# Patient Record
Sex: MALE | Race: ASIAN | NOT HISPANIC OR LATINO | Employment: OTHER
[De-identification: names, ages, dates, MRNs, and addresses within clinical notes are randomized per-mention and may not be internally consistent; named-entity substitution may affect disease eponyms.]

---

## 2017-04-22 ENCOUNTER — HOSPITAL (OUTPATIENT)
Dept: OTHER | Age: 67
End: 2017-04-22
Attending: FAMILY MEDICINE

## 2017-04-22 LAB
A/G RATIO_: 1.5
ABS LYMPH: 2.4 K/CUMM (ref 1–3.5)
ABS MONO: 0.5 K/CUMM (ref 0.1–0.8)
ABS NEUTRO: 3.6 K/CUMM (ref 2–8)
ALBUMIN: 4.1 G/DL (ref 3.5–5)
ALK PHOS: 79 UNIT/L (ref 50–124)
ALT/GPT: 23 UNIT/L (ref 0–55)
ANION GAP SERPL CALC-SCNC: 15 MEQ/L (ref 10–20)
AST/GOT: 23 UNIT/L (ref 5–34)
BASOPHIL: 1 % (ref 0–1)
BILI TOTAL: 0.6 MG/DL (ref 0.2–1)
BUN SERPL-MCNC: 13 MG/DL (ref 6–20)
CALCIUM: 9.7 MG/DL (ref 8.4–10.2)
CHLORIDE: 104 MEQ/L (ref 97–107)
CHOLESTEROL: 141 MG/DL
CREATININE: 0.93 MG/DL (ref 0.6–1.3)
DIFF_TYPE?: ABNORMAL
EOSINOPHIL: 4 % (ref 0–6)
GLOBULIN_: 2.8 G/DL (ref 2–4.1)
GLUCOSE LVL: 142 MG/DL (ref 70–99)
HCT VFR BLD CALC: 40 % (ref 36–51)
HDLC SERPL-MCNC: 40 MG/DL (ref 40–60)
HEMOLYSIS 2+: NEGATIVE
HEMOLYSIS 4+: NEGATIVE
HGB A1C: 6.8 %
HGB BLD-MCNC: 12.5 G/DL (ref 12–17)
IMMATURE GRAN: 0.3 % (ref 0–0.3)
INSTR WBC: 6.8 K/CUMM (ref 4–11)
LDLC SERPL CALC-MCNC: 90 MG/DL
LIPEMIC 3+: NEGATIVE
LYMPHOCYTE: 35 %
MCH RBC QN AUTO: 28 PG (ref 25–35)
MCHC RBC AUTO-ENTMCNC: 31 G/DL (ref 32–37)
MCV RBC AUTO: 89 FL (ref 78–97)
MICROALB/CREAT RATIO: <8 UG/MG
MONOCYTE: 7 %
NEUTROPHIL: 53 %
NRBC BLD MANUAL-RTO: 0 % (ref 0–0.2)
PLATELET: 302 K/CUMM (ref 150–450)
POTASSIUM: 4.5 MEQ/L (ref 3.5–5.1)
PSA LEVEL: 0.54 NG/ML (ref 0–4)
RBC # BLD: 4.49 M/CUMM (ref 4.2–6)
RDW: 12.9 % (ref 11.5–14.5)
SODIUM: 139 MEQ/L (ref 136–145)
TCO2: 25 MEQ/L (ref 19–29)
TOTAL PROTEIN: 6.9 G/DL (ref 6.4–8.3)
TRIGL SERPL-MCNC: 57 MG/DL
U CREATININE: 61.37 MG/DL
U MICROALBUMIN: <5 MG/L
WBC # BLD: 6.8 K/CUMM (ref 4–11)

## 2017-09-26 ENCOUNTER — HOSPITAL (OUTPATIENT)
Dept: OTHER | Age: 67
End: 2017-09-26
Attending: FAMILY MEDICINE

## 2017-09-26 LAB
HGB A1C: 7 %
MICROALB/CREAT RATIO: NORMAL UG/MG
U CREATININE: 74.36 MG/DL
U MICROALBUMIN: <5 MG/L

## 2017-12-08 ENCOUNTER — CHARTING TRANS (OUTPATIENT)
Dept: OTHER | Age: 67
End: 2017-12-08

## 2017-12-11 ENCOUNTER — HOSPITAL (OUTPATIENT)
Dept: OTHER | Age: 67
End: 2017-12-11
Attending: FAMILY MEDICINE

## 2017-12-11 LAB
A/G RATIO_: 1.2
ABS LYMPH: 2.7 K/CUMM (ref 1–3.5)
ABS MONO: 0.5 K/CUMM (ref 0.1–0.8)
ABS NEUTRO: 3.2 K/CUMM (ref 2–8)
ALBUMIN: 3.9 G/DL (ref 3.5–5)
ALK PHOS: 94 UNIT/L (ref 50–124)
ALT/GPT: 27 UNIT/L (ref 0–55)
ANION GAP SERPL CALC-SCNC: 11 MEQ/L (ref 10–20)
AST/GOT: 24 UNIT/L (ref 5–34)
BASOPHIL: 1 % (ref 0–1)
BILI TOTAL: 0.4 MG/DL (ref 0.2–1)
BUN SERPL-MCNC: 8 MG/DL (ref 6–20)
CALCIUM: 9.4 MG/DL (ref 8.4–10.2)
CHLORIDE: 104 MEQ/L (ref 97–107)
CHOLESTEROL: 136 MG/DL
CREATININE: 0.95 MG/DL (ref 0.6–1.3)
DIFF_TYPE?: NORMAL
EOSINOPHIL: 4 % (ref 0–6)
GLOBULIN_: 3.2 G/DL (ref 2–4.1)
GLUCOSE LVL: 140 MG/DL (ref 70–99)
HCT VFR BLD CALC: 40 % (ref 36–51)
HDLC SERPL-MCNC: 45 MG/DL (ref 40–60)
HEMOLYSIS 2+: NEGATIVE
HGB A1C: 7.4 %
HGB BLD-MCNC: 12.5 G/DL (ref 12–17)
IMMATURE GRAN: 0.4 % (ref 0–0.3)
INSTR WBC: 6.8 K/CUMM (ref 4–11)
LDLC SERPL CALC-MCNC: 75 MG/DL
LIPEMIC 3+: NEGATIVE
LYMPHOCYTE: 40 %
MCH RBC QN AUTO: 28 PG (ref 25–35)
MCHC RBC AUTO-ENTMCNC: 32 G/DL (ref 32–37)
MCV RBC AUTO: 90 FL (ref 78–97)
MICROALB/CREAT RATIO: NORMAL UG/MG
MONOCYTE: 8 %
NEUTROPHIL: 47 %
NRBC BLD MANUAL-RTO: 0 % (ref 0–0.2)
PLATELET: 293 K/CUMM (ref 150–450)
POTASSIUM: 4.6 MEQ/L (ref 3.5–5.1)
RBC # BLD: 4.4 M/CUMM (ref 4.2–6)
RDW: 12.3 % (ref 11.5–14.5)
SODIUM: 139 MEQ/L (ref 136–145)
TCO2: 29 MEQ/L (ref 19–29)
TOTAL PROTEIN: 7.1 G/DL (ref 6.4–8.3)
TRIGL SERPL-MCNC: 80 MG/DL
U CREATININE: 68.61 MG/DL
U MICROALBUMIN: <5 MG/L
WBC # BLD: 6.8 K/CUMM (ref 4–11)

## 2018-03-19 ENCOUNTER — OFFICE VISIT (OUTPATIENT)
Dept: FAMILY MEDICINE CLINIC | Facility: CLINIC | Age: 68
End: 2018-03-19

## 2018-03-19 VITALS
HEART RATE: 92 BPM | DIASTOLIC BLOOD PRESSURE: 78 MMHG | TEMPERATURE: 98 F | BODY MASS INDEX: 23.43 KG/M2 | SYSTOLIC BLOOD PRESSURE: 132 MMHG | WEIGHT: 137.25 LBS | HEIGHT: 64 IN

## 2018-03-19 DIAGNOSIS — E78.00 HYPERCHOLESTEREMIA: ICD-10-CM

## 2018-03-19 DIAGNOSIS — IMO0001 UNCONTROLLED TYPE 2 DIABETES MELLITUS WITHOUT COMPLICATION, WITH LONG-TERM CURRENT USE OF INSULIN: Primary | ICD-10-CM

## 2018-03-19 DIAGNOSIS — R03.0 ELEVATED BLOOD PRESSURE READING: ICD-10-CM

## 2018-03-19 PROCEDURE — 99212 OFFICE O/P EST SF 10 MIN: CPT | Performed by: FAMILY MEDICINE

## 2018-03-19 PROCEDURE — 99203 OFFICE O/P NEW LOW 30 MIN: CPT | Performed by: FAMILY MEDICINE

## 2018-03-19 RX ORDER — NAPROXEN 500 MG/1
500 TABLET ORAL 2 TIMES DAILY WITH MEALS
COMMUNITY
End: 2019-05-21 | Stop reason: ALTCHOICE

## 2018-03-19 RX ORDER — LISINOPRIL 5 MG/1
5 TABLET ORAL DAILY
Qty: 30 TABLET | Refills: 0 | Status: SHIPPED | OUTPATIENT
Start: 2018-03-19 | End: 2018-04-17

## 2018-03-19 RX ORDER — IBUPROFEN 600 MG/1
600 TABLET ORAL EVERY 6 HOURS PRN
COMMUNITY
End: 2018-04-19

## 2018-03-19 RX ORDER — ATORVASTATIN CALCIUM 20 MG/1
20 TABLET, FILM COATED ORAL NIGHTLY
COMMUNITY
End: 2018-04-19

## 2018-03-19 NOTE — PROGRESS NOTES
HPI:    Patient ID: Annette Correa is a 74year old male. HPI     Patient presents with:  Establish Care: Here today to establish care with new PCP;  Last PCP was Dr. Aneesh Welch at [facility].     Patient has hx Diabetes mellitus and cholesterol  Sanjana well-nourished. Neck: Normal range of motion. Neck supple. No thyromegaly present. Cardiovascular: Normal rate and regular rhythm. Pulmonary/Chest: Effort normal and breath sounds normal. He has no wheezes. Abdominal: Soft.  Bowel sounds are normal

## 2018-04-02 PROBLEM — E11.9 DIABETES MELLITUS TYPE 2 WITHOUT RETINOPATHY (HCC): Status: ACTIVE | Noted: 2018-04-02

## 2018-04-02 PROBLEM — H25.13 NUCLEAR SCLEROTIC CATARACT OF BOTH EYES: Status: ACTIVE | Noted: 2018-04-02

## 2018-04-03 ENCOUNTER — APPOINTMENT (OUTPATIENT)
Dept: LAB | Age: 68
End: 2018-04-03
Attending: FAMILY MEDICINE
Payer: MEDICAID

## 2018-04-03 DIAGNOSIS — IMO0001 UNCONTROLLED TYPE 2 DIABETES MELLITUS WITHOUT COMPLICATION, WITH LONG-TERM CURRENT USE OF INSULIN: ICD-10-CM

## 2018-04-03 DIAGNOSIS — E78.00 HYPERCHOLESTEREMIA: ICD-10-CM

## 2018-04-03 PROCEDURE — 82570 ASSAY OF URINE CREATININE: CPT

## 2018-04-03 PROCEDURE — 80053 COMPREHEN METABOLIC PANEL: CPT

## 2018-04-03 PROCEDURE — 36415 COLL VENOUS BLD VENIPUNCTURE: CPT

## 2018-04-03 PROCEDURE — 82043 UR ALBUMIN QUANTITATIVE: CPT

## 2018-04-03 PROCEDURE — 83036 HEMOGLOBIN GLYCOSYLATED A1C: CPT

## 2018-04-03 PROCEDURE — 80061 LIPID PANEL: CPT

## 2018-04-06 ENCOUNTER — TELEPHONE (OUTPATIENT)
Dept: OTHER | Age: 68
End: 2018-04-06

## 2018-04-06 NOTE — TELEPHONE ENCOUNTER
Reviewed lab results with pt along with Dr Jo Ann Taveras recommendations. Pt verbalized understanding and agreed with md plan.

## 2018-04-06 NOTE — TELEPHONE ENCOUNTER
----- Message from Telma Whitney MD sent at 4/5/2018 10:42 PM CDT -----  Glimepiride 1 mg daily  Sorry that was dictation error.   Will send in      Notes recorded by Lino Wilson RN on 4/5/2018 at 2:41 PM CDT  AMA, before pt is called, please jose alberto

## 2018-04-12 ENCOUNTER — OFFICE VISIT (OUTPATIENT)
Dept: PODIATRY CLINIC | Facility: CLINIC | Age: 68
End: 2018-04-12

## 2018-04-12 DIAGNOSIS — E11.9 TYPE 2 DIABETES MELLITUS WITHOUT COMPLICATION, WITHOUT LONG-TERM CURRENT USE OF INSULIN (HCC): Primary | ICD-10-CM

## 2018-04-12 PROCEDURE — 99203 OFFICE O/P NEW LOW 30 MIN: CPT | Performed by: PODIATRIST

## 2018-04-12 PROCEDURE — 99212 OFFICE O/P EST SF 10 MIN: CPT | Performed by: PODIATRIST

## 2018-04-12 NOTE — PROGRESS NOTES
HPI:    Patient ID: Avery Wolf is a 74year old male. HPI  This 73-year-old diabetic presents to me as a new patient on referral from . Patient states that he is here for a diabetic foot evaluation.   Patient states that he has been a diabetic fo him of the importance of daily inspection, proper hygiene, and the daily use of a lotion.   Plan follow-up for care and reevaluation in 1 year but he was encouraged to call immediately with any foot related concerns         ASSESSMENT/PLAN:   Type 2 diabete

## 2018-04-17 RX ORDER — LISINOPRIL 5 MG/1
TABLET ORAL
Qty: 30 TABLET | Refills: 2 | Status: SHIPPED | OUTPATIENT
Start: 2018-04-17 | End: 2018-05-06

## 2018-04-17 NOTE — TELEPHONE ENCOUNTER
Hypertensive Medications  Protocol Criteria:  · Appointment scheduled in the past 6 months or in the next 3 months  · BMP or CMP in the past 12 months  · Creatinine result < 2  Recent Outpatient Visits            5 days ago Type 2 diabetes mellitus without

## 2018-04-19 ENCOUNTER — OFFICE VISIT (OUTPATIENT)
Dept: FAMILY MEDICINE CLINIC | Facility: CLINIC | Age: 68
End: 2018-04-19

## 2018-04-19 ENCOUNTER — HOSPITAL ENCOUNTER (OUTPATIENT)
Dept: GENERAL RADIOLOGY | Age: 68
Discharge: HOME | End: 2018-04-19
Attending: FAMILY MEDICINE
Payer: MEDICAID

## 2018-04-19 VITALS
HEIGHT: 64 IN | BODY MASS INDEX: 23.9 KG/M2 | TEMPERATURE: 98 F | SYSTOLIC BLOOD PRESSURE: 130 MMHG | DIASTOLIC BLOOD PRESSURE: 76 MMHG | HEART RATE: 98 BPM | WEIGHT: 140 LBS

## 2018-04-19 DIAGNOSIS — I10 ESSENTIAL HYPERTENSION: Primary | ICD-10-CM

## 2018-04-19 DIAGNOSIS — Z77.22 EXPOSURE TO CIGARETTE SMOKE: ICD-10-CM

## 2018-04-19 DIAGNOSIS — R05.9 COUGH: ICD-10-CM

## 2018-04-19 PROBLEM — J84.10 INTERSTITIAL PULMONARY FIBROSIS (HCC): Status: ACTIVE | Noted: 2018-04-19

## 2018-04-19 PROCEDURE — 99212 OFFICE O/P EST SF 10 MIN: CPT | Performed by: FAMILY MEDICINE

## 2018-04-19 PROCEDURE — 71046 X-RAY EXAM CHEST 2 VIEWS: CPT | Performed by: FAMILY MEDICINE

## 2018-04-19 PROCEDURE — 99214 OFFICE O/P EST MOD 30 MIN: CPT | Performed by: FAMILY MEDICINE

## 2018-04-19 RX ORDER — ATORVASTATIN CALCIUM 20 MG/1
20 TABLET, FILM COATED ORAL NIGHTLY
Qty: 90 TABLET | Refills: 3 | Status: SHIPPED | OUTPATIENT
Start: 2018-04-19 | End: 2019-05-23

## 2018-04-19 RX ORDER — FLUTICASONE PROPIONATE 50 MCG
2 SPRAY, SUSPENSION (ML) NASAL NIGHTLY
Qty: 1 BOTTLE | Refills: 0 | Status: SHIPPED | OUTPATIENT
Start: 2018-04-19 | End: 2018-08-27

## 2018-04-19 RX ORDER — IBUPROFEN 600 MG/1
600 TABLET ORAL EVERY 8 HOURS PRN
Qty: 30 TABLET | Refills: 0 | Status: SHIPPED | OUTPATIENT
Start: 2018-04-19 | End: 2018-08-03

## 2018-04-19 NOTE — PROGRESS NOTES
Ernesto Santillan is a 74year old male. HPI:   Patient presents for recheck of his hypertension.  Pt has been taking medications as instructed, no medication side effects,     Cough x 2 weeks  Father was a chain smoker in [facility] and he was exposed  To it  Non GENERAL HEALTH: feels well otherwise  SKIN: denies any unusual skin lesions or rashes  RESPIRATORY: denies shortness of breath with exertion  CARDIOVASCULAR: denies chest pain on exertion  GI: denies abdominal pain and denies heartburn  NEURO: denies hea

## 2018-05-09 RX ORDER — GLIMEPIRIDE 1 MG/1
TABLET ORAL
Qty: 90 TABLET | Refills: 0 | Status: SHIPPED | OUTPATIENT
Start: 2018-05-09 | End: 2018-06-21

## 2018-05-09 RX ORDER — LISINOPRIL 5 MG/1
TABLET ORAL
Qty: 90 TABLET | Refills: 2 | Status: SHIPPED | OUTPATIENT
Start: 2018-05-09 | End: 2018-05-29 | Stop reason: ALTCHOICE

## 2018-05-09 NOTE — TELEPHONE ENCOUNTER
Pt called in to f/u on his rx request.  Pt states that he is running out of medication and will be leaving town this evening.

## 2018-05-09 NOTE — TELEPHONE ENCOUNTER
Please review and advise regarding pended refill request as unable to refill per protocol.       Diabetes Medications  Protocol Criteria:  · Appointment scheduled in the past 6 months or the next 3 months  · A1C < 7.5 in the past 6 months  · Creatinine in t without retinopathy ([facility] [address]    Carmina Reeder MD    Office Visit    1 month ago Uncontrolled type 2 diabetes mellitus without complication, with long-term current use of insulin [facility])    [facility], [facility], [address], [address]

## 2018-05-29 ENCOUNTER — OFFICE VISIT (OUTPATIENT)
Dept: PULMONOLOGY | Facility: CLINIC | Age: 68
End: 2018-05-29

## 2018-05-29 ENCOUNTER — TELEPHONE (OUTPATIENT)
Dept: FAMILY MEDICINE CLINIC | Facility: CLINIC | Age: 68
End: 2018-05-29

## 2018-05-29 VITALS
DIASTOLIC BLOOD PRESSURE: 71 MMHG | HEART RATE: 94 BPM | SYSTOLIC BLOOD PRESSURE: 125 MMHG | BODY MASS INDEX: 23.67 KG/M2 | WEIGHT: 138.63 LBS | RESPIRATION RATE: 18 BRPM | OXYGEN SATURATION: 97 % | HEIGHT: 64 IN

## 2018-05-29 DIAGNOSIS — R05.9 COUGH: Primary | ICD-10-CM

## 2018-05-29 DIAGNOSIS — J84.10 INTERSTITIAL PULMONARY FIBROSIS (HCC): ICD-10-CM

## 2018-05-29 PROCEDURE — 99244 OFF/OP CNSLTJ NEW/EST MOD 40: CPT | Performed by: INTERNAL MEDICINE

## 2018-05-29 RX ORDER — LOSARTAN POTASSIUM 25 MG/1
25 TABLET ORAL DAILY
Qty: 30 TABLET | Refills: 0 | Status: SHIPPED | OUTPATIENT
Start: 2018-05-29 | End: 2018-05-29

## 2018-05-29 NOTE — TELEPHONE ENCOUNTER
Pt was called and inform of Dr. Karlie Triplett message below and pt verbalized understanding. Thanks

## 2018-05-29 NOTE — H&P
Referring Physician  Jillian Sanz. Saqib Rosales MD    Chief Complaint  Cough    History of Present Illness  Ramona Wilhelm is a 42-year-old [facility] ([facility] male who presents today with chief complaint cough over the course last 2-3 months.   Patient admits to primarily nonproductive cou 5 MG Oral Tab TAKE 1 TABLET(5 MG) BY MOUTH DAILY Disp: 90 tablet Rfl: 2   GLIMEPIRIDE 1 MG Oral Tab TAKE 1 TABLET(1 MG) BY MOUTH DAILY WITH BREAKFAST Disp: 90 tablet Rfl: 0   Fluticasone Propionate 50 MCG/ACT Nasal Suspension 2 sprays by Each Nare route ni evidence of some interstitial changes seen throughout. He does have some basilar crackles on examination.   I recommend pulmonary function testing and CT high resolution chest to evaluate for potential interstitial lung disease.  -Patient also admits to so

## 2018-05-29 NOTE — TELEPHONE ENCOUNTER
Patient has a cough and may be related to the lisinopril. Please call to inform patient that I would like to switch this medication to losartan 25 mg once daily. Please have him stop the lisinopril and to follow-up in 2 weeks for blood pressure.

## 2018-05-29 NOTE — PATIENT INSTRUCTIONS
Please contact Dr. Prudy Peabody office in regards to taking alternative medication instead of lisinopril which may be contributing to your cough.   I will send my note to Dr. Prudy Peabody

## 2018-05-31 RX ORDER — LOSARTAN POTASSIUM 25 MG/1
TABLET ORAL
Qty: 90 TABLET | Refills: 0 | Status: SHIPPED | OUTPATIENT
Start: 2018-05-31 | End: 2018-08-16 | Stop reason: ALTCHOICE

## 2018-05-31 NOTE — TELEPHONE ENCOUNTER
Hypertensive Medications  Protocol Criteria:  · Appointment scheduled in the past 6 months or in the next 3 months  · BMP or CMP in the past 12 months  · Creatinine result < 2  Recent Outpatient Visits            2 days ago Cough    [facility]

## 2018-06-05 ENCOUNTER — HOSPITAL ENCOUNTER (OUTPATIENT)
Dept: RESPIRATORY THERAPY | Facility: HOSPITAL | Age: 68
Discharge: HOME | End: 2018-06-05
Attending: INTERNAL MEDICINE
Payer: MEDICAID

## 2018-06-05 DIAGNOSIS — R05.9 COUGH: ICD-10-CM

## 2018-06-05 NOTE — RESPIRATORY THERAPY NOTE
Pt was unable to the PFT testing.  Pt was struggling with the breathing while performing the test. Per pt, felt like he was not able to breath during the test. Patient requested to stop the test.

## 2018-06-18 NOTE — H&P
[address] [facility] [address] Gastroenterology                                                                                                  Clinic History and Physical     Pa NSAIDs/(+) daily ASA    History, Medications, Allergies, ROS:      Past Medical History:   Diagnosis Date   • Diabetes [facility])       Past Surgical History:  2017: COLONOSCOPY      Comment: normal per pt at [address]  Hx:   Family History   Problem Rela SKIN:  negative for jaundice   ALLERGIC/IMMUNOLOGIC:  negative for hay fever  ENDOCRINE:  negative for cold intolerance and heat intolerance  MUSCULOSKELETAL:  negative for joint effusion/severe erythema  BEHAVIOR/PSYCH:  negative for psychotic behavior cardiac component. Message sent to PCP regarding this. Patient advised to proceed to the ED if this worsens. #Cough: possibly 2/2 interstitial lung disease, + GERD.      #DM: Management per PCP for upper endoscopy     Recommend:  -DEBBY for c-scope from OS

## 2018-06-19 ENCOUNTER — TELEPHONE (OUTPATIENT)
Dept: GASTROENTEROLOGY | Facility: CLINIC | Age: 68
End: 2018-06-19

## 2018-06-19 ENCOUNTER — OFFICE VISIT (OUTPATIENT)
Dept: GASTROENTEROLOGY | Facility: CLINIC | Age: 68
End: 2018-06-19

## 2018-06-19 VITALS
HEART RATE: 93 BPM | SYSTOLIC BLOOD PRESSURE: 134 MMHG | DIASTOLIC BLOOD PRESSURE: 79 MMHG | WEIGHT: 137.81 LBS | HEIGHT: 63.5 IN | BODY MASS INDEX: 24.12 KG/M2

## 2018-06-19 DIAGNOSIS — R10.13 DYSPEPSIA: Primary | ICD-10-CM

## 2018-06-19 DIAGNOSIS — R07.89 CHEST PAIN, ATYPICAL: ICD-10-CM

## 2018-06-19 DIAGNOSIS — R07.89 ATYPICAL CHEST PAIN: ICD-10-CM

## 2018-06-19 DIAGNOSIS — R10.13 DYSPEPSIA: ICD-10-CM

## 2018-06-19 DIAGNOSIS — R07.89 ATYPICAL CHEST PAIN: Primary | ICD-10-CM

## 2018-06-19 DIAGNOSIS — R12 HEARTBURN: ICD-10-CM

## 2018-06-19 DIAGNOSIS — R12 HEARTBURN: Primary | ICD-10-CM

## 2018-06-19 PROCEDURE — 99244 OFF/OP CNSLTJ NEW/EST MOD 40: CPT | Performed by: PHYSICIAN ASSISTANT

## 2018-06-19 PROCEDURE — 99212 OFFICE O/P EST SF 10 MIN: CPT | Performed by: PHYSICIAN ASSISTANT

## 2018-06-19 RX ORDER — OMEPRAZOLE 40 MG/1
40 CAPSULE, DELAYED RELEASE ORAL DAILY
Qty: 90 CAPSULE | Refills: 0 | Status: SHIPPED | OUTPATIENT
Start: 2018-06-19 | End: 2018-09-13

## 2018-06-19 NOTE — TELEPHONE ENCOUNTER
Patient contacted, he has appt with Dr Blanco Starch 6/21 and will complete the EKG tomorrow after his CT scan at [address] [place] [address]

## 2018-06-19 NOTE — TELEPHONE ENCOUNTER
Scheduled for:  Israel Avina  Provider Name:    Date:  8/1/18  Location:  [facility]  Sedation:  Mac  Time:  11:00 / Arrival 10:00  Prep:Npo after Midnight  Meds/Allergies Reconciled?:  Physician reviewed  Diagnosis with codes: Dyspepsia R10.13,Heartburn R12,A

## 2018-06-19 NOTE — TELEPHONE ENCOUNTER
DEBBY from [facility] / Colonoscopy Operative report was received ,but no Pathology was taken and placed them on Pooja Varela's desk for review.

## 2018-06-19 NOTE — TELEPHONE ENCOUNTER
Please reach out to Dr. Sharlet Boxer regarding management of this patient's diabetes for upper endoscopy with MAC. Additionally, I will route this message to her as the patient is having atypical chest pain and no EKG is on file. Dr. Sharlet Boxer, shall I order one?

## 2018-06-19 NOTE — TELEPHONE ENCOUNTER
Dr. Dionna Valle-    Please advise on PB messages below, thank you. Pt will be undergoing CLN with Dr. Jaqueline Chen on 8/1/18.

## 2018-06-19 NOTE — PATIENT INSTRUCTIONS
1. Schedule an upper endoscopy with MAC sedation with Dr. Mark Madsen. 2. I have sent a message to Dr. Ryan Early re: managing your diabetes medications for the day of the procedure.  Additionally, I will have her discuss with you about your chest pain -

## 2018-06-20 ENCOUNTER — APPOINTMENT (OUTPATIENT)
Dept: LAB | Facility: HOSPITAL | Age: 68
End: 2018-06-20
Attending: INTERNAL MEDICINE
Payer: MEDICAID

## 2018-06-20 ENCOUNTER — HOSPITAL ENCOUNTER (OUTPATIENT)
Dept: CT IMAGING | Facility: HOSPITAL | Age: 68
Discharge: HOME | End: 2018-06-20
Attending: INTERNAL MEDICINE
Payer: MEDICAID

## 2018-06-20 DIAGNOSIS — J84.10 INTERSTITIAL PULMONARY FIBROSIS (HCC): ICD-10-CM

## 2018-06-20 DIAGNOSIS — R07.89 ATYPICAL CHEST PAIN: ICD-10-CM

## 2018-06-20 DIAGNOSIS — R05.9 COUGH: ICD-10-CM

## 2018-06-20 DIAGNOSIS — Z12.5 PROSTATE CANCER SCREENING: ICD-10-CM

## 2018-06-20 DIAGNOSIS — IMO0001 UNCONTROLLED TYPE 2 DIABETES MELLITUS WITHOUT COMPLICATION, WITH LONG-TERM CURRENT USE OF INSULIN: ICD-10-CM

## 2018-06-20 PROCEDURE — 36415 COLL VENOUS BLD VENIPUNCTURE: CPT

## 2018-06-20 PROCEDURE — 83036 HEMOGLOBIN GLYCOSYLATED A1C: CPT

## 2018-06-20 PROCEDURE — 80053 COMPREHEN METABOLIC PANEL: CPT

## 2018-06-20 PROCEDURE — 71250 CT THORAX DX C-: CPT | Performed by: INTERNAL MEDICINE

## 2018-06-20 PROCEDURE — 93010 ELECTROCARDIOGRAM REPORT: CPT | Performed by: FAMILY MEDICINE

## 2018-06-20 PROCEDURE — 93005 ELECTROCARDIOGRAM TRACING: CPT

## 2018-06-21 ENCOUNTER — OFFICE VISIT (OUTPATIENT)
Dept: FAMILY MEDICINE CLINIC | Facility: CLINIC | Age: 68
End: 2018-06-21

## 2018-06-21 VITALS
HEART RATE: 94 BPM | HEIGHT: 63.5 IN | BODY MASS INDEX: 23.62 KG/M2 | DIASTOLIC BLOOD PRESSURE: 71 MMHG | TEMPERATURE: 98 F | WEIGHT: 135 LBS | SYSTOLIC BLOOD PRESSURE: 116 MMHG

## 2018-06-21 DIAGNOSIS — E11.9 CONTROLLED TYPE 2 DIABETES MELLITUS WITHOUT COMPLICATION, WITHOUT LONG-TERM CURRENT USE OF INSULIN (HCC): Primary | ICD-10-CM

## 2018-06-21 DIAGNOSIS — I10 ESSENTIAL HYPERTENSION: ICD-10-CM

## 2018-06-21 DIAGNOSIS — R07.9 LEFT SIDED CHEST PAIN: ICD-10-CM

## 2018-06-21 DIAGNOSIS — B37.42 CANDIDAL BALANITIS: ICD-10-CM

## 2018-06-21 DIAGNOSIS — R68.81 EARLY SATIETY: ICD-10-CM

## 2018-06-21 DIAGNOSIS — I25.10 CORONARY ARTERY CALCIFICATION SEEN ON CAT SCAN: ICD-10-CM

## 2018-06-21 DIAGNOSIS — J84.10 PULMONARY FIBROSIS (HCC): ICD-10-CM

## 2018-06-21 PROCEDURE — 99212 OFFICE O/P EST SF 10 MIN: CPT | Performed by: FAMILY MEDICINE

## 2018-06-21 PROCEDURE — 99214 OFFICE O/P EST MOD 30 MIN: CPT | Performed by: FAMILY MEDICINE

## 2018-06-21 RX ORDER — NYSTATIN 100000 U/G
1 CREAM TOPICAL 3 TIMES DAILY
Qty: 1 TUBE | Refills: 2 | Status: SHIPPED | OUTPATIENT
Start: 2018-06-21 | End: 2019-06-24 | Stop reason: ALTCHOICE

## 2018-06-21 RX ORDER — GLIMEPIRIDE 1 MG/1
1 TABLET ORAL
Qty: 90 TABLET | Refills: 3 | Status: SHIPPED | OUTPATIENT
Start: 2018-06-21 | End: 2018-10-26

## 2018-06-21 NOTE — PROGRESS NOTES
HPI:    Patient ID: Amina Thomas is a 74year old male. HPI    Patient here for follow-up on his lab test results. Overall he states he is doing well. His diabetes is much improved.   He does complain of having symptoms of possible heartburn as he states with breakfast. Disp: 90 tablet Rfl: 3   MetFORMIN HCl 1000 MG Oral Tab Take 1 tablet (1,000 mg total) by mouth 2 (two) times daily with meals.  Disp: 180 tablet Rfl: 3   nystatin 248476 UNIT/GM External Cream Apply 1 Application topically 3 (three) times d insulin (hcc)  (primary encounter diagnosis)  Early satiety  Coronary artery calcification seen on cat scan  Essential hypertension  Left sided chest pain  Pulmonary fibrosis (hcc)  Candidal balanitis    1.  Controlled type 2 diabetes mellitus without compl

## 2018-06-21 NOTE — PATIENT INSTRUCTIONS
Balanitis    Balanitis is an inflammation of the head of the penis. It can happen because of a buildup of germs (bacteria, viruses, or fungi) under the foreskin. It can also happen because of exposure to soaps and other chemicals.  In adults, this is most © 2536-8115 [employer] [address] [facility] [address]. All rights reserved. This information is not intended as a substitute for professional medical care. Always follow your healthcare professional's instructions.

## 2018-06-23 PROBLEM — I45.10 RIGHT BUNDLE BRANCH BLOCK: Status: ACTIVE | Noted: 2018-06-23

## 2018-06-26 NOTE — TELEPHONE ENCOUNTER
The reports that I have show that there was no date of service at [facility]. There is no colonoscopy or any other report with the records that I received.

## 2018-07-05 NOTE — TELEPHONE ENCOUNTER
Dr. Issa Hernandez-    Please advise on DM med adjustment orders prior to pt's EGD with Dr. Selam Hargrove on 8/1/18, he will be NPO at midnight, thank you.

## 2018-07-09 NOTE — TELEPHONE ENCOUNTER
Pt contacted and reviewed Dr. Dannie Greene DM med message below, pt verbalized understanding.     Pt states that he is trying to sort out some insurance issues, but requested to keep his current date, if unable to sort \"problems\" with his insurance, he will c/b t

## 2018-08-04 RX ORDER — IBUPROFEN 600 MG/1
TABLET ORAL
Qty: 30 TABLET | Refills: 0 | Status: SHIPPED | OUTPATIENT
Start: 2018-08-04 | End: 2019-05-21 | Stop reason: ALTCHOICE

## 2018-08-04 NOTE — TELEPHONE ENCOUNTER
Please review; unclear if meant to be chronic med as only 4/19/18 Rx only had quantity of 30.     Refill Protocol Appointment Criteria  · Appointment scheduled in the past 6 months or in the next 3 months  Recent Outpatient Visits            1 month ago Con

## 2018-08-16 ENCOUNTER — TELEPHONE (OUTPATIENT)
Dept: FAMILY MEDICINE CLINIC | Facility: CLINIC | Age: 68
End: 2018-08-16

## 2018-08-16 ENCOUNTER — OFFICE VISIT (OUTPATIENT)
Dept: FAMILY MEDICINE CLINIC | Facility: CLINIC | Age: 68
End: 2018-08-16
Payer: COMMERCIAL

## 2018-08-16 VITALS
HEART RATE: 96 BPM | SYSTOLIC BLOOD PRESSURE: 160 MMHG | HEIGHT: 63.5 IN | WEIGHT: 139 LBS | TEMPERATURE: 98 F | BODY MASS INDEX: 24.32 KG/M2 | DIASTOLIC BLOOD PRESSURE: 75 MMHG

## 2018-08-16 DIAGNOSIS — J84.10 PULMONARY FIBROSIS (HCC): Primary | ICD-10-CM

## 2018-08-16 DIAGNOSIS — I25.10 CORONARY ARTERY CALCIFICATION SEEN ON CAT SCAN: ICD-10-CM

## 2018-08-16 DIAGNOSIS — M79.632 LEFT FOREARM PAIN: ICD-10-CM

## 2018-08-16 PROCEDURE — 99212 OFFICE O/P EST SF 10 MIN: CPT | Performed by: FAMILY MEDICINE

## 2018-08-16 PROCEDURE — 99214 OFFICE O/P EST MOD 30 MIN: CPT | Performed by: FAMILY MEDICINE

## 2018-08-16 RX ORDER — LISINOPRIL 5 MG/1
5 TABLET ORAL DAILY
Qty: 90 TABLET | Refills: 1 | Status: SHIPPED | OUTPATIENT
Start: 2018-08-16 | End: 2018-11-13

## 2018-08-16 RX ORDER — LISINOPRIL 5 MG/1
TABLET ORAL
Refills: 2 | COMMUNITY
Start: 2018-08-03 | End: 2018-08-16

## 2018-08-16 NOTE — TELEPHONE ENCOUNTER
Pt called in requesting to speak with AA or her assistant in regards to the CT Scan that was ordered. Pt stated that his insurance will not cover the CT scan. Pt was seen today.   Please advise

## 2018-08-16 NOTE — PROGRESS NOTES
HPI:    Patient ID: Lashon Booker is a 74year old male. HPI     Patient here with complains of left forearm pain. Patient states had the pain for the last 2 weeks. He works as a . He does frequently lift luggage.   He states the pain in Disp: 90 capsule Rfl: 0   atorvastatin 20 MG Oral Tab Take 1 tablet (20 mg total) by mouth nightly. Disp: 90 tablet Rfl: 3   aspirin 81 MG Oral Tab Take 81 mg by mouth daily.  Disp:  Rfl:    IBUPROFEN 600 MG Oral Tab TAKE 1 TABLET BY MOUTH EVERY 8 HOURS AS

## 2018-08-17 NOTE — TELEPHONE ENCOUNTER
Pt called and RN informed him of reason calcium scoring test was ordered.   Pt verbalized understanding

## 2018-08-28 RX ORDER — FLUTICASONE PROPIONATE 50 MCG
SPRAY, SUSPENSION (ML) NASAL
Qty: 3 BOTTLE | Refills: 0 | Status: SHIPPED | OUTPATIENT
Start: 2018-08-28 | End: 2019-04-04

## 2018-08-28 NOTE — TELEPHONE ENCOUNTER
Refill Protocol Appointment Criteria  · Appointment scheduled in the past 12 months or in the next 3 months  Recent Outpatient Visits            1 week ago Pulmonary fibrosis [facility])    Jessica Reyez MD    Office Visit

## 2018-09-13 RX ORDER — OMEPRAZOLE 40 MG/1
CAPSULE, DELAYED RELEASE ORAL
Qty: 30 CAPSULE | Refills: 0 | Status: SHIPPED | OUTPATIENT
Start: 2018-09-13 | End: 2018-11-04

## 2018-09-18 RX ORDER — OMEPRAZOLE 40 MG/1
CAPSULE, DELAYED RELEASE ORAL
Qty: 90 CAPSULE | Refills: 0 | OUTPATIENT
Start: 2018-09-18

## 2018-09-18 NOTE — TELEPHONE ENCOUNTER
Omeprazole 40 mg addressed and filled on 9/13/18 by Lashay Pabon PA-C. No further action required at this time.

## 2018-09-20 ENCOUNTER — CHARTING TRANS (OUTPATIENT)
Dept: OTHER | Age: 68
End: 2018-09-20

## 2018-10-09 ENCOUNTER — OFFICE VISIT (OUTPATIENT)
Dept: FAMILY MEDICINE CLINIC | Facility: CLINIC | Age: 68
End: 2018-10-09
Payer: COMMERCIAL

## 2018-10-09 VITALS
BODY MASS INDEX: 24.15 KG/M2 | TEMPERATURE: 98 F | HEART RATE: 92 BPM | SYSTOLIC BLOOD PRESSURE: 134 MMHG | DIASTOLIC BLOOD PRESSURE: 79 MMHG | HEIGHT: 63.5 IN | WEIGHT: 138 LBS

## 2018-10-09 DIAGNOSIS — R05.9 COUGH: Primary | ICD-10-CM

## 2018-10-09 DIAGNOSIS — R12 HEARTBURN: ICD-10-CM

## 2018-10-09 DIAGNOSIS — J84.10 PULMONARY FIBROSIS (HCC): ICD-10-CM

## 2018-10-09 DIAGNOSIS — R13.10 SWALLOWING DYSFUNCTION: ICD-10-CM

## 2018-10-09 DIAGNOSIS — Z23 NEED FOR VACCINATION WITH 13-POLYVALENT PNEUMOCOCCAL CONJUGATE VACCINE: ICD-10-CM

## 2018-10-09 DIAGNOSIS — E11.9 CONTROLLED TYPE 2 DIABETES MELLITUS WITHOUT COMPLICATION, WITHOUT LONG-TERM CURRENT USE OF INSULIN (HCC): ICD-10-CM

## 2018-10-09 PROCEDURE — 90670 PCV13 VACCINE IM: CPT | Performed by: FAMILY MEDICINE

## 2018-10-09 PROCEDURE — 90471 IMMUNIZATION ADMIN: CPT | Performed by: FAMILY MEDICINE

## 2018-10-09 PROCEDURE — 99212 OFFICE O/P EST SF 10 MIN: CPT | Performed by: FAMILY MEDICINE

## 2018-10-09 PROCEDURE — 99214 OFFICE O/P EST MOD 30 MIN: CPT | Performed by: FAMILY MEDICINE

## 2018-10-09 NOTE — PROGRESS NOTES
HPI:    Patient ID: Kellen Ann is a 74year old male.     HPI     Patient presents with:  Heartburn: X 2 weeks   Gas  Cough: dry       Patient c/o heart burn, and gas   Has seen GI  rx was sent for omeprazole 9/13 but patient never got  He eats to eating ho total) by mouth 2 (two) times daily with meals. Disp: 180 tablet Rfl: 3   nystatin [id number] UNIT/GM External Cream Apply 1 Application topically 3 (three) times daily.  Disp: 1 Tube Rfl: 2   atorvastatin 20 MG Oral Tab Take 1 tablet (20 mg total) by mouth nig Controlled type 2 diabetes mellitus without complication, without long-term current use of insulin (HCC)    - HEMOGLOBIN A1C; Future  - COMP METABOLIC PANEL (14); Future    5. Pulmonary fibrosis (HCC)    - PULMONARY - INTERNAL    6.  Need for vaccination wi

## 2018-10-20 ENCOUNTER — APPOINTMENT (OUTPATIENT)
Dept: LAB | Facility: HOSPITAL | Age: 68
End: 2018-10-20
Attending: FAMILY MEDICINE
Payer: COMMERCIAL

## 2018-10-20 ENCOUNTER — APPOINTMENT (OUTPATIENT)
Dept: LAB | Facility: HOSPITAL | Age: 68
End: 2018-10-20
Payer: COMMERCIAL

## 2018-10-20 DIAGNOSIS — E11.9 CONTROLLED TYPE 2 DIABETES MELLITUS WITHOUT COMPLICATION, WITHOUT LONG-TERM CURRENT USE OF INSULIN (HCC): ICD-10-CM

## 2018-10-20 PROCEDURE — 36415 COLL VENOUS BLD VENIPUNCTURE: CPT

## 2018-10-20 PROCEDURE — 80053 COMPREHEN METABOLIC PANEL: CPT

## 2018-10-20 PROCEDURE — 83036 HEMOGLOBIN GLYCOSYLATED A1C: CPT

## 2018-10-22 ENCOUNTER — HOSPITAL ENCOUNTER (OUTPATIENT)
Dept: GENERAL RADIOLOGY | Facility: HOSPITAL | Age: 68
Discharge: HOME | End: 2018-10-22
Attending: FAMILY MEDICINE
Payer: COMMERCIAL

## 2018-10-22 DIAGNOSIS — R12 HEARTBURN: ICD-10-CM

## 2018-10-22 DIAGNOSIS — R13.10 SWALLOWING DYSFUNCTION: ICD-10-CM

## 2018-10-22 PROCEDURE — 74246 X-RAY XM UPR GI TRC 2CNTRST: CPT | Performed by: FAMILY MEDICINE

## 2018-10-23 ENCOUNTER — OFFICE VISIT (OUTPATIENT)
Dept: PULMONOLOGY | Facility: CLINIC | Age: 68
End: 2018-10-23
Payer: COMMERCIAL

## 2018-10-23 VITALS
WEIGHT: 144.38 LBS | HEIGHT: 64 IN | BODY MASS INDEX: 24.65 KG/M2 | RESPIRATION RATE: 18 BRPM | DIASTOLIC BLOOD PRESSURE: 74 MMHG | HEART RATE: 101 BPM | OXYGEN SATURATION: 99 % | SYSTOLIC BLOOD PRESSURE: 132 MMHG

## 2018-10-23 DIAGNOSIS — J84.9 ILD (INTERSTITIAL LUNG DISEASE) (HCC): Primary | ICD-10-CM

## 2018-10-23 PROCEDURE — 99212 OFFICE O/P EST SF 10 MIN: CPT | Performed by: INTERNAL MEDICINE

## 2018-10-23 PROCEDURE — 99213 OFFICE O/P EST LOW 20 MIN: CPT | Performed by: INTERNAL MEDICINE

## 2018-10-23 NOTE — PATIENT INSTRUCTIONS
Be aware that you have condition called pulmonary fibrosis. I would recommend taking omeprazole which is over-the-counter total of 40 mg once daily in the morning.

## 2018-10-23 NOTE — PROGRESS NOTES
Referring Physician  Irish Records. MD Suzie    History of Present Illness  Patient seen today for follow-up visit at pulmonary clinic. He admits to some on going dyspnea with exertion. He admits to no significant improvement in nonproductive cough.   He admits deficits  Skin: warm, dry  Lymphatic: no supraclavicular lymphadenopathy     Assessment  1. ILD  2. Dyspnea with exertion  3. Pulmonary nodules  4. Chronic cough  5. GERD    Plan  -Patient seen today for follow-up visit.   Patient admits to no signific

## 2018-10-26 DIAGNOSIS — N28.89 RENAL CALCIFICATION: ICD-10-CM

## 2018-10-26 DIAGNOSIS — R13.10 DYSPHAGIA, UNSPECIFIED TYPE: Primary | ICD-10-CM

## 2018-10-26 DIAGNOSIS — K22.2 SCHATZKI'S RING: ICD-10-CM

## 2018-10-31 ENCOUNTER — TELEPHONE (OUTPATIENT)
Dept: PULMONOLOGY | Facility: CLINIC | Age: 68
End: 2018-10-31

## 2018-10-31 NOTE — TELEPHONE ENCOUNTER
Edgar requesting to speak with [facility] - states he is returning call. PLs call. Thank you.

## 2018-11-01 NOTE — TELEPHONE ENCOUNTER
Discussed with the brother patient's overall condition which she had given consent to discuss earlier during his previous visit. Encouraged brother to have patient follow-up and have recommended testing performed given new diagnosis of ILD.

## 2018-11-04 ENCOUNTER — TELEPHONE (OUTPATIENT)
Dept: GASTROENTEROLOGY | Facility: CLINIC | Age: 68
End: 2018-11-04

## 2018-11-05 RX ORDER — OMEPRAZOLE 40 MG/1
CAPSULE, DELAYED RELEASE ORAL
Qty: 30 CAPSULE | Refills: 0 | Status: SHIPPED | OUTPATIENT
Start: 2018-11-05 | End: 2019-06-24 | Stop reason: ALTCHOICE

## 2018-11-05 NOTE — TELEPHONE ENCOUNTER
Requested Prescriptions     Pending Prescriptions Disp Refills   • OMEPRAZOLE 40 MG Oral Capsule Delayed Release [Pharmacy Med Name: OMEPRAZOLE 40MG CAPSULES] 30 capsule 0     Sig: TAKE 1 CAPSULE(40 MG) BY MOUTH DAILY BEFORE BREAKFAST     LOV 6/19/18, pt d

## 2018-11-06 NOTE — TELEPHONE ENCOUNTER
Pt contacted and reviewed PB message below, he states he will be f/u with Dr. Anita Link for future refills and declined appt with PB at this time. Letter sent to his home address as well.

## 2018-11-07 ENCOUNTER — HOSPITAL ENCOUNTER (OUTPATIENT)
Dept: ULTRASOUND IMAGING | Age: 68
Discharge: HOME | End: 2018-11-07
Attending: FAMILY MEDICINE
Payer: COMMERCIAL

## 2018-11-07 DIAGNOSIS — N28.89 RENAL CALCIFICATION: ICD-10-CM

## 2018-11-07 PROCEDURE — 76700 US EXAM ABDOM COMPLETE: CPT | Performed by: FAMILY MEDICINE

## 2018-11-13 ENCOUNTER — OFFICE VISIT (OUTPATIENT)
Dept: FAMILY MEDICINE CLINIC | Facility: CLINIC | Age: 68
End: 2018-11-13
Payer: COMMERCIAL

## 2018-11-13 VITALS
TEMPERATURE: 98 F | BODY MASS INDEX: 24.59 KG/M2 | DIASTOLIC BLOOD PRESSURE: 75 MMHG | HEIGHT: 64 IN | SYSTOLIC BLOOD PRESSURE: 122 MMHG | WEIGHT: 144 LBS | HEART RATE: 94 BPM | OXYGEN SATURATION: 97 %

## 2018-11-13 DIAGNOSIS — R05.9 COUGH: Primary | ICD-10-CM

## 2018-11-13 PROCEDURE — 99212 OFFICE O/P EST SF 10 MIN: CPT | Performed by: FAMILY MEDICINE

## 2018-11-13 PROCEDURE — 99214 OFFICE O/P EST MOD 30 MIN: CPT | Performed by: FAMILY MEDICINE

## 2018-11-13 NOTE — PROGRESS NOTES
HPI:    Patient ID: Krishna Garza is a 79year old male.     HPI     Patient here for follow up  Still having cough    Stopped lisinopril 2-3 weeks no change in cough    Has seen Pulm and GI  On omeprazole since few weeks, but no  change in cough  Changed diet SHAKE LIQUID AND USE 2 SPRAYS IN EACH NOSTRIL QNIGHTLY Disp: 3 Bottle Rfl: 0   IBUPROFEN 600 MG Oral Tab TAKE 1 TABLET BY MOUTH EVERY 8 HOURS AS NEEDED FOR PAIN Disp: 30 tablet Rfl: 0   naproxen 500 MG Oral Tab Take 500 mg by mouth 2 (two) times daily with

## 2018-11-26 ENCOUNTER — HOSPITAL ENCOUNTER (OUTPATIENT)
Dept: CT IMAGING | Facility: HOSPITAL | Age: 68
Discharge: HOME | End: 2018-11-26
Attending: INTERNAL MEDICINE
Payer: COMMERCIAL

## 2018-11-26 DIAGNOSIS — J84.9 ILD (INTERSTITIAL LUNG DISEASE) (HCC): ICD-10-CM

## 2018-12-05 RX ORDER — OMEPRAZOLE 40 MG/1
CAPSULE, DELAYED RELEASE ORAL
Qty: 30 CAPSULE | Refills: 0 | OUTPATIENT
Start: 2018-12-05

## 2018-12-05 NOTE — TELEPHONE ENCOUNTER
Requested Prescriptions     Pending Prescriptions Disp Refills   • OMEPRAZOLE 40 MG Oral Capsule Delayed Release [Pharmacy Med Name: OMEPRAZOLE 40MG CAPSULES] 30 capsule 0     Sig: TAKE 1 CAPSULE(40 MG) BY MOUTH DAILY BEFORE BREAKFAST     LOV-8/1/18 pt did

## 2018-12-05 NOTE — TELEPHONE ENCOUNTER
See 11/4 TE - patient declined to follow up in [facility] + states he will receive refills from his PCP. I am more than happy to evaluate this patient in office.

## 2019-03-28 ENCOUNTER — TELEPHONE (OUTPATIENT)
Dept: FAMILY MEDICINE CLINIC | Facility: CLINIC | Age: 69
End: 2019-03-28

## 2019-03-28 NOTE — TELEPHONE ENCOUNTER
Pt states woke up with slight pain and swelling on L side of face. States difficult to keep mouth open and at times difficult to swallow. Informed pt he should probably be seen in the ER, but pt refused stating he wanted an OV.  Informed Dr. Saqib Rosales is on wallace

## 2019-03-29 ENCOUNTER — HOSPITAL ENCOUNTER (EMERGENCY)
Facility: HOSPITAL | Age: 69
Discharge: HOME | End: 2019-03-29
Attending: EMERGENCY MEDICINE
Payer: MEDICAID

## 2019-03-29 ENCOUNTER — OFFICE VISIT (OUTPATIENT)
Dept: INTERNAL MEDICINE CLINIC | Facility: CLINIC | Age: 69
End: 2019-03-29
Payer: MEDICAID

## 2019-03-29 VITALS
TEMPERATURE: 101 F | DIASTOLIC BLOOD PRESSURE: 65 MMHG | RESPIRATION RATE: 24 BRPM | HEART RATE: 102 BPM | SYSTOLIC BLOOD PRESSURE: 116 MMHG | OXYGEN SATURATION: 95 %

## 2019-03-29 VITALS
DIASTOLIC BLOOD PRESSURE: 73 MMHG | WEIGHT: 131 LBS | HEIGHT: 63 IN | BODY MASS INDEX: 23.21 KG/M2 | SYSTOLIC BLOOD PRESSURE: 121 MMHG | HEART RATE: 112 BPM | TEMPERATURE: 100 F

## 2019-03-29 DIAGNOSIS — R50.9 FEVER AND CHILLS: ICD-10-CM

## 2019-03-29 DIAGNOSIS — B02.9 HERPES ZOSTER WITHOUT COMPLICATION: Primary | ICD-10-CM

## 2019-03-29 DIAGNOSIS — A46 ERYSIPELAS: ICD-10-CM

## 2019-03-29 DIAGNOSIS — L03.211 FACIAL CELLULITIS: Primary | ICD-10-CM

## 2019-03-29 DIAGNOSIS — R13.19 OTHER DYSPHAGIA: ICD-10-CM

## 2019-03-29 PROCEDURE — 99212 OFFICE O/P EST SF 10 MIN: CPT | Performed by: INTERNAL MEDICINE

## 2019-03-29 PROCEDURE — 99213 OFFICE O/P EST LOW 20 MIN: CPT | Performed by: INTERNAL MEDICINE

## 2019-03-29 PROCEDURE — 99283 EMERGENCY DEPT VISIT LOW MDM: CPT

## 2019-03-29 RX ORDER — IBUPROFEN 600 MG/1
600 TABLET ORAL ONCE
Status: COMPLETED | OUTPATIENT
Start: 2019-03-29 | End: 2019-03-29

## 2019-03-29 RX ORDER — VALACYCLOVIR HYDROCHLORIDE 1 G/1
1 TABLET, FILM COATED ORAL 3 TIMES DAILY
Qty: 21 TABLET | Refills: 0 | Status: SHIPPED | OUTPATIENT
Start: 2019-03-29 | End: 2019-04-04 | Stop reason: ALTCHOICE

## 2019-03-29 RX ORDER — GABAPENTIN 300 MG/1
CAPSULE ORAL
Qty: 87 CAPSULE | Refills: 0 | Status: SHIPPED | OUTPATIENT
Start: 2019-03-29 | End: 2019-04-28

## 2019-03-29 RX ORDER — METHYLPREDNISOLONE 4 MG/1
TABLET ORAL
Qty: 1 PACKAGE | Refills: 0 | Status: SHIPPED | OUTPATIENT
Start: 2019-03-29 | End: 2019-04-03

## 2019-03-29 RX ORDER — HYDROCODONE BITARTRATE AND ACETAMINOPHEN 5; 325 MG/1; MG/1
1 TABLET ORAL EVERY 4 HOURS PRN
Qty: 10 TABLET | Refills: 0 | Status: SHIPPED | OUTPATIENT
Start: 2019-03-29 | End: 2019-04-04 | Stop reason: ALTCHOICE

## 2019-03-29 NOTE — PROGRESS NOTES
HPI:    Patient ID: Arina Randhawa is a 79year old male.   Chief Complaint: Swelling (Pt started developing left side face swelling x 2 days took ibuprofen one night then woke up with the swelling, pt went to [place] for 2 months returned on March 14)    Per tri Temp: 99.8 °F (37.7 °C)   TempSrc: Oral   Weight: 131 lb (59.4 kg)   Height: 5' 3\" (1.6 m)     Body mass index is 23.21 kg/m².       Past Medical History:     Past Medical History:   Diagnosis Date   • Diabetes [facility])      Patient Active Problem List:     C Constitutional: He appears well-developed. No distress (no significant distress but mild discomfort). HENT:   Head: Atraumatic.        Left face/maxillary: multiple areas of induration, warmth, tenderness, erythema, noncontiugous, a few scattered small bu Medications This Visit:  Requested Prescriptions      No prescriptions requested or ordered in this encounter       Imaging & Referrals:  None    Orders This Visit:   No orders of the defined types were placed in this encounter.         Tawanda Morris MD  In

## 2019-03-29 NOTE — ED NOTES
Vesicular rash to L sided face x 2 days, associated with pain redness and swelling. RXs reviewed with pt, to follow-up with Dr Nazario Naqvi as discussed or to return to ER for any new or worsening symptoms.

## 2019-03-29 NOTE — PATIENT INSTRUCTIONS
Please go to [facility] ER due to your facial swelling, fever, difficulty swallowing, to be evaluated.

## 2019-03-29 NOTE — ED PROVIDER NOTES
Patient Seen in: [facility] Emergency Department    History   Patient presents with:  Rash Skin Problem (integumentary)    Stated Complaint: painful rash to left face    HPI    62-year-old male presents for complaint of a rash to the left side of Head: Normocephalic and atraumatic. Right Ear: Tympanic membrane normal.   Left Ear: Tympanic membrane normal. There is tenderness.    Ears:    Mouth/Throat: Uvula is midline and oropharynx is clear and moist.   Eyes: Conjunctivae, EOM and lids are no For follow up and re-evaluation    We recommend that you schedule follow up care with a primary care provider within the next three months to obtain basic health screening including reassessment of your blood pressure.     Medications Prescribed:  Current D

## 2019-04-04 ENCOUNTER — OFFICE VISIT (OUTPATIENT)
Dept: FAMILY MEDICINE CLINIC | Facility: CLINIC | Age: 69
End: 2019-04-04
Payer: MEDICARE

## 2019-04-04 VITALS
TEMPERATURE: 98 F | BODY MASS INDEX: 22.86 KG/M2 | DIASTOLIC BLOOD PRESSURE: 82 MMHG | HEART RATE: 97 BPM | HEIGHT: 63 IN | SYSTOLIC BLOOD PRESSURE: 151 MMHG | WEIGHT: 129 LBS

## 2019-04-04 DIAGNOSIS — B02.9 HERPES ZOSTER WITHOUT COMPLICATION: Primary | ICD-10-CM

## 2019-04-04 PROCEDURE — G0463 HOSPITAL OUTPT CLINIC VISIT: HCPCS | Performed by: FAMILY MEDICINE

## 2019-04-04 PROCEDURE — 99214 OFFICE O/P EST MOD 30 MIN: CPT | Performed by: FAMILY MEDICINE

## 2019-04-04 RX ORDER — HYDROCODONE BITARTRATE AND ACETAMINOPHEN 5; 325 MG/1; MG/1
1 TABLET ORAL EVERY 4 HOURS PRN
Qty: 10 TABLET | Refills: 0 | Status: SHIPPED | OUTPATIENT
Start: 2019-04-04 | End: 2019-04-11

## 2019-04-04 NOTE — PATIENT INSTRUCTIONS
??????  ??????? ? ?? ????? ?????? ??? ????? ??? ?? ?? ???? ???????? ???? ??? ??. ????? ??????? ?? ?? ???? ????? ????? ?? ?? ??????? ????? ??????? ????? ??? ??. ????? ??????? ??? ??, ????? ???? ?? (?????????). ??????? ??????? ????? ???????? ???? ??? ??????? ?????? ?????? ???. ?????? ???????? ???? ??? ??? ??.  ??? ???? ????????? ??? ??? ????? ???? ??? ???????? ????? ??????? ??? ??? ??? ????? ??? ???? ???? ???.  ???????? ?????  ????? ??????????? ??????? ???? ????????? ????? ??????.  ????? ???? ?????? ????  100.

## 2019-04-04 NOTE — PROGRESS NOTES
HPI:    Patient ID: Yasemin Rogel is a 79year old male. HPI  Patient presents with:  ER F/U: rash on face and left ear still having pain     Patient seen initially in the office on March 29 with Dr. Tanja Bosworth. Was felt to have cellulitis on the face.   Was re times daily with meals. Disp: 180 tablet Rfl: 3   atorvastatin 20 MG Oral Tab Take 1 tablet (20 mg total) by mouth nightly. Disp: 90 tablet Rfl: 3   aspirin 81 MG Oral Tab Take 81 mg by mouth daily.  Disp:  Rfl:    naproxen 500 MG Oral Tab Take 500 mg by mo

## 2019-04-11 ENCOUNTER — NURSE TRIAGE (OUTPATIENT)
Dept: OTHER | Age: 69
End: 2019-04-11

## 2019-04-11 NOTE — TELEPHONE ENCOUNTER
Action Requested: Summary for Provider     []  Critical Lab, Recommendations Needed  [] Need Additional Advice  []   FYI    []   Need Orders  [] Need Medications Sent to Pharmacy  []  Other     SUMMARY: patient was seen 4/4/19 by Dr. Levi Lock for Left side fac

## 2019-04-15 ENCOUNTER — OFFICE VISIT (OUTPATIENT)
Dept: FAMILY MEDICINE CLINIC | Facility: CLINIC | Age: 69
End: 2019-04-15
Payer: MEDICARE

## 2019-04-15 VITALS
TEMPERATURE: 98 F | HEART RATE: 112 BPM | HEIGHT: 63 IN | OXYGEN SATURATION: 96 % | SYSTOLIC BLOOD PRESSURE: 130 MMHG | BODY MASS INDEX: 22.86 KG/M2 | WEIGHT: 129 LBS | DIASTOLIC BLOOD PRESSURE: 81 MMHG

## 2019-04-15 DIAGNOSIS — K08.9 POOR DENTITION: ICD-10-CM

## 2019-04-15 DIAGNOSIS — B02.8 HERPES ZOSTER WITH OTHER COMPLICATION: ICD-10-CM

## 2019-04-15 DIAGNOSIS — H92.02 LEFT EAR PAIN: ICD-10-CM

## 2019-04-15 DIAGNOSIS — K05.10 GINGIVITIS: Primary | ICD-10-CM

## 2019-04-15 PROCEDURE — 99214 OFFICE O/P EST MOD 30 MIN: CPT | Performed by: FAMILY MEDICINE

## 2019-04-15 PROCEDURE — G0463 HOSPITAL OUTPT CLINIC VISIT: HCPCS | Performed by: FAMILY MEDICINE

## 2019-04-15 RX ORDER — AMOXICILLIN AND CLAVULANATE POTASSIUM 875; 125 MG/1; MG/1
1 TABLET, FILM COATED ORAL 2 TIMES DAILY
Qty: 20 TABLET | Refills: 0 | Status: SHIPPED | OUTPATIENT
Start: 2019-04-15 | End: 2019-04-25

## 2019-04-15 NOTE — PROGRESS NOTES
HPI:    Patient ID: Amina Thomas is a 79year old male. HPI  Patient presents with: Follow - Up: on shingles still having pain on left side of face     Patient had herpes zoster end of March and seen the emergency room on March 29.   His rash has improved taper when stopping. . Disp: 87 capsule Rfl: 0   OMEPRAZOLE 40 MG Oral Capsule Delayed Release TAKE 1 CAPSULE(40 MG) BY MOUTH DAILY BEFORE BREAKFAST Disp: 30 capsule Rfl: 0   IBUPROFEN 600 MG Oral Tab TAKE 1 TABLET BY MOUTH EVERY 8 HOURS AS NEEDED FOR PAIN Signed Prescriptions Disp Refills   • Amoxicillin-Pot Clavulanate 875-125 MG Oral Tab 20 tablet 0     Sig: Take 1 tablet by mouth 2 (two) times daily for 10 days.    • maalox/diphenhydramine/sucralfate Oral Suspension 1 Bottle 0     Sig: Take 10 mL by m

## 2019-04-19 ENCOUNTER — OFFICE VISIT (OUTPATIENT)
Dept: OTOLARYNGOLOGY | Facility: CLINIC | Age: 69
End: 2019-04-19
Payer: MEDICARE

## 2019-04-19 DIAGNOSIS — B02.9 HERPES ZOSTER WITHOUT COMPLICATION: Primary | ICD-10-CM

## 2019-04-19 PROCEDURE — 99203 OFFICE O/P NEW LOW 30 MIN: CPT | Performed by: OTOLARYNGOLOGY

## 2019-04-19 PROCEDURE — G0463 HOSPITAL OUTPT CLINIC VISIT: HCPCS | Performed by: OTOLARYNGOLOGY

## 2019-04-19 RX ORDER — FAMCICLOVIR 250 MG/1
250 TABLET, FILM COATED ORAL 2 TIMES DAILY
Qty: 20 TABLET | Refills: 0 | Status: SHIPPED | OUTPATIENT
Start: 2019-04-19 | End: 2019-04-29

## 2019-04-19 NOTE — PROGRESS NOTES
Edwin Echeverria is a 79year old male.  Patient presents with:  Ear Problem Pain (neurosensory): pt recent dx with shingles , let ear pain   Mouth/Lip Problem: per pt having some numbness    HPI:   About 5 weeks ago he noticed a sudden onset of pain and blisters Diabetes [facility])       Social History:  Social History    Tobacco Use      Smoking status: Never Smoker      Smokeless tobacco: Never Used    Alcohol use: No    Drug use: No       REVIEW OF SYSTEMS:   GENERAL HEALTH: feels well otherwise  GENERAL : denies fe some time. He is to return for any problems. The patient indicates understanding of these issues and agrees to the plan. Bart Estevez MD  4/19/2019  7:45 AM

## 2019-04-30 ENCOUNTER — TELEPHONE (OUTPATIENT)
Dept: OTOLARYNGOLOGY | Facility: CLINIC | Age: 69
End: 2019-04-30

## 2019-04-30 NOTE — TELEPHONE ENCOUNTER
Pt states he is still having ear pain and headaches. Pt is almost out of Famciclovir 250 MG medication. Pt asking if he needs refill and what  would advise.

## 2019-04-30 NOTE — TELEPHONE ENCOUNTER
Informed patient of note below and per  to see a neurologist Teressa Yang #[phone]. Patient verbalized understanding.

## 2019-04-30 NOTE — TELEPHONE ENCOUNTER
Per Dr Jet Ramirez no refill on medication. Pt is to continue working with the neurologist as he is going to continue to have pain.  - LMTCB

## 2019-05-04 RX ORDER — FAMCICLOVIR 250 MG/1
TABLET, FILM COATED ORAL
Qty: 20 TABLET | Refills: 0 | OUTPATIENT
Start: 2019-05-04

## 2019-05-06 ENCOUNTER — TELEPHONE (OUTPATIENT)
Dept: OTOLARYNGOLOGY | Facility: CLINIC | Age: 69
End: 2019-05-06

## 2019-05-06 ENCOUNTER — TELEPHONE (OUTPATIENT)
Dept: FAMILY MEDICINE CLINIC | Facility: CLINIC | Age: 69
End: 2019-05-06

## 2019-05-06 DIAGNOSIS — B02.29 POST HERPETIC NEURALGIA: Primary | ICD-10-CM

## 2019-05-06 RX ORDER — GABAPENTIN 100 MG/1
100 CAPSULE ORAL 2 TIMES DAILY
Qty: 60 CAPSULE | Refills: 0 | Status: SHIPPED | OUTPATIENT
Start: 2019-05-06 | End: 2019-06-05

## 2019-05-06 NOTE — TELEPHONE ENCOUNTER
pt would like for you to give him a refill for Famciclovir 250 mg. Pt is aware it was Prescribe by  but they gave not refilled it for him. Pt stated that this medication helps with the pain.  Pt stated that he continues to have pain and he c

## 2019-05-06 NOTE — TELEPHONE ENCOUNTER
Dr Jimy Monterroso patient stated he finished his antiviral medication Famciclovir but still has pain to left side of face cannot sleep and has difficulty eating. denies any fever,pt is not taking gabapentin he does not have that medicine,please advise.

## 2019-05-06 NOTE — TELEPHONE ENCOUNTER
Verified pt name and . Reviewed doctor's recommendations as noted below. Pt agreed with plan of care and had no further questions at this time. Per pt request referral information sent via [facility].

## 2019-05-06 NOTE — TELEPHONE ENCOUNTER
Pt states he is having pain on the L side of his face and wants to know what  recommends.  Pt requesting to speak to RN, ramon call thank you

## 2019-05-06 NOTE — TELEPHONE ENCOUNTER
Pt called in states that he currently experiencing pain from shingles and would like to discuss this with Dr. Humberto Morrow. He declines scheduling an appt with doctor. Please advise.

## 2019-05-06 NOTE — TELEPHONE ENCOUNTER
He actually told me that he had taken gabapentin and had only started for a day or 2 when I seen him in the office.   If that is not the case he can start taking it at 100 mg twice daily

## 2019-05-13 ENCOUNTER — TELEPHONE (OUTPATIENT)
Dept: FAMILY MEDICINE CLINIC | Facility: CLINIC | Age: 69
End: 2019-05-13

## 2019-05-13 RX ORDER — LIDOCAINE 50 MG/G
1 PATCH TOPICAL EVERY 24 HOURS
Qty: 10 PATCH | Refills: 0 | Status: SHIPPED | OUTPATIENT
Start: 2019-05-13 | End: 2019-05-21 | Stop reason: ALTCHOICE

## 2019-05-13 NOTE — TELEPHONE ENCOUNTER
Patient called again and stated, \"I went to pain drDenita but they said they can help with back pain, head pain but not with shingles pain. \" Please ask Dr. Jordyn Carmona for lidoderm cream.  Please advise

## 2019-05-13 NOTE — TELEPHONE ENCOUNTER
Dr. Levi Lock: Patient contacted. Recommendations given to patient. Patient states he called Pain management already and was told that pain mgmt does not treat his condition. Patient seems frustrated and looking for solution.   His pain 7/10 on left side of

## 2019-05-13 NOTE — TELEPHONE ENCOUNTER
Call transferred by CSS: Pt informed of AMA's response below but pt states has already seen the dermatologist and was not given anything for pain. States is still having pain on left side of face and is unable to sleep because of it.  States his friend's da

## 2019-05-14 NOTE — TELEPHONE ENCOUNTER
Advised patient of Dr. Edgardo Kirk note. Patient verbalized understanding and had no further questions.

## 2019-05-21 ENCOUNTER — OFFICE VISIT (OUTPATIENT)
Dept: FAMILY MEDICINE CLINIC | Facility: CLINIC | Age: 69
End: 2019-05-21
Payer: MEDICARE

## 2019-05-21 ENCOUNTER — APPOINTMENT (OUTPATIENT)
Dept: LAB | Age: 69
End: 2019-05-21
Attending: FAMILY MEDICINE
Payer: MEDICARE

## 2019-05-21 VITALS
SYSTOLIC BLOOD PRESSURE: 138 MMHG | WEIGHT: 126 LBS | BODY MASS INDEX: 22.32 KG/M2 | TEMPERATURE: 99 F | HEART RATE: 96 BPM | DIASTOLIC BLOOD PRESSURE: 80 MMHG | HEIGHT: 63 IN

## 2019-05-21 DIAGNOSIS — R07.89 CHEST DISCOMFORT: ICD-10-CM

## 2019-05-21 DIAGNOSIS — R51.9 LEFT FACIAL PAIN: Primary | ICD-10-CM

## 2019-05-21 DIAGNOSIS — B02.29 POST HERPETIC NEURALGIA: ICD-10-CM

## 2019-05-21 PROCEDURE — G0463 HOSPITAL OUTPT CLINIC VISIT: HCPCS | Performed by: FAMILY MEDICINE

## 2019-05-21 PROCEDURE — 93010 ELECTROCARDIOGRAM REPORT: CPT | Performed by: FAMILY MEDICINE

## 2019-05-21 PROCEDURE — 99214 OFFICE O/P EST MOD 30 MIN: CPT | Performed by: FAMILY MEDICINE

## 2019-05-21 PROCEDURE — 93005 ELECTROCARDIOGRAM TRACING: CPT

## 2019-05-21 RX ORDER — RANITIDINE 150 MG/1
150 TABLET ORAL 2 TIMES DAILY
Qty: 60 TABLET | Refills: 0 | Status: SHIPPED | OUTPATIENT
Start: 2019-05-21 | End: 2019-06-21

## 2019-05-21 RX ORDER — SUCRALFATE 1 G/10ML
SUSPENSION ORAL
COMMUNITY
Start: 2019-04-15 | End: 2019-05-21 | Stop reason: ALTCHOICE

## 2019-05-21 RX ORDER — DULOXETIN HYDROCHLORIDE 30 MG/1
30 CAPSULE, DELAYED RELEASE ORAL DAILY
Qty: 30 CAPSULE | Refills: 2 | Status: SHIPPED | OUTPATIENT
Start: 2019-05-21 | End: 2019-06-24

## 2019-05-21 RX ORDER — RANITIDINE 150 MG/1
TABLET ORAL
Qty: 180 TABLET | Refills: 0 | OUTPATIENT
Start: 2019-05-21

## 2019-05-21 NOTE — PROGRESS NOTES
HPI:    Patient ID: Magali Abbasi is a 79year old male. HPI  Patient presents with: Follow - Up: on shingles pt states he still has some pain on his left side of his face     Developed shingles left side face about 2 months ago.  Ongoing pain left side wh Disp:  Rfl:      Allergies:No Known Allergies   PHYSICAL EXAM:   Patient presents with:   Follow - Up: on shingles pt states he still has some pain on his left side of his face      Physical Exam   Constitutional: He appears well-developed and well-nourishe raNITIdine HCl 150 MG Oral Tab 60 tablet 0     Sig: Take 1 tablet (150 mg total) by mouth 2 (two) times daily.        Imaging & Referrals:  None       #[id number]

## 2019-05-23 RX ORDER — ATORVASTATIN CALCIUM 20 MG/1
TABLET, FILM COATED ORAL
Qty: 90 TABLET | Refills: 0 | Status: SHIPPED | OUTPATIENT
Start: 2019-05-23 | End: 2019-08-21

## 2019-05-28 DIAGNOSIS — B02.29 POST HERPETIC NEURALGIA: Primary | ICD-10-CM

## 2019-05-28 NOTE — PROGRESS NOTES
Called patient and informed  Sent in diclofenac cream, avoid eye. Use sparingly.     Follow up with Dr Panfilo Pastrana    Patient verbalized understanding

## 2019-06-05 RX ORDER — GABAPENTIN 100 MG/1
CAPSULE ORAL
Qty: 60 CAPSULE | Refills: 0 | Status: SHIPPED | OUTPATIENT
Start: 2019-06-05 | End: 2019-06-24

## 2019-06-21 RX ORDER — RANITIDINE 150 MG/1
TABLET ORAL
Qty: 180 TABLET | Refills: 1 | Status: SHIPPED | OUTPATIENT
Start: 2019-06-21 | End: 2019-07-26

## 2019-06-21 NOTE — TELEPHONE ENCOUNTER
Refill passed per [facility] protocol.   Refill Protocol Appointment Criteria  · Appointment scheduled in the past 12 months or in the next 3 months  Recent Outpatient Visits            1 month ago Left facial pain    [facility], [address],

## 2019-06-22 NOTE — TELEPHONE ENCOUNTER
Please review; protocol failed.     Requested Prescriptions     Pending Prescriptions Disp Refills   • METFORMIN HCL 1000 MG Oral Tab [Pharmacy Med Name: METFORMIN 1000MG TABLETS] 180 tablet 0     Sig: TAKE 1 TABLET BY MOUTH TWICE DAILY WITH MEALS         R

## 2019-06-23 DIAGNOSIS — E78.00 HYPERCHOLESTEREMIA: ICD-10-CM

## 2019-06-23 DIAGNOSIS — E11.9 CONTROLLED TYPE 2 DIABETES MELLITUS WITHOUT COMPLICATION, WITHOUT LONG-TERM CURRENT USE OF INSULIN (HCC): Primary | ICD-10-CM

## 2019-06-24 ENCOUNTER — OFFICE VISIT (OUTPATIENT)
Dept: FAMILY MEDICINE CLINIC | Facility: CLINIC | Age: 69
End: 2019-06-24
Payer: MEDICARE

## 2019-06-24 VITALS
SYSTOLIC BLOOD PRESSURE: 125 MMHG | HEART RATE: 113 BPM | BODY MASS INDEX: 21.79 KG/M2 | HEIGHT: 63 IN | DIASTOLIC BLOOD PRESSURE: 80 MMHG | TEMPERATURE: 98 F | WEIGHT: 123 LBS

## 2019-06-24 DIAGNOSIS — B02.29 POST HERPETIC NEURALGIA: ICD-10-CM

## 2019-06-24 DIAGNOSIS — Z12.11 COLON CANCER SCREENING: ICD-10-CM

## 2019-06-24 DIAGNOSIS — E11.9 CONTROLLED TYPE 2 DIABETES MELLITUS WITHOUT COMPLICATION, WITHOUT LONG-TERM CURRENT USE OF INSULIN (HCC): Primary | ICD-10-CM

## 2019-06-24 DIAGNOSIS — K59.00 CONSTIPATION, UNSPECIFIED CONSTIPATION TYPE: ICD-10-CM

## 2019-06-24 DIAGNOSIS — R51.9 LEFT FACIAL PAIN: ICD-10-CM

## 2019-06-24 PROCEDURE — 99214 OFFICE O/P EST MOD 30 MIN: CPT | Performed by: FAMILY MEDICINE

## 2019-06-24 PROCEDURE — G0463 HOSPITAL OUTPT CLINIC VISIT: HCPCS | Performed by: FAMILY MEDICINE

## 2019-06-24 RX ORDER — POLYETHYLENE GLYCOL 3350 17 G/17G
17 POWDER, FOR SOLUTION ORAL DAILY
Qty: 1 EACH | Refills: 2 | Status: SHIPPED | OUTPATIENT
Start: 2019-06-24 | End: 2020-03-02

## 2019-06-24 RX ORDER — DULOXETIN HYDROCHLORIDE 30 MG/1
30 CAPSULE, DELAYED RELEASE ORAL DAILY
Qty: 90 CAPSULE | Refills: 3 | Status: SHIPPED | OUTPATIENT
Start: 2019-06-24 | End: 2019-07-15

## 2019-06-24 RX ORDER — GABAPENTIN 100 MG/1
100 CAPSULE ORAL 3 TIMES DAILY
Qty: 90 CAPSULE | Refills: 3 | Status: SHIPPED | OUTPATIENT
Start: 2019-06-24 | End: 2019-10-11

## 2019-06-24 NOTE — PROGRESS NOTES
HPI:    Patient ID: Anshu Payne is a 79year old male. HPI  Patient presents with:   Follow - Up: on shingles pt states he feels pain and numbness on and off mostly on left side of face and unable to hear well on left ear   Follow - Up: EKG results     Pa onto the skin 2 (two) times daily as needed. Disp: 120 g Rfl: 0   ATORVASTATIN 20 MG Oral Tab TAKE 1 TABLET BY MOUTH NIGHTLY Disp: 90 tablet Rfl: 0   aspirin 81 MG Oral Tab Take 81 mg by mouth daily.  Disp:  Rfl:      Allergies:No Known Allergies   PHYSICAL encounter. Meds This Visit:  Requested Prescriptions     Signed Prescriptions Disp Refills   • Polyethylene Glycol 3350 (MIRALAX) Oral Powd Pack 1 each 2     Sig: Take 17 g by mouth daily.    • DULoxetine HCl (CYMBALTA) 30 MG Oral Cap DR Particles 90 c

## 2019-06-24 NOTE — TELEPHONE ENCOUNTER
Please find patient to get fasting blood work done for cholesterol and diabetes.   I have placed the lab orders

## 2019-06-27 ENCOUNTER — APPOINTMENT (OUTPATIENT)
Dept: LAB | Age: 69
End: 2019-06-27
Attending: FAMILY MEDICINE
Payer: MEDICARE

## 2019-06-27 DIAGNOSIS — E78.00 HYPERCHOLESTEREMIA: ICD-10-CM

## 2019-06-27 DIAGNOSIS — E11.9 CONTROLLED TYPE 2 DIABETES MELLITUS WITHOUT COMPLICATION, WITHOUT LONG-TERM CURRENT USE OF INSULIN (HCC): ICD-10-CM

## 2019-06-27 LAB
ALBUMIN SERPL-MCNC: 4 G/DL (ref 3.4–5)
ALBUMIN/GLOB SERPL: 1.3 {RATIO} (ref 1–2)
ALP LIVER SERPL-CCNC: 73 U/L (ref 45–117)
ALT SERPL-CCNC: 19 U/L (ref 16–61)
ANION GAP SERPL CALC-SCNC: 9 MMOL/L (ref 0–18)
AST SERPL-CCNC: 19 U/L (ref 15–37)
BILIRUB SERPL-MCNC: 0.5 MG/DL (ref 0.1–2)
BUN BLD-MCNC: 6 MG/DL (ref 7–18)
BUN/CREAT SERPL: 7.1 (ref 10–20)
CALCIUM BLD-MCNC: 9.5 MG/DL (ref 8.5–10.1)
CHLORIDE SERPL-SCNC: 102 MMOL/L (ref 98–112)
CHOLEST SMN-MCNC: 226 MG/DL (ref ?–200)
CO2 SERPL-SCNC: 27 MMOL/L (ref 21–32)
CREAT BLD-MCNC: 0.85 MG/DL (ref 0.7–1.3)
CREAT UR-SCNC: 46.3 MG/DL
EST. AVERAGE GLUCOSE BLD GHB EST-MCNC: 154 MG/DL (ref 68–126)
GLOBULIN PLAS-MCNC: 3.2 G/DL (ref 2.8–4.4)
GLUCOSE BLD-MCNC: 133 MG/DL (ref 70–99)
HBA1C MFR BLD HPLC: 7 % (ref ?–5.7)
HDLC SERPL-MCNC: 51 MG/DL (ref 40–59)
LDLC SERPL CALC-MCNC: 149 MG/DL (ref ?–100)
M PROTEIN MFR SERPL ELPH: 7.2 G/DL (ref 6.4–8.2)
MICROALBUMIN UR-MCNC: 0.62 MG/DL
MICROALBUMIN/CREAT 24H UR-RTO: 13.4 UG/MG (ref ?–30)
NONHDLC SERPL-MCNC: 175 MG/DL (ref ?–130)
OSMOLALITY SERPL CALC.SUM OF ELEC: 286 MOSM/KG (ref 275–295)
PATIENT FASTING: YES
PATIENT FASTING: YES
POTASSIUM SERPL-SCNC: 4.1 MMOL/L (ref 3.5–5.1)
SODIUM SERPL-SCNC: 138 MMOL/L (ref 136–145)
TRIGL SERPL-MCNC: 132 MG/DL (ref 30–149)
VLDLC SERPL CALC-MCNC: 26 MG/DL (ref 0–30)

## 2019-06-27 PROCEDURE — 80061 LIPID PANEL: CPT

## 2019-06-27 PROCEDURE — 36415 COLL VENOUS BLD VENIPUNCTURE: CPT

## 2019-06-27 PROCEDURE — 80053 COMPREHEN METABOLIC PANEL: CPT

## 2019-06-27 PROCEDURE — 83036 HEMOGLOBIN GLYCOSYLATED A1C: CPT

## 2019-06-27 PROCEDURE — 82043 UR ALBUMIN QUANTITATIVE: CPT

## 2019-06-27 PROCEDURE — 82570 ASSAY OF URINE CREATININE: CPT

## 2019-07-03 ENCOUNTER — TELEPHONE (OUTPATIENT)
Dept: FAMILY MEDICINE CLINIC | Facility: CLINIC | Age: 69
End: 2019-07-03

## 2019-07-03 DIAGNOSIS — Z12.5 SCREENING PSA (PROSTATE SPECIFIC ANTIGEN): Primary | ICD-10-CM

## 2019-07-03 DIAGNOSIS — E78.00 HYPERCHOLESTEREMIA: ICD-10-CM

## 2019-07-03 DIAGNOSIS — E11.9 DIABETES MELLITUS TYPE 2 WITHOUT RETINOPATHY (HCC): ICD-10-CM

## 2019-07-03 NOTE — TELEPHONE ENCOUNTER
Diabetes slightly worse. Her current medication regimen at this time. Decrease intake of white rice, bread, potatoes and sweets. Recheck diabetes in 3 months.   Cholesterol slightly elevated and would recommend staying on the current medication and urban

## 2019-07-15 ENCOUNTER — OFFICE VISIT (OUTPATIENT)
Dept: NEUROLOGY | Facility: CLINIC | Age: 69
End: 2019-07-15
Payer: MEDICARE

## 2019-07-15 VITALS
BODY MASS INDEX: 22.2 KG/M2 | WEIGHT: 130 LBS | RESPIRATION RATE: 15 BRPM | SYSTOLIC BLOOD PRESSURE: 110 MMHG | HEIGHT: 64 IN | HEART RATE: 62 BPM | DIASTOLIC BLOOD PRESSURE: 58 MMHG

## 2019-07-15 DIAGNOSIS — B02.22 POST-HERPETIC TRIGEMINAL NEURALGIA: Primary | ICD-10-CM

## 2019-07-15 PROCEDURE — 99204 OFFICE O/P NEW MOD 45 MIN: CPT | Performed by: PHYSICAL MEDICINE & REHABILITATION

## 2019-07-15 NOTE — PROGRESS NOTES
Cervical Pain H & P    Chief Complaint:  Patient presents with:  Pain: new patient here with left side jaw/facial pain post shingles diagnosed 3/29/19.  takes Gabapentin 100mg ~1 in am and 2 qhs which causes complete numbness for 8h then there is intense pa Acid reflux    • Diabetes (HCC)    • Hyperlipidemia        Past Surgical History   Past Surgical History:   Procedure Laterality Date   • COLONOSCOPY  2017    normal per pt at [place]   • OTHER      surgery on buttocks in [facility] in 1986       Family History Occupational Exposure: Not Asked        Hobby Hazards: Not Asked        Sleep Concern: Not Asked        Stress Concern: Not Asked        Weight Concern: Not Asked        Special Diet: Not Asked        Back Care: Not Asked        Exercise: Yes          antonio submandibular, supraclavicular, and cervical lymph nodes. .    Vitals:   07/15/19  1134   BP: 110/58   Pulse: 62   Resp: 15       Cervical Spine:    Posture: mild chin forward superiorly rotated protracted shoulder posture.    Shoulders: Level   Head: In adan

## 2019-07-15 NOTE — PATIENT INSTRUCTIONS
As of October 6th 2014, the Drug Enforcement Agency [facility]) is reclassifying all hydrocodone combination medications from Schedule III to Schedule II. This includes medications such as Norco, Vicodin, Lortab, Zohydro, and Vicoprofen.     What this means for y will continue with the Neurontin for the pain, but he will increase the morning dose to 200 mg and will continue with the 200 mg at bedtime.   If the 200 mg in the morning makes him too tired to perform his job, then he will decrease the dose back to 100 mg

## 2019-07-16 ENCOUNTER — TELEPHONE (OUTPATIENT)
Dept: FAMILY MEDICINE CLINIC | Facility: CLINIC | Age: 69
End: 2019-07-16

## 2019-07-16 RX ORDER — GABAPENTIN 100 MG/1
100 CAPSULE ORAL 3 TIMES DAILY
Qty: 90 CAPSULE | Refills: 3 | Status: CANCELLED | OUTPATIENT
Start: 2019-07-16

## 2019-07-16 NOTE — TELEPHONE ENCOUNTER
•  gabapentin 100 MG Oral Cap, Take 1 capsule (100 mg total) by mouth 3 (three) times daily. , Disp: 90 capsule, Rfl: 3    Requesting a 90 Day -Supply

## 2019-07-16 NOTE — TELEPHONE ENCOUNTER
Refill addressed    gabapentin 100 MG Oral Cap 90 capsule 3 6/24/2019     Sig - Route:  Take 1 capsule (100 mg total) by mouth 3 (three) times daily. - Oral    Sent to pharmacy as: Gabapentin 100 MG Oral Capsule    E-Prescribing Status: Receipt confirmed by

## 2019-07-26 ENCOUNTER — OFFICE VISIT (OUTPATIENT)
Dept: GASTROENTEROLOGY | Facility: CLINIC | Age: 69
End: 2019-07-26
Payer: MEDICARE

## 2019-07-26 ENCOUNTER — TELEPHONE (OUTPATIENT)
Dept: GASTROENTEROLOGY | Facility: CLINIC | Age: 69
End: 2019-07-26

## 2019-07-26 VITALS
SYSTOLIC BLOOD PRESSURE: 139 MMHG | DIASTOLIC BLOOD PRESSURE: 79 MMHG | BODY MASS INDEX: 21.34 KG/M2 | WEIGHT: 125 LBS | HEIGHT: 64 IN | HEART RATE: 102 BPM

## 2019-07-26 DIAGNOSIS — K21.9 GASTROESOPHAGEAL REFLUX DISEASE WITHOUT ESOPHAGITIS: ICD-10-CM

## 2019-07-26 DIAGNOSIS — R07.9 CHEST PAIN, UNSPECIFIED TYPE: Primary | ICD-10-CM

## 2019-07-26 PROCEDURE — 99214 OFFICE O/P EST MOD 30 MIN: CPT | Performed by: INTERNAL MEDICINE

## 2019-07-26 PROCEDURE — G0463 HOSPITAL OUTPT CLINIC VISIT: HCPCS | Performed by: INTERNAL MEDICINE

## 2019-07-26 RX ORDER — OMEPRAZOLE 40 MG/1
40 CAPSULE, DELAYED RELEASE ORAL DAILY
Qty: 30 CAPSULE | Refills: 11 | Status: SHIPPED | OUTPATIENT
Start: 2019-07-26 | End: 2019-12-30

## 2019-07-26 NOTE — TELEPHONE ENCOUNTER
Signed DEBBY form faxed to [facility] for pt's last colonoscopy path and op report.     Fax: [phone]  Phone: [phone]

## 2019-07-26 NOTE — PATIENT INSTRUCTIONS
1. DEBBY from [facility] -- ([facility])   2. Stress test. If stress test is normal, plan for upper endoscopy test   3. Stop ranitidine  4. Take omeprazole daily 30 min before breakfast  5.  Reduce caffeine, chocolate, peppermint, alcohol

## 2019-07-29 PROBLEM — R07.9 CHEST PAIN: Status: ACTIVE | Noted: 2019-07-29

## 2019-07-29 PROBLEM — K21.9 GASTROESOPHAGEAL REFLUX DISEASE WITHOUT ESOPHAGITIS: Status: ACTIVE | Noted: 2019-07-29

## 2019-07-29 NOTE — H&P
[facility], [facility] - Gastroenterology                                                                                                               Reason for consult: s Drug use: No       Medications (Active prior to today's visit):    Current Outpatient Medications:  Omeprazole 40 MG Oral Capsule Delayed Release Take 1 capsule (40 mg total) by mouth daily.  Take 1 capsule by mouth daily before breakfast. Disp: 30 capsule noted, no masses are palpated  Skin- No jaundice  Ext: no cyanosis, clubbing or edema is evident.    Neuro- Alert and interactive, and gross movements of extremities normal    Labs/Imaging:     Patient's pertinent labs and imaging were reviewed and discusse even death. I also specifically mentioned the miss rate of upper endoscopy of 5-10% in the best of all circumstances.   The patient has agreed to sign an informed consent and elected to proceed with procedure with possible intervention [i.e. polypectomy, st

## 2019-07-30 NOTE — TELEPHONE ENCOUNTER
Received fax from [facility] stating that they do not have any records for this pt's prior colonoscopy. Contacted pt and he stated he is sure it was at [facility], however, it may have been done at [facility] on [address] in [place].  Faxed DEBBY

## 2019-08-01 ENCOUNTER — HOSPITAL ENCOUNTER (OUTPATIENT)
Dept: NUCLEAR MEDICINE | Facility: HOSPITAL | Age: 69
Discharge: HOME | End: 2019-08-01
Attending: INTERNAL MEDICINE
Payer: MEDICARE

## 2019-08-01 ENCOUNTER — HOSPITAL ENCOUNTER (OUTPATIENT)
Dept: CV DIAGNOSTICS | Facility: HOSPITAL | Age: 69
Discharge: HOME | End: 2019-08-01
Attending: INTERNAL MEDICINE
Payer: MEDICARE

## 2019-08-01 DIAGNOSIS — R07.9 CHEST PAIN, UNSPECIFIED TYPE: ICD-10-CM

## 2019-08-01 PROCEDURE — 93016 CV STRESS TEST SUPVJ ONLY: CPT | Performed by: INTERNAL MEDICINE

## 2019-08-01 PROCEDURE — 93017 CV STRESS TEST TRACING ONLY: CPT | Performed by: INTERNAL MEDICINE

## 2019-08-01 PROCEDURE — 93018 CV STRESS TEST I&R ONLY: CPT | Performed by: INTERNAL MEDICINE

## 2019-08-01 PROCEDURE — 78452 HT MUSCLE IMAGE SPECT MULT: CPT | Performed by: INTERNAL MEDICINE

## 2019-08-01 RX ORDER — 0.9 % SODIUM CHLORIDE 0.9 %
VIAL (ML) INJECTION
Status: COMPLETED
Start: 2019-08-01 | End: 2019-08-01

## 2019-08-01 RX ADMIN — 0.9 % SODIUM CHLORIDE 10 ML: 0.9 % VIAL (ML) INJECTION at 10:37:00

## 2019-08-08 ENCOUNTER — TELEPHONE (OUTPATIENT)
Dept: GASTROENTEROLOGY | Facility: CLINIC | Age: 69
End: 2019-08-08

## 2019-08-08 DIAGNOSIS — K21.9 GASTROESOPHAGEAL REFLUX DISEASE, ESOPHAGITIS PRESENCE NOT SPECIFIED: Primary | ICD-10-CM

## 2019-08-08 NOTE — TELEPHONE ENCOUNTER
Scheduled for:  EGD - 63635  Provider Name:  Dr. Segundo Dahl  Date:  9/18/19  Location:  [facility]  Sedation:  MAC  Time:  9:15 am (pt is aware to arrive at 8:15 am)  Prep:  NPO after midnight, Prep instructions were given to pt in the office, pt verbalized understand

## 2019-08-08 NOTE — TELEPHONE ENCOUNTER
D/w Juan Antonio--->Stress test is negative. Omeprazole helping with chest pain. Will proceed with EGD with IV sedation.        GI Clinical Staff:   Please call patient to schedule EGD with IV sedation   -hold metformin the day of test  -Dx: GERD

## 2019-08-08 NOTE — TELEPHONE ENCOUNTER
Dr. Sunita Arroyo--    This patient is requesting a morning appt since he has DM. Since your schedule has changed, IV sedation is starts at 2pm. Please advise if I can either change to MAC or schedule an IV first thing in the AM. Thank you.

## 2019-08-09 NOTE — TELEPHONE ENCOUNTER
COLONOSCOPY REPORT REVIEWED:    -6/2016  -Dr. Ramón Whyte  -Demerol 75mg and Versed 3mg  -Done to cecum, TI normal  -NO POLYPS or lesions.    -Grade 1-2 ext hemorrhoids, otherwise NORMAL

## 2019-08-15 ENCOUNTER — OFFICE VISIT (OUTPATIENT)
Dept: FAMILY MEDICINE CLINIC | Facility: CLINIC | Age: 69
End: 2019-08-15
Payer: MEDICARE

## 2019-08-15 VITALS
DIASTOLIC BLOOD PRESSURE: 72 MMHG | WEIGHT: 127 LBS | TEMPERATURE: 98 F | SYSTOLIC BLOOD PRESSURE: 122 MMHG | BODY MASS INDEX: 21.68 KG/M2 | HEIGHT: 64 IN | HEART RATE: 102 BPM

## 2019-08-15 DIAGNOSIS — H61.21 RIGHT EAR IMPACTED CERUMEN: ICD-10-CM

## 2019-08-15 DIAGNOSIS — R06.7 SNEEZING: ICD-10-CM

## 2019-08-15 DIAGNOSIS — R05.9 COUGH: Primary | ICD-10-CM

## 2019-08-15 PROCEDURE — G0463 HOSPITAL OUTPT CLINIC VISIT: HCPCS | Performed by: FAMILY MEDICINE

## 2019-08-15 PROCEDURE — 99214 OFFICE O/P EST MOD 30 MIN: CPT | Performed by: FAMILY MEDICINE

## 2019-08-15 RX ORDER — FLUTICASONE PROPIONATE 50 MCG
2 SPRAY, SUSPENSION (ML) NASAL DAILY
Qty: 1 BOTTLE | Refills: 0 | Status: SHIPPED | OUTPATIENT
Start: 2019-08-15 | End: 2019-08-15

## 2019-08-15 RX ORDER — LORATADINE 10 MG/1
10 TABLET ORAL NIGHTLY
Qty: 30 TABLET | Refills: 0 | Status: SHIPPED | OUTPATIENT
Start: 2019-08-15 | End: 2020-12-15

## 2019-08-15 NOTE — PROGRESS NOTES
HPI:    Patient ID: Stephanie Leiva is a 79year old male. HPI  Patient presents with:  Cough: dry cough X 10 days     Bad cough x 10 days  Has cardiac stress test 8/1/19. noticed after he got the technetium he noticed he got a cough. Cough has persisted. daily. Disp: 1 each Rfl: 2     Allergies:No Known Allergies   PHYSICAL EXAM:   Patient presents with:  Cough: dry cough X 10 days      Physical Exam   Constitutional: He appears well-developed and well-nourished.    HENT:   Left Ear: External ear normal.

## 2019-08-16 RX ORDER — FLUTICASONE PROPIONATE 50 MCG
SPRAY, SUSPENSION (ML) NASAL
Qty: 1 BOTTLE | Refills: 2 | Status: SHIPPED | OUTPATIENT
Start: 2019-08-16 | End: 2020-03-02

## 2019-08-22 RX ORDER — ATORVASTATIN CALCIUM 20 MG/1
TABLET, FILM COATED ORAL
Qty: 90 TABLET | Refills: 1 | Status: SHIPPED | OUTPATIENT
Start: 2019-08-22 | End: 2019-12-02

## 2019-08-23 NOTE — TELEPHONE ENCOUNTER
Please review; protocol failed.  D/t labs  Requested Prescriptions     Pending Prescriptions Disp Refills   • ATORVASTATIN 20 MG Oral Tab [Pharmacy Med Name: ATORVASTATIN 20MG TABLETS] 90 tablet 0     Sig: TAKE 1 TABLET BY MOUTH NIGHTLY         Recent Visit

## 2019-09-10 ENCOUNTER — PATIENT MESSAGE (OUTPATIENT)
Dept: GASTROENTEROLOGY | Facility: CLINIC | Age: 69
End: 2019-09-10

## 2019-09-10 NOTE — TELEPHONE ENCOUNTER
From: Genesis Aguilera  To: Ag Crenshaw MD  Sent: 9/10/2019 9:48 AM CDT  Subject: Visit Follow-up Question    Hi,  I talked with [facility] Customer care. They told me U have to call them for pre Approved at 18 th September 2019 Surgery.   I awite an early repl

## 2019-09-16 ENCOUNTER — TELEPHONE (OUTPATIENT)
Dept: GASTROENTEROLOGY | Facility: CLINIC | Age: 69
End: 2019-09-16

## 2019-09-16 NOTE — TELEPHONE ENCOUNTER
EGS prep instructions were already sent to patient by GI  TOAN through [employer] on 8/8/2019. Re-sent to him again today.

## 2019-09-16 NOTE — TELEPHONE ENCOUNTER
Pt has EGD scheduled 9/18/19,pt [facility] pls call [phone].

## 2019-09-18 ENCOUNTER — ANESTHESIA (OUTPATIENT)
Dept: ENDOSCOPY | Facility: HOSPITAL | Age: 69
End: 2019-09-18
Payer: MEDICARE

## 2019-09-18 ENCOUNTER — HOSPITAL ENCOUNTER (OUTPATIENT)
Facility: HOSPITAL | Age: 69
Setting detail: HOSPITAL OUTPATIENT SURGERY
Discharge: HOME | End: 2019-09-18
Attending: INTERNAL MEDICINE | Admitting: INTERNAL MEDICINE
Payer: MEDICARE

## 2019-09-18 ENCOUNTER — ANESTHESIA EVENT (OUTPATIENT)
Dept: ENDOSCOPY | Facility: HOSPITAL | Age: 69
End: 2019-09-18
Payer: MEDICARE

## 2019-09-18 VITALS
TEMPERATURE: 97 F | WEIGHT: 130 LBS | HEART RATE: 80 BPM | HEIGHT: 64 IN | BODY MASS INDEX: 22.2 KG/M2 | DIASTOLIC BLOOD PRESSURE: 65 MMHG | RESPIRATION RATE: 17 BRPM | SYSTOLIC BLOOD PRESSURE: 129 MMHG | OXYGEN SATURATION: 97 %

## 2019-09-18 DIAGNOSIS — K21.9 GASTROESOPHAGEAL REFLUX DISEASE, ESOPHAGITIS PRESENCE NOT SPECIFIED: ICD-10-CM

## 2019-09-18 LAB — GLUCOSE BLDC GLUCOMTR-MCNC: 171 MG/DL (ref 70–99)

## 2019-09-18 PROCEDURE — 43239 EGD BIOPSY SINGLE/MULTIPLE: CPT | Performed by: INTERNAL MEDICINE

## 2019-09-18 PROCEDURE — 0DB68ZX EXCISION OF STOMACH, VIA NATURAL OR ARTIFICIAL OPENING ENDOSCOPIC, DIAGNOSTIC: ICD-10-PCS | Performed by: INTERNAL MEDICINE

## 2019-09-18 RX ORDER — SODIUM CHLORIDE, SODIUM LACTATE, POTASSIUM CHLORIDE, CALCIUM CHLORIDE 600; 310; 30; 20 MG/100ML; MG/100ML; MG/100ML; MG/100ML
INJECTION, SOLUTION INTRAVENOUS CONTINUOUS
Status: DISCONTINUED | OUTPATIENT
Start: 2019-09-18 | End: 2019-09-18

## 2019-09-18 RX ORDER — LIDOCAINE HYDROCHLORIDE 10 MG/ML
INJECTION, SOLUTION EPIDURAL; INFILTRATION; INTRACAUDAL; PERINEURAL AS NEEDED
Status: DISCONTINUED | OUTPATIENT
Start: 2019-09-18 | End: 2019-09-18 | Stop reason: SURG

## 2019-09-18 RX ORDER — NALOXONE HYDROCHLORIDE 0.4 MG/ML
80 INJECTION, SOLUTION INTRAMUSCULAR; INTRAVENOUS; SUBCUTANEOUS AS NEEDED
Status: DISCONTINUED | OUTPATIENT
Start: 2019-09-18 | End: 2019-09-18

## 2019-09-18 RX ORDER — DEXTROSE MONOHYDRATE 25 G/50ML
50 INJECTION, SOLUTION INTRAVENOUS
Status: DISCONTINUED | OUTPATIENT
Start: 2019-09-18 | End: 2019-09-18

## 2019-09-18 RX ADMIN — SODIUM CHLORIDE, SODIUM LACTATE, POTASSIUM CHLORIDE, CALCIUM CHLORIDE: 600; 310; 30; 20 INJECTION, SOLUTION INTRAVENOUS at 09:28:00

## 2019-09-18 RX ADMIN — LIDOCAINE HYDROCHLORIDE 50 MG: 10 INJECTION, SOLUTION EPIDURAL; INFILTRATION; INTRACAUDAL; PERINEURAL at 09:18:00

## 2019-09-18 NOTE — ANESTHESIA POSTPROCEDURE EVALUATION
Patient: Clearance Spurling    Procedure Summary     Date:  09/18/19 Room / Location:  [address] [place] [address] ENDOSCOPY 01 / [address] [place] [address] ENDOSCOPY    Anesthesia Start:  1695 Anesthesia Stop:  7741    Procedure:  ESOPHAGOGASTRODUODENOSCOPY (EGD) (N/A ) Diagnosis:       Gastroesophageal reflu

## 2019-09-18 NOTE — ANESTHESIA PREPROCEDURE EVALUATION
Anesthesia PreOp Note    HPI:     Antionette Nagel is a 79year old male who presents for preoperative consultation requested by: Angelina Scott MD    Date of Surgery: 9/18/2019    Procedure(s):  ESOPHAGOGASTRODUODENOSCOPY (EGD)  Indication: Gastroesophageal r nightly. Disp: 30 tablet Rfl: 0 at prn   Omeprazole 40 MG Oral Capsule Delayed Release Take 1 capsule (40 mg total) by mouth daily.  Take 1 capsule by mouth daily before breakfast. Disp: 30 capsule Rfl: 11 Taking   gabapentin 100 MG Oral Cap Take 1 capsule phone: Not on file        Gets together: Not on file        Attends Faith service: Not on file        Active member of club or organization: Not on file        Attends meetings of clubs or organizations: Not on file        Relationship status: Not on f negative ROS     GI/Hepatic/Renal    (+) GERD,     Endo/Other    (+) diabetes mellitus,     Comments: Blood sugar 171  Abdominal  - normal exam             Anesthesia Plan:   ASA:  2  Plan:   MAC  Informed Consent Plan and Risks Discussed With:  Patient  D

## 2019-09-18 NOTE — OPERATIVE REPORT
ESOPHAGOGASTRODUODENOSCOPY (EGD) REPORT    Edwin Echeverria    SERAFIN 10/11/1950 Age 79year old   PCP Randy Newton MD Endoscopist Omar Lee MD     Date of procedure: 19    Procedure: EGD w/cold biopsy    Pre-operative diagnosis: GERD    Post-ope duodenum. Recommend:  1. Await pathology. 2. Continue omeprazole 40mg/day long-term. 3. Avoid caffeine, chocolate, peppermint and alcohol.  Avoid laying down flat for 3 hours after a meal.    >>>If tissue was sampled/removed and you have not received y

## 2019-09-18 NOTE — H&P
History & Physical Examination    Patient Name: Amina Thomas  MRN: [id number]  CSN: [id number]  YOB: 1950    Diagnosis: GERD      Medications Prior to Admission:  ATORVASTATIN 20 MG Oral Tab TAKE 1 TABLET BY MOUTH NIGHTLY Disp: 90 tablet Rfl: Exam is Normal If not normal, please explain:   CHRIS Cobbus.Chahal ] [ Richelle Jarrell  Alcaeus.Chahal ] [ Maria Antonia Lockwood Alcaeus.Chahal ] [ Russell Wyatt Alcaeus.Chahal ] [ Jennifer Fire Alcaeus.Chahal ] [ Kellen Morris Alcaeus.Chahal ] [ X]    OTHER        I have discussed the risks and benefits and alternatives of the procedure

## 2019-09-23 ENCOUNTER — OFFICE VISIT (OUTPATIENT)
Dept: FAMILY MEDICINE CLINIC | Facility: CLINIC | Age: 69
End: 2019-09-23
Payer: MEDICARE

## 2019-09-23 VITALS
BODY MASS INDEX: 22.2 KG/M2 | SYSTOLIC BLOOD PRESSURE: 125 MMHG | WEIGHT: 130 LBS | HEART RATE: 93 BPM | DIASTOLIC BLOOD PRESSURE: 74 MMHG | TEMPERATURE: 98 F | HEIGHT: 64 IN

## 2019-09-23 DIAGNOSIS — K21.9 GASTROESOPHAGEAL REFLUX DISEASE WITHOUT ESOPHAGITIS: ICD-10-CM

## 2019-09-23 DIAGNOSIS — E11.9 CONTROLLED TYPE 2 DIABETES MELLITUS WITHOUT COMPLICATION, WITHOUT LONG-TERM CURRENT USE OF INSULIN (HCC): Primary | ICD-10-CM

## 2019-09-23 DIAGNOSIS — R05.3 CHRONIC COUGH: ICD-10-CM

## 2019-09-23 DIAGNOSIS — I10 ESSENTIAL HYPERTENSION: ICD-10-CM

## 2019-09-23 DIAGNOSIS — E78.00 HYPERCHOLESTEREMIA: ICD-10-CM

## 2019-09-23 DIAGNOSIS — B02.22 POST-HERPETIC TRIGEMINAL NEURALGIA: ICD-10-CM

## 2019-09-23 DIAGNOSIS — J84.10 PULMONARY FIBROSIS (HCC): ICD-10-CM

## 2019-09-23 PROCEDURE — 99214 OFFICE O/P EST MOD 30 MIN: CPT | Performed by: FAMILY MEDICINE

## 2019-09-23 NOTE — PROGRESS NOTES
HPI:    Patient ID: Krystle Marshall is a 79year old male.     HPI  Patient presents with:  Post-Op: ESOPHAGOGASTRODUODENOSCOPY  Follow - Up: on cough pt states he still coughs on and off     Had egd  Was told normal.  Has cough sometimes still  Has spells of co daily before breakfast. Disp: 30 capsule Rfl: 11   Polyethylene Glycol 3350 (MIRALAX) Oral Powd Pack Take 17 g by mouth daily. Disp: 1 each Rfl: 2   gabapentin 100 MG Oral Cap Take 1 capsule (100 mg total) by mouth 3 (three) times daily.  Disp: 90 capsule R like to wait  Has not done CT chest  Reminded to get CT chest done    4. Essential hypertension  Stable  cpm    5. Post-herpetic trigeminal neuralgia  Better  Slight discomfort in evening left ear  Increase gabapentin to 2 tabs bid    6.  Gastroesophageal r

## 2019-10-08 ENCOUNTER — TELEPHONE (OUTPATIENT)
Dept: FAMILY MEDICINE CLINIC | Facility: CLINIC | Age: 69
End: 2019-10-08

## 2019-10-12 RX ORDER — GABAPENTIN 100 MG/1
CAPSULE ORAL
Qty: 270 CAPSULE | Refills: 1 | Status: SHIPPED | OUTPATIENT
Start: 2019-10-12 | End: 2020-07-10

## 2019-10-12 NOTE — TELEPHONE ENCOUNTER
Refill passed per [address] [facility] protocol.   Refill Protocol Appointment Criteria  · Appointment scheduled in the past 6 months or in the next 3 months  Recent Outpatient Visits            2 weeks ago Controlled type 2 diabetes mellitus without complicatio

## 2019-10-14 NOTE — TELEPHONE ENCOUNTER
Review pended refill request as it does not fall under a protocol.   Requested Prescriptions     Pending Prescriptions Disp Refills   • DICLOFENAC SODIUM 1 % Transdermal Gel [Pharmacy Med Name: DICLOFENAC 1% GEL 100GM] 100 g 0     Sig: PLACE 1 APPLICATION O

## 2019-10-25 ENCOUNTER — APPOINTMENT (OUTPATIENT)
Dept: LAB | Age: 69
End: 2019-10-25
Attending: FAMILY MEDICINE
Payer: MEDICARE

## 2019-10-25 DIAGNOSIS — E11.9 DIABETES MELLITUS TYPE 2 WITHOUT RETINOPATHY (HCC): ICD-10-CM

## 2019-10-25 DIAGNOSIS — Z12.5 SCREENING PSA (PROSTATE SPECIFIC ANTIGEN): ICD-10-CM

## 2019-10-25 DIAGNOSIS — E78.00 HYPERCHOLESTEREMIA: ICD-10-CM

## 2019-10-25 PROCEDURE — 80061 LIPID PANEL: CPT

## 2019-10-25 PROCEDURE — 36415 COLL VENOUS BLD VENIPUNCTURE: CPT

## 2019-10-25 PROCEDURE — 83036 HEMOGLOBIN GLYCOSYLATED A1C: CPT

## 2019-10-25 PROCEDURE — 80053 COMPREHEN METABOLIC PANEL: CPT

## 2019-11-02 DIAGNOSIS — IMO0001 UNCONTROLLED TYPE 2 DIABETES MELLITUS WITHOUT COMPLICATION, WITHOUT LONG-TERM CURRENT USE OF INSULIN: Primary | ICD-10-CM

## 2019-11-12 ENCOUNTER — TELEPHONE (OUTPATIENT)
Dept: CASE MANAGEMENT | Age: 69
End: 2019-11-12

## 2019-11-12 NOTE — TELEPHONE ENCOUNTER
Patient is eligible for a 2019 Annual Medicare Health Assessment. Discussed in detail w/patient. Appt scheduled for 12/2/19.

## 2019-12-02 ENCOUNTER — OFFICE VISIT (OUTPATIENT)
Dept: FAMILY MEDICINE CLINIC | Facility: CLINIC | Age: 69
End: 2019-12-02
Payer: MEDICARE

## 2019-12-02 VITALS
BODY MASS INDEX: 23.22 KG/M2 | DIASTOLIC BLOOD PRESSURE: 76 MMHG | WEIGHT: 136 LBS | HEART RATE: 95 BPM | HEIGHT: 64 IN | TEMPERATURE: 97 F | SYSTOLIC BLOOD PRESSURE: 138 MMHG

## 2019-12-02 DIAGNOSIS — Z11.59 NEED FOR HEPATITIS C SCREENING TEST: ICD-10-CM

## 2019-12-02 DIAGNOSIS — I10 ESSENTIAL HYPERTENSION: ICD-10-CM

## 2019-12-02 DIAGNOSIS — B02.22 POST-HERPETIC TRIGEMINAL NEURALGIA: ICD-10-CM

## 2019-12-02 DIAGNOSIS — Z23 NEED FOR VACCINATION: ICD-10-CM

## 2019-12-02 DIAGNOSIS — Z00.00 ENCOUNTER FOR ANNUAL HEALTH EXAMINATION: ICD-10-CM

## 2019-12-02 DIAGNOSIS — E78.00 HYPERCHOLESTEREMIA: Primary | ICD-10-CM

## 2019-12-02 DIAGNOSIS — J84.10 PULMONARY FIBROSIS (HCC): ICD-10-CM

## 2019-12-02 DIAGNOSIS — K21.9 GASTROESOPHAGEAL REFLUX DISEASE WITHOUT ESOPHAGITIS: ICD-10-CM

## 2019-12-02 DIAGNOSIS — E11.9 DIABETES MELLITUS TYPE 2 WITHOUT RETINOPATHY (HCC): ICD-10-CM

## 2019-12-02 DIAGNOSIS — R05.3 CHRONIC COUGH: ICD-10-CM

## 2019-12-02 DIAGNOSIS — H91.92 DECREASED HEARING, LEFT: ICD-10-CM

## 2019-12-02 DIAGNOSIS — J84.9 INTERSTITIAL LUNG DISEASE (HCC): ICD-10-CM

## 2019-12-02 DIAGNOSIS — R22.2 PLEURAL NODULES: ICD-10-CM

## 2019-12-02 DIAGNOSIS — Z23 NEED FOR SHINGLES VACCINE: ICD-10-CM

## 2019-12-02 PROBLEM — IMO0001 UNCONTROLLED TYPE 2 DIABETES MELLITUS WITHOUT COMPLICATION, WITHOUT LONG-TERM CURRENT USE OF INSULIN: Status: RESOLVED | Noted: 2018-03-19 | Resolved: 2019-12-02

## 2019-12-02 PROCEDURE — 90732 PPSV23 VACC 2 YRS+ SUBQ/IM: CPT | Performed by: FAMILY MEDICINE

## 2019-12-02 PROCEDURE — 99397 PER PM REEVAL EST PAT 65+ YR: CPT | Performed by: FAMILY MEDICINE

## 2019-12-02 PROCEDURE — 90662 IIV NO PRSV INCREASED AG IM: CPT | Performed by: FAMILY MEDICINE

## 2019-12-02 PROCEDURE — G0009 ADMIN PNEUMOCOCCAL VACCINE: HCPCS | Performed by: FAMILY MEDICINE

## 2019-12-02 PROCEDURE — 96160 PT-FOCUSED HLTH RISK ASSMT: CPT | Performed by: FAMILY MEDICINE

## 2019-12-02 PROCEDURE — G0439 PPPS, SUBSEQ VISIT: HCPCS | Performed by: FAMILY MEDICINE

## 2019-12-02 PROCEDURE — G0008 ADMIN INFLUENZA VIRUS VAC: HCPCS | Performed by: FAMILY MEDICINE

## 2019-12-02 RX ORDER — ATORVASTATIN CALCIUM 20 MG/1
TABLET, FILM COATED ORAL
Qty: 90 TABLET | Refills: 3 | Status: SHIPPED | OUTPATIENT
Start: 2019-12-02 | End: 2020-04-02

## 2019-12-02 NOTE — PATIENT INSTRUCTIONS
Juan Antonio Vega's SCREENING SCHEDULE   Tests on this list are recommended by your physician but may not be covered, or covered at this frequency, by your insurer. Please check with your insurance carrier before scheduling to verify coverage.     PREVENTATIVE S years- more often if abnormal Colonoscopy due on 06/30/2026 Update Health Maintenance if applicable    Flex Sigmoidoscopy Screen  Covered every 5 years No results found for this or any previous visit. No flowsheet data found.      Fecal Occult Blood   Cover Medicare Part B) No orders found for this or any previous visit.  This may be covered with your prescription benefits, but Medicare does not cover unless Medically needed    Zoster (Not covered by Medicare Part B) No orders found for this or any previous vi

## 2019-12-02 NOTE — PROGRESS NOTES
HPI:   Evon Jenkins is a 81year old male who presents for a Medicare Subsequent Annual Wellness visit (Pt already had Initial Annual Wellness).       Annual Physical due on 12/02/2020        Fall/Risk Assessment   He has been screened for Falls and is low List:     Hypercholesteremia     Diabetes mellitus type 2 without retinopathy ([address]     Nuclear sclerotic cataract of both eyes     Essential hypertension     Pulmonary fibrosis ([address]     Coronary artery calcification seen on CAT scan     Right bundle bran INFORMATION:   He  has a past medical history of Acid reflux, Diabetes ([address] High cholesterol, and Hyperlipidemia. He  has a past surgical history that includes colonoscopy (2017) and other.     His family history includes Diabetes in his father and mot have told me they think I may have hearing loss:   No                 Visual Acuity  Right Eye Visual Acuity: Corrected Right Eye Chart Acuity: 20/20   Left Eye Visual Acuity: Corrected Left Eye Chart Acuity: 20/25   Both Eyes Visual Acuity: Corrected Both ASSESSMENT AND OTHER RELEVANT CHRONIC CONDITIONS:   Chasidy Prado is a 81year old male who presents for a Medicare Assessment.      PLAN SUMMARY:   Diagnoses and all orders for this visit:    Hypercholesteremia    Encounter for annual health examination - INTERNAL    11. Pleural nodules    - CT CHEST HI RESOLUTION (CPT=71250); Future  - PULMONARY - INTERNAL    12. Decreased hearing, left    - ENT - INTERNAL    13. Post-herpetic trigeminal neuralgia    - ENT - INTERNAL    14.  Need for shingles vaccine  [place] No flowsheet data found.     Glaucoma Screening      Ophthalmology Visit Annually: Diabetics, FHx Glaucoma, AA>50, > 65 Data entered on: 7/8/2019   Last Dilated Eye Exam 7/8/2019       Prostate Cancer Screening      PSA  Annually PSA due on 10/25/20 Creat/alb ratio  Annually Malb/Cre Calc (ug/mg)   Date Value   06/27/2019 13.4        LDL  Annually LDL Cholesterol (mg/dL)   Date Value   10/25/2019 77    No flowsheet data found.      Dilated Eye exam  Annually Data entered on: 7/8/2019   Last Dilated Eye

## 2019-12-06 ENCOUNTER — OFFICE VISIT (OUTPATIENT)
Dept: AUDIOLOGY | Facility: CLINIC | Age: 69
End: 2019-12-06
Payer: MEDICARE

## 2019-12-06 ENCOUNTER — OFFICE VISIT (OUTPATIENT)
Dept: OTOLARYNGOLOGY | Facility: CLINIC | Age: 69
End: 2019-12-06
Payer: MEDICARE

## 2019-12-06 VITALS
BODY MASS INDEX: 23.22 KG/M2 | TEMPERATURE: 98 F | HEIGHT: 64 IN | WEIGHT: 136 LBS | DIASTOLIC BLOOD PRESSURE: 70 MMHG | SYSTOLIC BLOOD PRESSURE: 145 MMHG

## 2019-12-06 DIAGNOSIS — H91.92 HEARING LOSS OF LEFT EAR, UNSPECIFIED HEARING LOSS TYPE: Primary | ICD-10-CM

## 2019-12-06 DIAGNOSIS — H90.A31 MIXED CONDUCTIVE AND SENSORINEURAL HEARING LOSS OF RIGHT EAR WITH RESTRICTED HEARING OF LEFT EAR: Primary | ICD-10-CM

## 2019-12-06 PROCEDURE — 99213 OFFICE O/P EST LOW 20 MIN: CPT | Performed by: OTOLARYNGOLOGY

## 2019-12-06 PROCEDURE — 92567 TYMPANOMETRY: CPT | Performed by: AUDIOLOGIST

## 2019-12-06 PROCEDURE — 92557 COMPREHENSIVE HEARING TEST: CPT | Performed by: AUDIOLOGIST

## 2019-12-06 NOTE — PROGRESS NOTES
Magali Abbasi is a 81year old male. Patient presents with:  Hearing Loss: decreased hearing of left ear for a year, LOV 4/19    HPI:   He was treated for shingles earlier in the year on the left side of his face.   He continues to feel that his left face is n with exertion  NEURO: denies headaches    EXAM:   /70   Temp 97.9 °F (36.6 °C) (Tympanic)   Ht 5' 4\" (1.626 m)   Wt 136 lb (61.7 kg)   BMI 23.34 kg/m²   System Findings Details   Skin Normal Inspection - Normal.   Constitutional Normal Overall appea plan.      Baird Ganser.  Jimy Monterroso MD  12/6/2019  12:00 PM

## 2019-12-06 NOTE — PROGRESS NOTES
AUDIOLOGY REPORT      Magali Abbasi is a 81year old male     Referring Provider: Renford Opitz   YOB: 1950  Medical Record: [id number]      Patient Hearing History:  Patient referred by Dr. Jimy Monterroso for hearing loss.       Otoscopic Inspection: clinic for Hearing Aid Evaluation to further explore communication needs, hearing aid styles, options, costs and levels of technology. Audiological monitoring as needed during the course of medical management.     12/6/2019  NGUYỄN Bluont

## 2019-12-20 ENCOUNTER — OFFICE VISIT (OUTPATIENT)
Dept: OTOLARYNGOLOGY | Facility: CLINIC | Age: 69
End: 2019-12-20
Payer: MEDICARE

## 2019-12-20 VITALS
DIASTOLIC BLOOD PRESSURE: 72 MMHG | TEMPERATURE: 98 F | SYSTOLIC BLOOD PRESSURE: 140 MMHG | HEIGHT: 64 IN | WEIGHT: 136 LBS | BODY MASS INDEX: 23.22 KG/M2

## 2019-12-20 DIAGNOSIS — H61.21 IMPACTED CERUMEN OF RIGHT EAR: ICD-10-CM

## 2019-12-20 DIAGNOSIS — H91.92 HEARING LOSS OF LEFT EAR, UNSPECIFIED HEARING LOSS TYPE: Primary | ICD-10-CM

## 2019-12-20 PROCEDURE — 99213 OFFICE O/P EST LOW 20 MIN: CPT | Performed by: OTOLARYNGOLOGY

## 2019-12-20 NOTE — PROGRESS NOTES
Melissa Mata is a 81year old male.  Patient presents with:  Ear Problem: pt here for a follow up on left ear hearing loss, per pt using ear drops, right hearing is better     HPI:   He has been experiencing pain in his left ear at times but this is much bett Cuff Size: adult)   Temp 97.8 °F (36.6 °C) (Tympanic)   Ht 5' 4\" (1.626 m)   Wt 136 lb (61.7 kg)   BMI 23.34 kg/m²   System Findings Details   Skin Normal Inspection - Normal.   Constitutional Normal Overall appearance - Normal.   Head/Face Normal Facial

## 2019-12-21 DIAGNOSIS — E11.9 DIABETES MELLITUS TYPE 2 WITHOUT RETINOPATHY (HCC): Primary | ICD-10-CM

## 2019-12-30 ENCOUNTER — TELEPHONE (OUTPATIENT)
Dept: GASTROENTEROLOGY | Facility: CLINIC | Age: 69
End: 2019-12-30

## 2019-12-30 RX ORDER — OMEPRAZOLE 40 MG/1
40 CAPSULE, DELAYED RELEASE ORAL DAILY
Qty: 90 CAPSULE | Refills: 3 | Status: SHIPPED | OUTPATIENT
Start: 2019-12-30 | End: 2020-12-24

## 2019-12-30 NOTE — TELEPHONE ENCOUNTER
Current Outpatient Medications   Medication Sig Dispense Refill   • Omeprazole 40 MG Oral Capsule Delayed Release Take 1 capsule (40 mg total) by mouth daily.  Take 1 capsule by mouth daily before breakfast. 30 capsule 11

## 2019-12-30 NOTE — TELEPHONE ENCOUNTER
Last seen for EGD on 9/18/2019: \"Continue omeprazole 40mg/day long-term. \"  Last seen for OV on 7/26/2019  Last refill on 7/26/2019 for 1 yr supply (30-day supply x 1 yr sent to retail pharmacy).     [facility] mail order pharmacy is requesting the script to b

## 2020-01-06 ENCOUNTER — TELEPHONE (OUTPATIENT)
Dept: FAMILY MEDICINE CLINIC | Facility: CLINIC | Age: 70
End: 2020-01-06

## 2020-01-06 DIAGNOSIS — E11.9 DIABETES MELLITUS TYPE 2 WITHOUT RETINOPATHY (HCC): ICD-10-CM

## 2020-01-06 NOTE — TELEPHONE ENCOUNTER
•  metFORMIN HCl 1000 MG Oral Tab, TAKE 1 TABLET BY MOUTH TWICE DAILY WITH MEALS, Disp: 180 tablet, Rfl: 1  •  atorvastatin 20 MG Oral Tab, TAKE 1 TABLET BY MOUTH

## 2020-01-07 RX ORDER — EMPAGLIFLOZIN 10 MG/1
TABLET, FILM COATED ORAL
Qty: 90 TABLET | Refills: 1 | OUTPATIENT
Start: 2020-01-07

## 2020-01-07 RX ORDER — GABAPENTIN 100 MG/1
CAPSULE ORAL
Qty: 270 CAPSULE | Refills: 1 | OUTPATIENT
Start: 2020-01-07

## 2020-01-07 NOTE — TELEPHONE ENCOUNTER
Duplicate request, previously addressed. Spoke with [facility] pharmacist and patient has refills on file.

## 2020-01-07 NOTE — TELEPHONE ENCOUNTER
rx failed protocol.  Abn A1c. please advise  Diabetes Medications  Protocol Criteria:  · Appointment scheduled in the past 6 months or the next 3 months  · A1C < 7.5 in the past 6 months  · Creatinine in the past 12 months  · Creatinine result < 1.5   Recen

## 2020-02-05 ENCOUNTER — TELEPHONE (OUTPATIENT)
Dept: FAMILY MEDICINE CLINIC | Facility: CLINIC | Age: 70
End: 2020-02-05

## 2020-02-05 DIAGNOSIS — E11.9 DIABETES MELLITUS TYPE 2 WITHOUT RETINOPATHY (HCC): Primary | ICD-10-CM

## 2020-02-05 RX ORDER — GLIMEPIRIDE 1 MG/1
1 TABLET ORAL
Qty: 90 TABLET | Refills: 0 | Status: SHIPPED | OUTPATIENT
Start: 2020-02-05 | End: 2020-04-06

## 2020-02-05 NOTE — TELEPHONE ENCOUNTER
Umm Mcgill from [facility] stated patient is requesting an alternative for Empagliflozin (JARDIANCE) 10 MG Oral Tab because it is too expensive. Umm Mcgill stated the glipizide er is an alternative. Please send new order, if Dr. Carol Oneill. Thank you.

## 2020-02-05 NOTE — TELEPHONE ENCOUNTER
Will send in glimepriride 1 mg daily   Educate patient to eat meals on time , avoid skipping meals due to risk of hypoglycemia

## 2020-02-05 NOTE — TELEPHONE ENCOUNTER
Spoke with [facility] Pharmacist who ran [employer] and states it is non formulary and not covered by pt insurance. Please advise.

## 2020-02-24 ENCOUNTER — APPOINTMENT (OUTPATIENT)
Dept: LAB | Age: 70
End: 2020-02-24
Attending: FAMILY MEDICINE
Payer: MEDICARE

## 2020-02-24 DIAGNOSIS — IMO0001 UNCONTROLLED TYPE 2 DIABETES MELLITUS WITHOUT COMPLICATION, WITHOUT LONG-TERM CURRENT USE OF INSULIN: ICD-10-CM

## 2020-02-24 DIAGNOSIS — Z11.59 NEED FOR HEPATITIS C SCREENING TEST: ICD-10-CM

## 2020-02-24 LAB
ALBUMIN SERPL-MCNC: 4.2 G/DL (ref 3.4–5)
ALBUMIN/GLOB SERPL: 1.4 {RATIO} (ref 1–2)
ALP LIVER SERPL-CCNC: 67 U/L (ref 45–117)
ALT SERPL-CCNC: 17 U/L (ref 16–61)
ANION GAP SERPL CALC-SCNC: 5 MMOL/L (ref 0–18)
AST SERPL-CCNC: 20 U/L (ref 15–37)
BILIRUB SERPL-MCNC: 0.6 MG/DL (ref 0.1–2)
BUN BLD-MCNC: 10 MG/DL (ref 7–18)
BUN/CREAT SERPL: 10.6 (ref 10–20)
CALCIUM BLD-MCNC: 9.6 MG/DL (ref 8.5–10.1)
CHLORIDE SERPL-SCNC: 105 MMOL/L (ref 98–112)
CO2 SERPL-SCNC: 29 MMOL/L (ref 21–32)
CREAT BLD-MCNC: 0.94 MG/DL (ref 0.7–1.3)
EST. AVERAGE GLUCOSE BLD GHB EST-MCNC: 157 MG/DL (ref 68–126)
GLOBULIN PLAS-MCNC: 3.1 G/DL (ref 2.8–4.4)
GLUCOSE BLD-MCNC: 101 MG/DL (ref 70–99)
HBA1C MFR BLD HPLC: 7.1 % (ref ?–5.7)
HCV AB SERPL QL IA: NONREACTIVE
M PROTEIN MFR SERPL ELPH: 7.3 G/DL (ref 6.4–8.2)
OSMOLALITY SERPL CALC.SUM OF ELEC: 287 MOSM/KG (ref 275–295)
PATIENT FASTING Y/N/NP: YES
POTASSIUM SERPL-SCNC: 4.2 MMOL/L (ref 3.5–5.1)
SODIUM SERPL-SCNC: 139 MMOL/L (ref 136–145)

## 2020-02-24 PROCEDURE — 36415 COLL VENOUS BLD VENIPUNCTURE: CPT

## 2020-02-24 PROCEDURE — 80053 COMPREHEN METABOLIC PANEL: CPT

## 2020-02-24 PROCEDURE — 83036 HEMOGLOBIN GLYCOSYLATED A1C: CPT

## 2020-02-24 PROCEDURE — 86803 HEPATITIS C AB TEST: CPT

## 2020-03-02 ENCOUNTER — OFFICE VISIT (OUTPATIENT)
Dept: FAMILY MEDICINE CLINIC | Facility: CLINIC | Age: 70
End: 2020-03-02
Payer: MEDICARE

## 2020-03-02 VITALS
HEART RATE: 72 BPM | BODY MASS INDEX: 22.88 KG/M2 | WEIGHT: 134 LBS | SYSTOLIC BLOOD PRESSURE: 145 MMHG | DIASTOLIC BLOOD PRESSURE: 87 MMHG | HEIGHT: 64 IN | TEMPERATURE: 97 F

## 2020-03-02 DIAGNOSIS — H25.13 NUCLEAR SCLEROTIC CATARACT OF BOTH EYES: ICD-10-CM

## 2020-03-02 DIAGNOSIS — E11.9 DIABETES MELLITUS TYPE 2 WITHOUT RETINOPATHY (HCC): Primary | ICD-10-CM

## 2020-03-02 DIAGNOSIS — I10 ESSENTIAL HYPERTENSION: ICD-10-CM

## 2020-03-02 DIAGNOSIS — E78.00 HYPERCHOLESTEREMIA: ICD-10-CM

## 2020-03-02 PROCEDURE — 99214 OFFICE O/P EST MOD 30 MIN: CPT | Performed by: FAMILY MEDICINE

## 2020-03-02 RX ORDER — EMPAGLIFLOZIN 10 MG/1
TABLET, FILM COATED ORAL
COMMUNITY
Start: 2020-02-03 | End: 2020-03-02

## 2020-03-02 NOTE — PROGRESS NOTES
HPI:    Patient ID: Anshu Payne is a 81year old male.     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        Allergies:No Known Allergies   PHYSICAL EXAM:   Patient presents with:  Diabetes: follow up   Cholesterol  Blood Pressure: follow up   Lab Results     Physical Exam   Constitutional: He a

## 2020-04-02 RX ORDER — ATORVASTATIN CALCIUM 20 MG/1
TABLET, FILM COATED ORAL
Qty: 90 TABLET | Refills: 3 | Status: SHIPPED | OUTPATIENT
Start: 2020-04-02 | End: 2021-02-17

## 2020-04-06 DIAGNOSIS — E11.9 DIABETES MELLITUS TYPE 2 WITHOUT RETINOPATHY (HCC): ICD-10-CM

## 2020-04-06 RX ORDER — GLIMEPIRIDE 1 MG/1
TABLET ORAL
Qty: 90 TABLET | Refills: 0 | Status: SHIPPED | OUTPATIENT
Start: 2020-04-06 | End: 2020-06-15

## 2020-05-21 ENCOUNTER — TELEPHONE (OUTPATIENT)
Dept: FAMILY MEDICINE CLINIC | Facility: CLINIC | Age: 70
End: 2020-05-21

## 2020-05-21 DIAGNOSIS — E11.9 DIABETES MELLITUS TYPE 2 WITHOUT RETINOPATHY (HCC): Primary | ICD-10-CM

## 2020-05-21 DIAGNOSIS — I10 ESSENTIAL HYPERTENSION: ICD-10-CM

## 2020-05-21 DIAGNOSIS — E78.00 HYPERCHOLESTEREMIA: ICD-10-CM

## 2020-05-21 NOTE — TELEPHONE ENCOUNTER
Dr. John Townsend, patient is schedule for a Medicare Physical on 06/02/2020. Patient is requesting blood test order for appointment. Please advise.

## 2020-05-28 ENCOUNTER — APPOINTMENT (OUTPATIENT)
Dept: LAB | Age: 70
End: 2020-05-28
Attending: FAMILY MEDICINE
Payer: MEDICARE

## 2020-05-28 DIAGNOSIS — E11.9 DIABETES MELLITUS TYPE 2 WITHOUT RETINOPATHY (HCC): ICD-10-CM

## 2020-05-28 DIAGNOSIS — E78.00 HYPERCHOLESTEREMIA: ICD-10-CM

## 2020-05-28 PROCEDURE — 80061 LIPID PANEL: CPT

## 2020-05-28 PROCEDURE — 82570 ASSAY OF URINE CREATININE: CPT

## 2020-05-28 PROCEDURE — 82043 UR ALBUMIN QUANTITATIVE: CPT

## 2020-05-28 PROCEDURE — 83036 HEMOGLOBIN GLYCOSYLATED A1C: CPT

## 2020-05-28 PROCEDURE — 36415 COLL VENOUS BLD VENIPUNCTURE: CPT

## 2020-06-15 ENCOUNTER — OFFICE VISIT (OUTPATIENT)
Dept: FAMILY MEDICINE CLINIC | Facility: CLINIC | Age: 70
End: 2020-06-15
Payer: MEDICARE

## 2020-06-15 VITALS
TEMPERATURE: 97 F | WEIGHT: 133 LBS | HEIGHT: 64 IN | HEART RATE: 71 BPM | BODY MASS INDEX: 22.71 KG/M2 | SYSTOLIC BLOOD PRESSURE: 122 MMHG | DIASTOLIC BLOOD PRESSURE: 71 MMHG

## 2020-06-15 DIAGNOSIS — K21.9 GASTROESOPHAGEAL REFLUX DISEASE WITHOUT ESOPHAGITIS: ICD-10-CM

## 2020-06-15 DIAGNOSIS — I45.10 RIGHT BUNDLE BRANCH BLOCK: ICD-10-CM

## 2020-06-15 DIAGNOSIS — K44.9 HIATAL HERNIA: ICD-10-CM

## 2020-06-15 DIAGNOSIS — B02.22 POST-HERPETIC TRIGEMINAL NEURALGIA: ICD-10-CM

## 2020-06-15 DIAGNOSIS — E11.9 DIABETES MELLITUS TYPE 2 WITHOUT RETINOPATHY (HCC): ICD-10-CM

## 2020-06-15 DIAGNOSIS — I25.10 CORONARY ARTERY CALCIFICATION SEEN ON CAT SCAN: ICD-10-CM

## 2020-06-15 DIAGNOSIS — H90.A31 MIXED CONDUCTIVE AND SENSORINEURAL HEARING LOSS OF RIGHT EAR WITH RESTRICTED HEARING OF LEFT EAR: ICD-10-CM

## 2020-06-15 DIAGNOSIS — J84.9 INTERSTITIAL LUNG DISEASE (HCC): ICD-10-CM

## 2020-06-15 DIAGNOSIS — I10 ESSENTIAL HYPERTENSION: ICD-10-CM

## 2020-06-15 DIAGNOSIS — H25.13 NUCLEAR SCLEROTIC CATARACT OF BOTH EYES: ICD-10-CM

## 2020-06-15 DIAGNOSIS — E78.00 HYPERCHOLESTEREMIA: ICD-10-CM

## 2020-06-15 DIAGNOSIS — R05.3 CHRONIC COUGH: ICD-10-CM

## 2020-06-15 DIAGNOSIS — Z00.00 ENCOUNTER FOR ANNUAL HEALTH EXAMINATION: Primary | ICD-10-CM

## 2020-06-15 PROBLEM — J84.10 PULMONARY FIBROSIS (HCC): Status: RESOLVED | Noted: 2018-04-19 | Resolved: 2020-06-15

## 2020-06-15 PROBLEM — R22.2 PLEURAL NODULES: Status: RESOLVED | Noted: 2019-12-02 | Resolved: 2020-06-15

## 2020-06-15 PROBLEM — R07.9 CHEST PAIN: Status: RESOLVED | Noted: 2019-07-29 | Resolved: 2020-06-15

## 2020-06-15 PROCEDURE — G0439 PPPS, SUBSEQ VISIT: HCPCS | Performed by: FAMILY MEDICINE

## 2020-06-15 PROCEDURE — 99397 PER PM REEVAL EST PAT 65+ YR: CPT | Performed by: FAMILY MEDICINE

## 2020-06-15 PROCEDURE — 96160 PT-FOCUSED HLTH RISK ASSMT: CPT | Performed by: FAMILY MEDICINE

## 2020-06-15 NOTE — PROGRESS NOTES
HPI:   Jair Lemus is a 81year old male who presents for a MA (Medicare Advantage) [facility] (Once per calendar year). 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     He MD (Physical Medicine)    Patient Active Problem List:     Hypercholesteremia     Diabetes mellitus type 2 without retinopathy ([address]     Nuclear sclerotic cataract of both eyes     Essential hypertension     Coronary artery calcification seen on CAT scan Hyperlipidemia. He  has a past surgical history that includes colonoscopy (2017) and other. His family history includes Diabetes in his father and mother. SOCIAL HISTORY:   He  reports that he has never smoked.  He has never used smokeless tobacco. Visual Acuity                           General Appearance:  Alert, cooperative, no distress, appears stated age   Head:  Normocephalic, without obvious abnormality, atraumatic   Eyes:  PERRL, conjunctiva/corneas clear, EOM's intact, both eyes examination    Hypercholesteremia    Diabetes mellitus type 2 without retinopathy ([facility]  -     Empagliflozin (JARDIANCE) 10 MG Oral Tab; Take 1 tablet by mouth daily.     Essential hypertension    Coronary artery calcification seen on CAT scan    Right bund General Health     In the past six months, have you lost more than 10 pounds without trying?: 2 - No  Has your appetite been poor?: No  How does the patient maintain a good energy level?: Daily Walks  How would you describe your daily physical activity 67th birthday    Hepatitis B for Moderate/High Risk No vaccine history found Medium/high risk factors:   End-stage renal disease   Hemophiliacs who received Factor VIII or IX concentrates   Clients of institutions for the mentally retarded   Persons who li

## 2020-06-16 ENCOUNTER — MED REC SCAN ONLY (OUTPATIENT)
Dept: FAMILY MEDICINE CLINIC | Facility: CLINIC | Age: 70
End: 2020-06-16

## 2020-07-08 DIAGNOSIS — E11.9 DIABETES MELLITUS TYPE 2 WITHOUT RETINOPATHY (HCC): ICD-10-CM

## 2020-07-10 RX ORDER — GABAPENTIN 100 MG/1
CAPSULE ORAL
Qty: 270 CAPSULE | Refills: 1 | Status: SHIPPED | OUTPATIENT
Start: 2020-07-10 | End: 2020-12-15

## 2020-08-09 RX ORDER — GLIMEPIRIDE 1 MG/1
TABLET ORAL
Qty: 90 TABLET | Refills: 0 | OUTPATIENT
Start: 2020-08-09

## 2020-08-13 ENCOUNTER — OFFICE VISIT (OUTPATIENT)
Dept: FAMILY MEDICINE CLINIC | Facility: CLINIC | Age: 70
End: 2020-08-13
Payer: MEDICARE

## 2020-08-13 ENCOUNTER — APPOINTMENT (OUTPATIENT)
Dept: LAB | Age: 70
End: 2020-08-13
Attending: FAMILY MEDICINE
Payer: MEDICARE

## 2020-08-13 VITALS
TEMPERATURE: 98 F | DIASTOLIC BLOOD PRESSURE: 72 MMHG | HEART RATE: 76 BPM | HEIGHT: 64 IN | BODY MASS INDEX: 22.02 KG/M2 | SYSTOLIC BLOOD PRESSURE: 129 MMHG | WEIGHT: 129 LBS

## 2020-08-13 DIAGNOSIS — E11.9 DIABETES MELLITUS TYPE 2 WITHOUT RETINOPATHY (HCC): ICD-10-CM

## 2020-08-13 DIAGNOSIS — M79.632 LEFT FOREARM PAIN: Primary | ICD-10-CM

## 2020-08-13 DIAGNOSIS — M79.632 LEFT FOREARM PAIN: ICD-10-CM

## 2020-08-13 PROCEDURE — 3078F DIAST BP <80 MM HG: CPT | Performed by: FAMILY MEDICINE

## 2020-08-13 PROCEDURE — 93005 ELECTROCARDIOGRAM TRACING: CPT

## 2020-08-13 PROCEDURE — 3008F BODY MASS INDEX DOCD: CPT | Performed by: FAMILY MEDICINE

## 2020-08-13 PROCEDURE — 93010 ELECTROCARDIOGRAM REPORT: CPT | Performed by: FAMILY MEDICINE

## 2020-08-13 PROCEDURE — 3074F SYST BP LT 130 MM HG: CPT | Performed by: FAMILY MEDICINE

## 2020-08-13 PROCEDURE — 99214 OFFICE O/P EST MOD 30 MIN: CPT | Performed by: FAMILY MEDICINE

## 2020-08-13 RX ORDER — GLIMEPIRIDE 1 MG/1
1 TABLET ORAL
Qty: 90 TABLET | Refills: 1 | Status: SHIPPED | OUTPATIENT
Start: 2020-08-13 | End: 2020-12-15

## 2020-08-13 NOTE — PROGRESS NOTES
HPI:    Patient ID: Arina Raza is a 81year old male.     HPI  Patient presents with:  Arm Pain: left forearm X 3 weeks has been putting bengay and it helps   Other: would like to know if he should still be taking jardinance or glimepiride     Patient state • ATORVASTATIN 20 MG Oral Tab TAKE 1 TABLET BY MOUTH NIGHTLY 90 tablet 3   • Omeprazole 40 MG Oral Capsule Delayed Release Take 1 capsule (40 mg total) by mouth daily.  Take 1 capsule by mouth daily before breakfast. 90 capsule 3   • DICLOFENAC SODIUM 1 % Tab; Take 1 tablet (1 mg total) by mouth daily with breakfast.  Dispense: 90 tablet; Refill: 1      No orders of the defined types were placed in this encounter.       Meds This Visit:  Requested Prescriptions     Signed Prescriptions Disp Refills   • glime

## 2020-08-14 ENCOUNTER — TELEPHONE (OUTPATIENT)
Dept: FAMILY MEDICINE CLINIC | Facility: CLINIC | Age: 70
End: 2020-08-14

## 2020-08-14 DIAGNOSIS — E11.9 CONTROLLED TYPE 2 DIABETES MELLITUS WITHOUT COMPLICATION, WITHOUT LONG-TERM CURRENT USE OF INSULIN (HCC): Primary | ICD-10-CM

## 2020-08-14 NOTE — TELEPHONE ENCOUNTER
Rx: Accu-Chek Hemalatha Solution    Rx:Accu-Chek Hemalatha Plus Meter    Rx: Accu-Chek Hemalatha Plus Test Strip    Rx: Accu-Chek SoftClix Lancets

## 2020-08-15 RX ORDER — BLOOD-GLUCOSE METER
EACH MISCELLANEOUS
Qty: 1 KIT | Refills: 0 | Status: SHIPPED | OUTPATIENT
Start: 2020-08-15 | End: 2021-03-16

## 2020-08-15 RX ORDER — BLOOD SUGAR DIAGNOSTIC
STRIP MISCELLANEOUS
Qty: 100 STRIP | Refills: 3 | Status: SHIPPED | OUTPATIENT
Start: 2020-08-15 | End: 2021-03-16

## 2020-08-15 RX ORDER — LANCETS
EACH MISCELLANEOUS
Qty: 100 EACH | Refills: 3 | Status: SHIPPED | OUTPATIENT
Start: 2020-08-15 | End: 2021-03-16

## 2020-08-15 RX ORDER — BLOOD GLUCOSE CONTROL HIGH,LOW
EACH MISCELLANEOUS
Qty: 1 EACH | Refills: 0 | Status: SHIPPED | OUTPATIENT
Start: 2020-08-15 | End: 2021-03-16

## 2020-11-23 ENCOUNTER — TELEPHONE (OUTPATIENT)
Dept: FAMILY MEDICINE CLINIC | Facility: CLINIC | Age: 70
End: 2020-11-23

## 2020-11-23 DIAGNOSIS — E11.9 CONTROLLED TYPE 2 DIABETES MELLITUS WITHOUT COMPLICATION, WITHOUT LONG-TERM CURRENT USE OF INSULIN (HCC): ICD-10-CM

## 2020-11-23 DIAGNOSIS — Z12.5 PROSTATE CANCER SCREENING: ICD-10-CM

## 2020-11-23 DIAGNOSIS — E78.00 HYPERCHOLESTEREMIA: ICD-10-CM

## 2020-11-23 DIAGNOSIS — I10 ESSENTIAL HYPERTENSION: Primary | ICD-10-CM

## 2020-11-27 ENCOUNTER — LAB ENCOUNTER (OUTPATIENT)
Dept: LAB | Age: 70
End: 2020-11-27
Attending: FAMILY MEDICINE
Payer: MEDICARE

## 2020-11-27 DIAGNOSIS — I10 ESSENTIAL HYPERTENSION: ICD-10-CM

## 2020-11-27 DIAGNOSIS — E78.00 HYPERCHOLESTEREMIA: ICD-10-CM

## 2020-11-27 DIAGNOSIS — E11.9 CONTROLLED TYPE 2 DIABETES MELLITUS WITHOUT COMPLICATION, WITHOUT LONG-TERM CURRENT USE OF INSULIN (HCC): ICD-10-CM

## 2020-11-27 DIAGNOSIS — E11.9 DIABETES MELLITUS TYPE 2 WITHOUT RETINOPATHY (HCC): ICD-10-CM

## 2020-11-27 DIAGNOSIS — Z12.5 PROSTATE CANCER SCREENING: ICD-10-CM

## 2020-11-27 PROCEDURE — 80053 COMPREHEN METABOLIC PANEL: CPT

## 2020-11-27 PROCEDURE — 36415 COLL VENOUS BLD VENIPUNCTURE: CPT

## 2020-11-27 PROCEDURE — 83036 HEMOGLOBIN GLYCOSYLATED A1C: CPT

## 2020-11-27 PROCEDURE — 80061 LIPID PANEL: CPT

## 2020-12-10 ENCOUNTER — TELEPHONE (OUTPATIENT)
Dept: FAMILY MEDICINE CLINIC | Facility: CLINIC | Age: 70
End: 2020-12-10

## 2020-12-10 NOTE — TELEPHONE ENCOUNTER
Received Medical records request from [facility] authorization form faxed to our records dept

## 2020-12-15 ENCOUNTER — OFFICE VISIT (OUTPATIENT)
Dept: FAMILY MEDICINE CLINIC | Facility: CLINIC | Age: 70
End: 2020-12-15
Payer: MEDICARE

## 2020-12-15 VITALS
HEIGHT: 64 IN | TEMPERATURE: 97 F | OXYGEN SATURATION: 99 % | HEART RATE: 89 BPM | DIASTOLIC BLOOD PRESSURE: 69 MMHG | SYSTOLIC BLOOD PRESSURE: 138 MMHG | WEIGHT: 132 LBS | BODY MASS INDEX: 22.53 KG/M2

## 2020-12-15 DIAGNOSIS — E78.00 HYPERCHOLESTEREMIA: Primary | ICD-10-CM

## 2020-12-15 DIAGNOSIS — I10 ESSENTIAL HYPERTENSION: ICD-10-CM

## 2020-12-15 DIAGNOSIS — E11.9 DIABETES MELLITUS TYPE 2 WITHOUT RETINOPATHY (HCC): ICD-10-CM

## 2020-12-15 PROCEDURE — 3008F BODY MASS INDEX DOCD: CPT | Performed by: FAMILY MEDICINE

## 2020-12-15 PROCEDURE — 3075F SYST BP GE 130 - 139MM HG: CPT | Performed by: FAMILY MEDICINE

## 2020-12-15 PROCEDURE — 99214 OFFICE O/P EST MOD 30 MIN: CPT | Performed by: FAMILY MEDICINE

## 2020-12-15 PROCEDURE — 3078F DIAST BP <80 MM HG: CPT | Performed by: FAMILY MEDICINE

## 2020-12-31 ENCOUNTER — TELEPHONE (OUTPATIENT)
Dept: FAMILY MEDICINE CLINIC | Facility: CLINIC | Age: 70
End: 2020-12-31

## 2021-01-06 ENCOUNTER — TELEPHONE (OUTPATIENT)
Dept: FAMILY MEDICINE CLINIC | Facility: CLINIC | Age: 71
End: 2021-01-06

## 2021-01-07 NOTE — TELEPHONE ENCOUNTER
Received request for medical records to be send to [address] [place] [address] in [facility] request faxed to our Medical Records dept.

## 2021-01-20 ENCOUNTER — TELEPHONE (OUTPATIENT)
Dept: FAMILY MEDICINE CLINIC | Facility: CLINIC | Age: 71
End: 2021-01-20

## 2021-01-20 NOTE — TELEPHONE ENCOUNTER
Patient calling for refills of Metformin, Atorvastatin, and Omeprazole 20mg. Advised refill for Atorvastatin at [place], sent for 1 year. Patient reports will follow up with Michael today and have the pharmacy send a request for any refills needed.

## 2021-01-21 ENCOUNTER — TELEPHONE (OUTPATIENT)
Dept: FAMILY MEDICINE CLINIC | Facility: CLINIC | Age: 71
End: 2021-01-21

## 2021-01-21 NOTE — TELEPHONE ENCOUNTER
He can try stopping the medication. Patient was prescribed for acid reflux.   Recommend avoiding any spicy foods fried foods  Patient to follow-up if symptoms reappear with some heartburn or cough especially at night

## 2021-01-21 NOTE — TELEPHONE ENCOUNTER
Pt states he signed a release at Dr Dior Guzman office to give access to [employer] to obtain his records. Pt states [employer] has not received release.     Routed to on site staff for assist.

## 2021-01-21 NOTE — TELEPHONE ENCOUNTER
Pt states he is not having any acid reflux issues or stomach problems. Per pt he has been taking Omeprazole for over a year. Pt asking if he still needs to take the Omeprazole.   Pt did not want to consult with prescribing GI doctor and wants Verna Colbert

## 2021-01-21 NOTE — TELEPHONE ENCOUNTER
Patient wants to follow up his medical records-states that he signed the release paper 2 weeks ago and his insurance has not receive it yet. Medical records number provided and instructed to call for follow up, states that he already called the medical andry

## 2021-01-21 NOTE — TELEPHONE ENCOUNTER
Spoke with patient (verified name and ), advised Dr Joshi Brochure note and verbalized understanding. He can try stopping the medication. Patient was prescribed for acid reflux.   Recommend avoiding any spicy foods fried foods  Patient to follow-up if sy

## 2021-01-22 RX ORDER — OMEPRAZOLE 40 MG/1
CAPSULE, DELAYED RELEASE ORAL
Qty: 30 CAPSULE | Refills: 11 | OUTPATIENT
Start: 2021-01-22

## 2021-01-29 NOTE — TELEPHONE ENCOUNTER
Spoke with pt,  verified, pt req rx ref for omeprazole 40 mg every day. pls advise, thanks in advance.          Requested Prescriptions     Pending Prescriptions Disp Refills   • Omeprazole 40 MG Oral Capsule Delayed Release 90 capsule 1     Sig: Take

## 2021-01-31 RX ORDER — OMEPRAZOLE 40 MG/1
40 CAPSULE, DELAYED RELEASE ORAL DAILY
Qty: 90 CAPSULE | Refills: 1 | OUTPATIENT
Start: 2021-01-31

## 2021-02-01 DIAGNOSIS — Z23 NEED FOR VACCINATION: ICD-10-CM

## 2021-02-01 RX ORDER — OMEPRAZOLE 40 MG/1
40 CAPSULE, DELAYED RELEASE ORAL DAILY
Qty: 90 CAPSULE | Refills: 2 | Status: SHIPPED | OUTPATIENT
Start: 2021-02-01 | End: 2021-03-16

## 2021-02-01 NOTE — TELEPHONE ENCOUNTER
Dr. Patti Gray    Patient requesting refill of omeprazole  Please review and sign below pended order if appropriate.     Thank you      LR 12/30/2019  Last Seen 9/18/2019-Patient has appointment to see you in April 2021    Omeprazole 40 MG Oral Capsule Delayed R

## 2021-02-01 NOTE — TELEPHONE ENCOUNTER
Requested Prescriptions     Pending Prescriptions Disp Refills   • OMEPRAZOLE 40 MG Oral Capsule Delayed Release [Pharmacy Med Name: OMEPRAZOLE 40MG CAPSULES] 30 capsule 11     Sig: TAKE 1 CAPSULE(40 MG) BY MOUTH DAILY BEFORE BREAKFAST     LOV 7/26/19

## 2021-02-02 ENCOUNTER — TELEPHONE (OUTPATIENT)
Dept: FAMILY MEDICINE CLINIC | Facility: CLINIC | Age: 71
End: 2021-02-02

## 2021-02-02 RX ORDER — OMEPRAZOLE 40 MG/1
CAPSULE, DELAYED RELEASE ORAL
Qty: 30 CAPSULE | Refills: 11 | Status: SHIPPED | OUTPATIENT
Start: 2021-02-02 | End: 2021-02-04

## 2021-02-04 ENCOUNTER — TELEPHONE (OUTPATIENT)
Dept: FAMILY MEDICINE CLINIC | Facility: CLINIC | Age: 71
End: 2021-02-04

## 2021-02-04 RX ORDER — OMEPRAZOLE 40 MG/1
40 CAPSULE, DELAYED RELEASE ORAL DAILY
Qty: 30 CAPSULE | Refills: 11 | Status: SHIPPED | OUTPATIENT
Start: 2021-02-04 | End: 2021-11-08

## 2021-02-04 NOTE — TELEPHONE ENCOUNTER
Omeprazole 40 mg was sent to wrong [facility] in [facility]. Prefers [facility] in [place]. Chart reviewed, 2/2/21 prescription by Dr Cunningham He resent to pts' preferred pharmacy closer to his home. Pt thankful.

## 2021-02-09 ENCOUNTER — IMMUNIZATION (OUTPATIENT)
Dept: LAB | Age: 71
End: 2021-02-09
Attending: HOSPITALIST
Payer: MEDICARE

## 2021-02-09 DIAGNOSIS — Z23 NEED FOR VACCINATION: Primary | ICD-10-CM

## 2021-02-09 PROCEDURE — 0001A SARSCOV2 VAC 30MCG/0.3ML IM: CPT

## 2021-02-16 DIAGNOSIS — E11.9 DIABETES MELLITUS TYPE 2 WITHOUT RETINOPATHY (HCC): ICD-10-CM

## 2021-02-16 NOTE — TELEPHONE ENCOUNTER
•  Omeprazole 40 MG Oral Capsule Delayed Release, Take 1 capsule (40 mg total) by mouth daily.  Take 1 capsule by mouth daily before breakfast., Disp: 90 capsule, Rfl: 2  sugars once daily, Disp: 100 each, Rfl: 3  •  metFORMIN HCl 1000 MG Oral Tab, TAKE 1

## 2021-02-17 NOTE — TELEPHONE ENCOUNTER
Left message asking patient if he is wanting omeprazole to [facility] or keep it at [facility] where it was sent to on 2/4/21

## 2021-02-17 NOTE — TELEPHONE ENCOUNTER
Patient states he wants omeprazole sent [facility]. Omeprazole however sent to pharmacy on 2/1/21 and 2/4/21.

## 2021-02-18 RX ORDER — ATORVASTATIN CALCIUM 20 MG/1
20 TABLET, FILM COATED ORAL NIGHTLY
Qty: 90 TABLET | Refills: 1 | Status: SHIPPED | OUTPATIENT
Start: 2021-02-18 | End: 2021-07-31

## 2021-02-19 ENCOUNTER — TELEPHONE (OUTPATIENT)
Dept: FAMILY MEDICINE CLINIC | Facility: CLINIC | Age: 71
End: 2021-02-19

## 2021-02-19 NOTE — TELEPHONE ENCOUNTER
[email or web address] message sent to patient. Patient Review of Clinical Information    Problems   The patient or proxy has not reviewed this information.    Medications   The patient or proxy has not reviewed this information, and there are updates pending:   Jose

## 2021-03-02 ENCOUNTER — IMMUNIZATION (OUTPATIENT)
Dept: LAB | Age: 71
End: 2021-03-02
Attending: HOSPITALIST
Payer: MEDICARE

## 2021-03-02 DIAGNOSIS — Z23 NEED FOR VACCINATION: Primary | ICD-10-CM

## 2021-03-02 PROCEDURE — 0002A SARSCOV2 VAC 30MCG/0.3ML IM: CPT

## 2021-03-16 ENCOUNTER — OFFICE VISIT (OUTPATIENT)
Dept: FAMILY MEDICINE CLINIC | Facility: CLINIC | Age: 71
End: 2021-03-16
Payer: COMMERCIAL

## 2021-03-16 ENCOUNTER — EKG ENCOUNTER (OUTPATIENT)
Dept: LAB | Age: 71
End: 2021-03-16
Attending: FAMILY MEDICINE
Payer: MEDICARE

## 2021-03-16 ENCOUNTER — LAB ENCOUNTER (OUTPATIENT)
Dept: LAB | Age: 71
End: 2021-03-16
Attending: FAMILY MEDICINE
Payer: MEDICARE

## 2021-03-16 VITALS
DIASTOLIC BLOOD PRESSURE: 79 MMHG | WEIGHT: 130 LBS | TEMPERATURE: 97 F | SYSTOLIC BLOOD PRESSURE: 130 MMHG | HEART RATE: 79 BPM | BODY MASS INDEX: 22.2 KG/M2 | HEIGHT: 64 IN

## 2021-03-16 DIAGNOSIS — I49.1 PAC (PREMATURE ATRIAL CONTRACTION): ICD-10-CM

## 2021-03-16 DIAGNOSIS — E78.00 HYPERCHOLESTEREMIA: ICD-10-CM

## 2021-03-16 DIAGNOSIS — K21.9 GASTROESOPHAGEAL REFLUX DISEASE WITHOUT ESOPHAGITIS: ICD-10-CM

## 2021-03-16 DIAGNOSIS — E11.9 DIABETES MELLITUS TYPE 2 WITHOUT RETINOPATHY (HCC): ICD-10-CM

## 2021-03-16 DIAGNOSIS — I10 ESSENTIAL HYPERTENSION: ICD-10-CM

## 2021-03-16 DIAGNOSIS — E11.9 DIABETES MELLITUS TYPE 2 WITHOUT RETINOPATHY (HCC): Primary | ICD-10-CM

## 2021-03-16 DIAGNOSIS — M79.632 LEFT FOREARM PAIN: ICD-10-CM

## 2021-03-16 DIAGNOSIS — R68.84 JAW PAIN: ICD-10-CM

## 2021-03-16 LAB
ALBUMIN SERPL-MCNC: 4.1 G/DL (ref 3.4–5)
ALBUMIN/GLOB SERPL: 1.3 {RATIO} (ref 1–2)
ALP LIVER SERPL-CCNC: 70 U/L
ALT SERPL-CCNC: 17 U/L
ANION GAP SERPL CALC-SCNC: 9 MMOL/L (ref 0–18)
AST SERPL-CCNC: 13 U/L (ref 15–37)
BILIRUB SERPL-MCNC: 0.4 MG/DL (ref 0.1–2)
BUN BLD-MCNC: 7 MG/DL (ref 7–18)
BUN/CREAT SERPL: 8 (ref 10–20)
CALCIUM BLD-MCNC: 9.1 MG/DL (ref 8.5–10.1)
CHLORIDE SERPL-SCNC: 103 MMOL/L (ref 98–112)
CHOLEST SMN-MCNC: 121 MG/DL (ref ?–200)
CO2 SERPL-SCNC: 27 MMOL/L (ref 21–32)
CREAT BLD-MCNC: 0.87 MG/DL
CREAT UR-SCNC: 54.3 MG/DL
EST. AVERAGE GLUCOSE BLD GHB EST-MCNC: 151 MG/DL (ref 68–126)
GLOBULIN PLAS-MCNC: 3.2 G/DL (ref 2.8–4.4)
GLUCOSE BLD-MCNC: 126 MG/DL (ref 70–99)
HBA1C MFR BLD HPLC: 6.9 % (ref ?–5.7)
HDLC SERPL-MCNC: 42 MG/DL (ref 40–59)
LDLC SERPL CALC-MCNC: 59 MG/DL (ref ?–100)
M PROTEIN MFR SERPL ELPH: 7.3 G/DL (ref 6.4–8.2)
MICROALBUMIN UR-MCNC: 0.6 MG/DL
MICROALBUMIN/CREAT 24H UR-RTO: 11 UG/MG (ref ?–30)
NONHDLC SERPL-MCNC: 79 MG/DL (ref ?–130)
OSMOLALITY SERPL CALC.SUM OF ELEC: 288 MOSM/KG (ref 275–295)
PATIENT FASTING Y/N/NP: YES
PATIENT FASTING Y/N/NP: YES
POTASSIUM SERPL-SCNC: 4.2 MMOL/L (ref 3.5–5.1)
SODIUM SERPL-SCNC: 139 MMOL/L (ref 136–145)
TRIGL SERPL-MCNC: 98 MG/DL (ref 30–149)
VLDLC SERPL CALC-MCNC: 20 MG/DL (ref 0–30)

## 2021-03-16 PROCEDURE — 93005 ELECTROCARDIOGRAM TRACING: CPT

## 2021-03-16 PROCEDURE — 3078F DIAST BP <80 MM HG: CPT | Performed by: FAMILY MEDICINE

## 2021-03-16 PROCEDURE — 83036 HEMOGLOBIN GLYCOSYLATED A1C: CPT

## 2021-03-16 PROCEDURE — 3044F HG A1C LEVEL LT 7.0%: CPT | Performed by: FAMILY MEDICINE

## 2021-03-16 PROCEDURE — 3075F SYST BP GE 130 - 139MM HG: CPT | Performed by: FAMILY MEDICINE

## 2021-03-16 PROCEDURE — 3061F NEG MICROALBUMINURIA REV: CPT | Performed by: FAMILY MEDICINE

## 2021-03-16 PROCEDURE — 36415 COLL VENOUS BLD VENIPUNCTURE: CPT

## 2021-03-16 PROCEDURE — 99214 OFFICE O/P EST MOD 30 MIN: CPT | Performed by: FAMILY MEDICINE

## 2021-03-16 PROCEDURE — 93010 ELECTROCARDIOGRAM REPORT: CPT | Performed by: FAMILY MEDICINE

## 2021-03-16 PROCEDURE — 80053 COMPREHEN METABOLIC PANEL: CPT

## 2021-03-16 PROCEDURE — 82043 UR ALBUMIN QUANTITATIVE: CPT

## 2021-03-16 PROCEDURE — 80061 LIPID PANEL: CPT

## 2021-03-16 PROCEDURE — 82570 ASSAY OF URINE CREATININE: CPT

## 2021-03-16 PROCEDURE — 3008F BODY MASS INDEX DOCD: CPT | Performed by: FAMILY MEDICINE

## 2021-03-16 NOTE — TELEPHONE ENCOUNTER
Rx Number: [id number]  Drug: Diclofenac 1% Gel 100GM    Sig: Place 1 application onto the skin twice daily as needed  Prescribed Qty: 100  Last Refill: 10/13/2019

## 2021-03-16 NOTE — PROGRESS NOTES
HPI:    Patient ID: Mata Marrero is a 78year old male. HPI  Patient presents with:  Diabetes: follow up   Cholesterol: follow up     Patient here for follow-up. Patient states he walks 2 miles 7 days a week. He states he is doing well overall.   He stat Allergies:No Known Allergies   PHYSICAL EXAM:   Patient presents with:  Diabetes: follow up   Cholesterol: follow up      Physical Exam  Vitals reviewed. Constitutional:       Appearance: He is well-developed. Neck:      Thyroid: No thyromegaly. Visit:  Requested Prescriptions      No prescriptions requested or ordered in this encounter       Imaging & Referrals:  OPHTHALMOLOGY - INTERNAL  CARDIO - INTERNAL       TE#[id number]

## 2021-03-25 ENCOUNTER — OFFICE VISIT (OUTPATIENT)
Dept: FAMILY MEDICINE CLINIC | Facility: CLINIC | Age: 71
End: 2021-03-25
Payer: COMMERCIAL

## 2021-03-25 VITALS
SYSTOLIC BLOOD PRESSURE: 137 MMHG | HEIGHT: 64 IN | TEMPERATURE: 97 F | BODY MASS INDEX: 21.68 KG/M2 | DIASTOLIC BLOOD PRESSURE: 63 MMHG | HEART RATE: 94 BPM | WEIGHT: 127 LBS

## 2021-03-25 DIAGNOSIS — H90.A31 MIXED CONDUCTIVE AND SENSORINEURAL HEARING LOSS OF RIGHT EAR WITH RESTRICTED HEARING OF LEFT EAR: ICD-10-CM

## 2021-03-25 DIAGNOSIS — I25.10 CORONARY ARTERY CALCIFICATION SEEN ON CAT SCAN: ICD-10-CM

## 2021-03-25 DIAGNOSIS — R05.3 CHRONIC COUGH: ICD-10-CM

## 2021-03-25 DIAGNOSIS — J84.10 PULMONARY FIBROSIS (HCC): ICD-10-CM

## 2021-03-25 DIAGNOSIS — I45.10 RIGHT BUNDLE BRANCH BLOCK: ICD-10-CM

## 2021-03-25 DIAGNOSIS — I49.1 PAC (PREMATURE ATRIAL CONTRACTION): ICD-10-CM

## 2021-03-25 DIAGNOSIS — E78.00 HYPERCHOLESTEREMIA: ICD-10-CM

## 2021-03-25 DIAGNOSIS — J84.9 INTERSTITIAL LUNG DISEASE (HCC): ICD-10-CM

## 2021-03-25 DIAGNOSIS — K21.9 GASTROESOPHAGEAL REFLUX DISEASE WITHOUT ESOPHAGITIS: ICD-10-CM

## 2021-03-25 DIAGNOSIS — I10 ESSENTIAL HYPERTENSION: ICD-10-CM

## 2021-03-25 DIAGNOSIS — B02.22 POST-HERPETIC TRIGEMINAL NEURALGIA: ICD-10-CM

## 2021-03-25 DIAGNOSIS — K44.9 HIATAL HERNIA: ICD-10-CM

## 2021-03-25 DIAGNOSIS — E11.9 DIABETES MELLITUS TYPE 2 WITHOUT RETINOPATHY (HCC): ICD-10-CM

## 2021-03-25 DIAGNOSIS — R22.2 PLEURAL NODULES: ICD-10-CM

## 2021-03-25 DIAGNOSIS — Z00.00 ENCOUNTER FOR ANNUAL HEALTH EXAMINATION: Primary | ICD-10-CM

## 2021-03-25 PROBLEM — H25.13 NUCLEAR SCLEROTIC CATARACT OF BOTH EYES: Status: RESOLVED | Noted: 2018-04-02 | Resolved: 2021-03-25

## 2021-03-25 PROCEDURE — 3075F SYST BP GE 130 - 139MM HG: CPT | Performed by: FAMILY MEDICINE

## 2021-03-25 PROCEDURE — 99397 PER PM REEVAL EST PAT 65+ YR: CPT | Performed by: FAMILY MEDICINE

## 2021-03-25 PROCEDURE — 3008F BODY MASS INDEX DOCD: CPT | Performed by: FAMILY MEDICINE

## 2021-03-25 PROCEDURE — 3078F DIAST BP <80 MM HG: CPT | Performed by: FAMILY MEDICINE

## 2021-03-25 RX ORDER — BENZONATATE 200 MG/1
200 CAPSULE ORAL 3 TIMES DAILY PRN
Qty: 30 CAPSULE | Refills: 0 | Status: SHIPPED | OUTPATIENT
Start: 2021-03-25 | End: 2021-07-22 | Stop reason: ALTCHOICE

## 2021-03-25 NOTE — PROGRESS NOTES
HPI:   Mata Marrero is a 78year old male who presents for a Medicare Subsequent Annual Wellness visit (Pt already had Initial Annual Wellness).     Feels well other than pain left jaw sometimes when he wakes up in am  Has history of post herpetic trigemina screening   Annette Correa was screened for Alcohol abuse and had a score of 0 so is at low risk.      Patient Care Team: Patient Care Team:  Zoe Oswald MD as PCP - General (Family Medicine)  Mandeep Wilkins MD (Physical Medicine)    Patient Active Problem L breakfast.  aspirin 81 MG Oral Tab, Take 81 mg by mouth daily. MEDICAL INFORMATION:   He  has a past medical history of Acid reflux, Diabetes ([address] High cholesterol, and Hyperlipidemia.     He  has a past surgical history that includes colonoscopy (2 because I cannot hear well and fear I will reply improperly: No   Family members and friends have told me they think I may have hearing loss:  No                  Visual Acuity                           General Appearance:  Alert, cooperative, no distress, RELEVANT CHRONIC CONDITIONS:   Evon Jenkins is a 78year old male who presents for a Medicare Assessment.      PLAN SUMMARY:   Diagnoses and all orders for this visit:    Encounter for annual health examination    Hypercholesteremia    Diabetes mellitus type for the chest.  And follow-up with pulmonary after  - benzonatate 200 MG Oral Cap; Take 1 capsule (200 mg total) by mouth 3 (three) times daily as needed for cough. Dispense: 30 capsule; Refill: 0  - CT CHEST HI RESOLUTION (CPT=71250); Future    12.  Post- Cardiovascular Disease Screening     LDL Annually LDL Cholesterol (mg/dL)   Date Value   03/16/2021 59        EKG - w/ Initial Preventative Physical Exam only, or if medically necessary Electrocardiogram date03/16/2021    Colorectal Cancer Screening or Procedure          Diabetes      HgbA1C  Annually HgbA1C (%)   Date Value   03/16/2021 6.9 (H)       No flowsheet data found.     Creat/alb ratio  Annually Malb/Cre Calc (ug/mg)   Date Value   03/16/2021 11.0        LDL  Annually LDL Cholesterol (mg/dL)

## 2021-04-20 NOTE — TELEPHONE ENCOUNTER
Patient  requesting  refill . Gabapentin not on current medication list but patient states he was told to take medication by Dr. Dionna Valle if needed for post-herpetic trigeminal neuralgia.    Refill Protocol Appointment Criteria  · Appointment scheduled in the

## 2021-04-21 RX ORDER — OMEPRAZOLE 40 MG/1
40 CAPSULE, DELAYED RELEASE ORAL DAILY
Qty: 30 CAPSULE | Refills: 11 | OUTPATIENT
Start: 2021-04-21

## 2021-04-21 RX ORDER — GABAPENTIN 100 MG/1
100 CAPSULE ORAL 2 TIMES DAILY
Qty: 180 CAPSULE | Refills: 1 | Status: SHIPPED | OUTPATIENT
Start: 2021-04-21 | End: 2021-10-28

## 2021-04-28 DIAGNOSIS — E11.9 DIABETES MELLITUS TYPE 2 WITHOUT RETINOPATHY (HCC): ICD-10-CM

## 2021-05-05 DIAGNOSIS — E11.9 DIABETES MELLITUS TYPE 2 WITHOUT RETINOPATHY (HCC): ICD-10-CM

## 2021-05-18 ENCOUNTER — OFFICE VISIT (OUTPATIENT)
Dept: CARDIOLOGY CLINIC | Facility: CLINIC | Age: 71
End: 2021-05-18
Payer: COMMERCIAL

## 2021-05-18 VITALS
HEART RATE: 90 BPM | WEIGHT: 129 LBS | SYSTOLIC BLOOD PRESSURE: 138 MMHG | OXYGEN SATURATION: 98 % | HEIGHT: 64 IN | RESPIRATION RATE: 18 BRPM | DIASTOLIC BLOOD PRESSURE: 74 MMHG | BODY MASS INDEX: 22.02 KG/M2

## 2021-05-18 DIAGNOSIS — R94.31 ABNORMAL ECG: ICD-10-CM

## 2021-05-18 DIAGNOSIS — R93.89 ABNORMAL FINDINGS ON DIAGNOSTIC IMAGING OF CARDIOVASCULAR SYSTEM: Primary | ICD-10-CM

## 2021-05-18 PROCEDURE — 3075F SYST BP GE 130 - 139MM HG: CPT | Performed by: INTERNAL MEDICINE

## 2021-05-18 PROCEDURE — 99204 OFFICE O/P NEW MOD 45 MIN: CPT | Performed by: INTERNAL MEDICINE

## 2021-05-18 PROCEDURE — 3078F DIAST BP <80 MM HG: CPT | Performed by: INTERNAL MEDICINE

## 2021-05-18 PROCEDURE — 3008F BODY MASS INDEX DOCD: CPT | Performed by: INTERNAL MEDICINE

## 2021-05-18 NOTE — PATIENT INSTRUCTIONS
Schedule echocardiogram    Get heart scan and call with calcium score    Follow-up with pulmonary as recommended    Call if changes

## 2021-05-18 NOTE — PROGRESS NOTES
Sandra Cloud is a 78year old male. HPI:   This is a pleasant 78year old male with diabetes who now presents for cardiac assessment.   Patient is unsure why he was sent here but did have an EKG showing sinus with r Past Medical History:   Diagnosis Date   • Acid reflux    • Diabetes ([facility]    • High cholesterol    • Hyperlipidemia       Social History:  Social History    Tobacco Use      Smoking status: Never Smoker      Smokeless tobacco: Never Used    Alcohol u unless new symptoms.  - CARD ECHO 2D DOPPLER (CPT=93306); Future      The patient indicates understanding of these issues and agrees to the plan. Follow Up: Return in about 1 year (around 5/18/2022).            Arian Jones MD  5/18/2021  11:35 AM

## 2021-07-22 ENCOUNTER — TELEPHONE (OUTPATIENT)
Dept: FAMILY MEDICINE CLINIC | Facility: CLINIC | Age: 71
End: 2021-07-22

## 2021-07-22 ENCOUNTER — OFFICE VISIT (OUTPATIENT)
Dept: FAMILY MEDICINE CLINIC | Facility: CLINIC | Age: 71
End: 2021-07-22
Payer: COMMERCIAL

## 2021-07-22 ENCOUNTER — TELEPHONE (OUTPATIENT)
Dept: PHYSICAL THERAPY | Facility: HOSPITAL | Age: 71
End: 2021-07-22

## 2021-07-22 ENCOUNTER — LAB ENCOUNTER (OUTPATIENT)
Dept: LAB | Age: 71
End: 2021-07-22
Attending: FAMILY MEDICINE
Payer: MEDICARE

## 2021-07-22 VITALS
BODY MASS INDEX: 21.51 KG/M2 | DIASTOLIC BLOOD PRESSURE: 68 MMHG | HEIGHT: 64 IN | SYSTOLIC BLOOD PRESSURE: 124 MMHG | HEART RATE: 70 BPM | RESPIRATION RATE: 16 BRPM | OXYGEN SATURATION: 98 % | WEIGHT: 126 LBS

## 2021-07-22 DIAGNOSIS — E11.9 DIABETES MELLITUS TYPE 2 WITHOUT RETINOPATHY (HCC): ICD-10-CM

## 2021-07-22 DIAGNOSIS — J84.10 PULMONARY FIBROSIS (HCC): ICD-10-CM

## 2021-07-22 DIAGNOSIS — M77.11 LATERAL EPICONDYLITIS OF BOTH ELBOWS: Primary | ICD-10-CM

## 2021-07-22 DIAGNOSIS — R05.3 CHRONIC COUGH: ICD-10-CM

## 2021-07-22 DIAGNOSIS — R25.2 LEG CRAMPING: ICD-10-CM

## 2021-07-22 DIAGNOSIS — M77.12 LATERAL EPICONDYLITIS OF BOTH ELBOWS: Primary | ICD-10-CM

## 2021-07-22 DIAGNOSIS — M54.50 ACUTE BILATERAL LOW BACK PAIN WITHOUT SCIATICA: ICD-10-CM

## 2021-07-22 LAB
ALBUMIN SERPL-MCNC: 3.9 G/DL (ref 3.4–5)
ALBUMIN/GLOB SERPL: 1.1 {RATIO} (ref 1–2)
ALP LIVER SERPL-CCNC: 67 U/L
ALT SERPL-CCNC: 24 U/L
ANION GAP SERPL CALC-SCNC: 7 MMOL/L (ref 0–18)
AST SERPL-CCNC: 16 U/L (ref 15–37)
BILIRUB SERPL-MCNC: 0.5 MG/DL (ref 0.1–2)
BUN BLD-MCNC: 6 MG/DL (ref 7–18)
BUN/CREAT SERPL: 7.5 (ref 10–20)
CALCIUM BLD-MCNC: 9.4 MG/DL (ref 8.5–10.1)
CHLORIDE SERPL-SCNC: 105 MMOL/L (ref 98–112)
CO2 SERPL-SCNC: 26 MMOL/L (ref 21–32)
CREAT BLD-MCNC: 0.8 MG/DL
EST. AVERAGE GLUCOSE BLD GHB EST-MCNC: 154 MG/DL (ref 68–126)
GLOBULIN PLAS-MCNC: 3.4 G/DL (ref 2.8–4.4)
GLUCOSE BLD-MCNC: 136 MG/DL (ref 70–99)
HBA1C MFR BLD HPLC: 7 % (ref ?–5.7)
M PROTEIN MFR SERPL ELPH: 7.3 G/DL (ref 6.4–8.2)
OSMOLALITY SERPL CALC.SUM OF ELEC: 286 MOSM/KG (ref 275–295)
PATIENT FASTING Y/N/NP: YES
POTASSIUM SERPL-SCNC: 4.5 MMOL/L (ref 3.5–5.1)
SODIUM SERPL-SCNC: 138 MMOL/L (ref 136–145)
VIT B12 SERPL-MCNC: 168 PG/ML (ref 193–986)

## 2021-07-22 PROCEDURE — 3078F DIAST BP <80 MM HG: CPT | Performed by: FAMILY MEDICINE

## 2021-07-22 PROCEDURE — 83036 HEMOGLOBIN GLYCOSYLATED A1C: CPT

## 2021-07-22 PROCEDURE — 99214 OFFICE O/P EST MOD 30 MIN: CPT | Performed by: FAMILY MEDICINE

## 2021-07-22 PROCEDURE — 3008F BODY MASS INDEX DOCD: CPT | Performed by: FAMILY MEDICINE

## 2021-07-22 PROCEDURE — 3051F HG A1C>EQUAL 7.0%<8.0%: CPT | Performed by: FAMILY MEDICINE

## 2021-07-22 PROCEDURE — 36415 COLL VENOUS BLD VENIPUNCTURE: CPT

## 2021-07-22 PROCEDURE — 80053 COMPREHEN METABOLIC PANEL: CPT

## 2021-07-22 PROCEDURE — 3074F SYST BP LT 130 MM HG: CPT | Performed by: FAMILY MEDICINE

## 2021-07-22 PROCEDURE — 82607 VITAMIN B-12: CPT

## 2021-07-22 NOTE — PROGRESS NOTES
b HPI:    Patient ID: Bal Swan is a 78year old male.     HPI  Patient presents with:  Pain: Patient present with pain in left arm - from elbow to wrist x 1 month - used Bengay    Patient with complains of having pain on both his elbows  This is been ha topically 2 (two) times daily as needed. 100 g 1   • atorvastatin 20 MG Oral Tab Take 1 tablet (20 mg total) by mouth nightly. 90 tablet 1   • Omeprazole 40 MG Oral Capsule Delayed Release Take 1 capsule (40 mg total) by mouth daily.  Before breakfast. 30 c gabapentin up to 2 tablets 3 times a day gradually. Check labs  - COMP METABOLIC PANEL (14); Future  - VITAMIN B12; Future    3. Diabetes mellitus type 2 without retinopathy ([facility]  Check labs  - COMP METABOLIC PANEL (14);  Future  - HEMOGLOBIN A1C; Future

## 2021-07-22 NOTE — PATIENT INSTRUCTIONS
Understanding Lateral Epicondylitis     Tendons are strong bands of tissue that connect muscles to bones. Lateral epicondylitis affects the tendons that connect muscles in the forearm to the lateral epicondyle.  This is the bony knob on the outer side of over-the-counter pain medicines may help reduce pain and swelling.    · Wearing a brace. This helps reduce strain on the muscles and tendons in the forearm, which may relieve symptoms. It's very important to wear the brace properly.   · Doing exercises and legs before bedtime. · Eat a diet high in potassium. These foods include fresh fruit, such as bananas, oranges, cantaloupe, and honeydew melon. It also includes apple, prune, orange, grape and pineapple juices.  Other foods high in potassium are white, red Marian last reviewed this educational content on 5/1/2018  © 4071-9909 [employer]. All rights reserved. This information is not intended as a substitute for professional medical care.  Always follow your healthcare professional's instructio

## 2021-07-28 ENCOUNTER — OFFICE VISIT (OUTPATIENT)
Dept: PHYSICAL THERAPY | Age: 71
End: 2021-07-28
Attending: FAMILY MEDICINE
Payer: MEDICARE

## 2021-07-28 DIAGNOSIS — M77.11 LATERAL EPICONDYLITIS OF BOTH ELBOWS: ICD-10-CM

## 2021-07-28 DIAGNOSIS — M77.12 LATERAL EPICONDYLITIS OF BOTH ELBOWS: ICD-10-CM

## 2021-07-28 DIAGNOSIS — M54.50 ACUTE BILATERAL LOW BACK PAIN WITHOUT SCIATICA: ICD-10-CM

## 2021-07-28 PROCEDURE — 97110 THERAPEUTIC EXERCISES: CPT | Performed by: PHYSICAL THERAPIST

## 2021-07-28 PROCEDURE — 97162 PT EVAL MOD COMPLEX 30 MIN: CPT | Performed by: PHYSICAL THERAPIST

## 2021-07-28 NOTE — PROGRESS NOTES
ELBOW AND HAND EVALUATION:   Referring Physician: Dr. Joan Jon  Diagnosis: Bilat epicondylitis, LBP    Date of Service: 7/28/2021     PATIENT SUMMARY   Dagoberto Abreu is a 78year old male who presents to therapy today with complaints of pain 1 month ago at b impairments and reach functional goals. Precautions:  None  OBJECTIVE:   Observation/Posture: poor sitting posture.  Thoracic flexion, frwd head/shoulders  Palpation: tenderness L lateral epicondyle/extensors   Edema: none  Sensation: intact  Cervical S sitting posture to reduce strain at back and UE's. 4. Patient to report 50% less pain at lower back. Frequency / Duration: Patient will be seen for 2 x/week or a total of 8-12 visits over a 90 day period.  Treatment will include: Manual Therapy, Therap

## 2021-07-30 DIAGNOSIS — E53.8 VITAMIN B12 DEFICIENCY: Primary | ICD-10-CM

## 2021-07-30 RX ORDER — CYANOCOBALAMIN 1000 UG/ML
1000 INJECTION INTRAMUSCULAR; SUBCUTANEOUS
Status: SHIPPED | OUTPATIENT
Start: 2021-08-02 | End: 2021-10-31

## 2021-07-31 RX ORDER — ATORVASTATIN CALCIUM 20 MG/1
TABLET, FILM COATED ORAL
Qty: 90 TABLET | Refills: 1 | Status: SHIPPED | OUTPATIENT
Start: 2021-07-31 | End: 2021-10-26

## 2021-08-02 ENCOUNTER — TELEPHONE (OUTPATIENT)
Dept: FAMILY MEDICINE CLINIC | Facility: CLINIC | Age: 71
End: 2021-08-02

## 2021-08-02 NOTE — TELEPHONE ENCOUNTER
Pt advised of Dr Yvonne García note, first injections scheduled 8/4.     \"Vitamin B12 is quite low and I would recommend taking vitamin B12 injections.  I will order these and you may schedule a nurse visit to get this done.  I would recommend monthly injections

## 2021-08-02 NOTE — TELEPHONE ENCOUNTER
Per patient he received his result and it says that he needs a B12 shot but can not see the Order. Please advise.

## 2021-08-04 ENCOUNTER — NURSE ONLY (OUTPATIENT)
Dept: FAMILY MEDICINE CLINIC | Facility: CLINIC | Age: 71
End: 2021-08-04
Payer: COMMERCIAL

## 2021-08-04 DIAGNOSIS — E53.8 LOW VITAMIN B12 LEVEL: Primary | ICD-10-CM

## 2021-08-04 PROBLEM — R79.89 LOW VITAMIN B12 LEVEL: Status: ACTIVE | Noted: 2021-08-04

## 2021-08-04 PROCEDURE — 96372 THER/PROPH/DIAG INJ SC/IM: CPT | Performed by: FAMILY MEDICINE

## 2021-08-04 RX ADMIN — CYANOCOBALAMIN 1000 MCG: 1000 INJECTION INTRAMUSCULAR; SUBCUTANEOUS at 09:13:00

## 2021-08-06 ENCOUNTER — APPOINTMENT (OUTPATIENT)
Dept: PHYSICAL THERAPY | Age: 71
End: 2021-08-06
Attending: FAMILY MEDICINE
Payer: MEDICARE

## 2021-08-18 ENCOUNTER — APPOINTMENT (OUTPATIENT)
Dept: PHYSICAL THERAPY | Age: 71
End: 2021-08-18
Attending: FAMILY MEDICINE
Payer: MEDICARE

## 2021-08-20 ENCOUNTER — APPOINTMENT (OUTPATIENT)
Dept: PHYSICAL THERAPY | Age: 71
End: 2021-08-20
Attending: FAMILY MEDICINE
Payer: MEDICARE

## 2021-08-23 ENCOUNTER — OFFICE VISIT (OUTPATIENT)
Dept: SURGERY | Facility: CLINIC | Age: 71
End: 2021-08-23
Payer: COMMERCIAL

## 2021-08-23 ENCOUNTER — LAB ENCOUNTER (OUTPATIENT)
Dept: LAB | Age: 71
End: 2021-08-23
Attending: UROLOGY
Payer: MEDICARE

## 2021-08-23 VITALS
SYSTOLIC BLOOD PRESSURE: 120 MMHG | HEIGHT: 64 IN | WEIGHT: 125 LBS | RESPIRATION RATE: 16 BRPM | DIASTOLIC BLOOD PRESSURE: 77 MMHG | BODY MASS INDEX: 21.34 KG/M2 | HEART RATE: 92 BPM

## 2021-08-23 DIAGNOSIS — R39.15 URINARY URGENCY: Primary | ICD-10-CM

## 2021-08-23 DIAGNOSIS — R39.15 URINARY URGENCY: ICD-10-CM

## 2021-08-23 LAB
BILIRUB UR QL: NEGATIVE
CLARITY UR: CLEAR
COLOR UR: YELLOW
GLUCOSE UR-MCNC: NEGATIVE MG/DL
HGB UR QL STRIP.AUTO: NEGATIVE
KETONES UR-MCNC: NEGATIVE MG/DL
LEUKOCYTE ESTERASE UR QL STRIP.AUTO: NEGATIVE
NITRITE UR QL STRIP.AUTO: NEGATIVE
PH UR: 6 [PH] (ref 5–8)
PROT UR-MCNC: NEGATIVE MG/DL
SP GR UR STRIP: 1.01 (ref 1–1.03)
UROBILINOGEN UR STRIP-ACNC: <2

## 2021-08-23 PROCEDURE — 3078F DIAST BP <80 MM HG: CPT | Performed by: UROLOGY

## 2021-08-23 PROCEDURE — 99204 OFFICE O/P NEW MOD 45 MIN: CPT | Performed by: UROLOGY

## 2021-08-23 PROCEDURE — 3008F BODY MASS INDEX DOCD: CPT | Performed by: UROLOGY

## 2021-08-23 PROCEDURE — 3074F SYST BP LT 130 MM HG: CPT | Performed by: UROLOGY

## 2021-08-23 PROCEDURE — 81003 URINALYSIS AUTO W/O SCOPE: CPT

## 2021-08-24 ENCOUNTER — TELEPHONE (OUTPATIENT)
Dept: FAMILY MEDICINE CLINIC | Facility: CLINIC | Age: 71
End: 2021-08-24

## 2021-08-24 ENCOUNTER — APPOINTMENT (OUTPATIENT)
Dept: PHYSICAL THERAPY | Age: 71
End: 2021-08-24
Attending: FAMILY MEDICINE
Payer: MEDICARE

## 2021-08-24 NOTE — TELEPHONE ENCOUNTER
Advised patient that due for B-12 injections every month. Next due on 9/1/2021. Patient verbalized understanding and will call back to schedule a nurse visit.        Provider Information    Authorizing Provider Attending Providers Ordering Provider   Suzie

## 2021-08-27 ENCOUNTER — APPOINTMENT (OUTPATIENT)
Dept: PHYSICAL THERAPY | Age: 71
End: 2021-08-27
Attending: FAMILY MEDICINE
Payer: MEDICARE

## 2021-08-31 ENCOUNTER — APPOINTMENT (OUTPATIENT)
Dept: PHYSICAL THERAPY | Age: 71
End: 2021-08-31
Attending: FAMILY MEDICINE
Payer: MEDICARE

## 2021-09-03 ENCOUNTER — APPOINTMENT (OUTPATIENT)
Dept: PHYSICAL THERAPY | Age: 71
End: 2021-09-03
Attending: FAMILY MEDICINE
Payer: MEDICARE

## 2021-09-10 ENCOUNTER — TELEPHONE (OUTPATIENT)
Dept: FAMILY MEDICINE CLINIC | Facility: CLINIC | Age: 71
End: 2021-09-10

## 2021-09-10 DIAGNOSIS — E53.8 VITAMIN B12 DEFICIENCY: Primary | ICD-10-CM

## 2021-09-10 NOTE — TELEPHONE ENCOUNTER
Patient calling to schedule nurse visit for vitamin B-12 injection. Patient scheduled on 9/16/21.      Dr. Teresa Walker:   Please see pended order for injection and sign

## 2021-09-11 RX ORDER — CYANOCOBALAMIN 1000 UG/ML
1000 INJECTION INTRAMUSCULAR; SUBCUTANEOUS
Status: SHIPPED | OUTPATIENT
Start: 2021-09-16 | End: 2022-03-15

## 2021-09-11 NOTE — TELEPHONE ENCOUNTER
B12 injection order placed.   Patient also due for his B12 levels to be rechecked once he has received 2 doses of the injections

## 2021-09-20 ENCOUNTER — NURSE ONLY (OUTPATIENT)
Dept: FAMILY MEDICINE CLINIC | Facility: CLINIC | Age: 71
End: 2021-09-20
Payer: COMMERCIAL

## 2021-09-20 DIAGNOSIS — E53.8 VITAMIN B12 DEFICIENCY: Primary | ICD-10-CM

## 2021-09-20 PROCEDURE — 96372 THER/PROPH/DIAG INJ SC/IM: CPT | Performed by: FAMILY MEDICINE

## 2021-09-20 RX ORDER — ATORVASTATIN CALCIUM 20 MG/1
TABLET, FILM COATED ORAL
Qty: 90 TABLET | Refills: 3 | OUTPATIENT
Start: 2021-09-20

## 2021-09-20 RX ADMIN — CYANOCOBALAMIN 1000 MCG: 1000 INJECTION INTRAMUSCULAR; SUBCUTANEOUS at 10:13:00

## 2021-10-26 ENCOUNTER — OFFICE VISIT (OUTPATIENT)
Dept: FAMILY MEDICINE CLINIC | Facility: CLINIC | Age: 71
End: 2021-10-26
Payer: COMMERCIAL

## 2021-10-26 VITALS
SYSTOLIC BLOOD PRESSURE: 122 MMHG | WEIGHT: 124 LBS | HEIGHT: 64 IN | BODY MASS INDEX: 21.17 KG/M2 | HEART RATE: 79 BPM | TEMPERATURE: 98 F | DIASTOLIC BLOOD PRESSURE: 69 MMHG

## 2021-10-26 DIAGNOSIS — E78.00 HYPERCHOLESTEREMIA: ICD-10-CM

## 2021-10-26 DIAGNOSIS — R05.3 CHRONIC COUGH: Primary | ICD-10-CM

## 2021-10-26 DIAGNOSIS — G89.29 CHRONIC BILATERAL LOW BACK PAIN WITHOUT SCIATICA: ICD-10-CM

## 2021-10-26 DIAGNOSIS — E53.8 VITAMIN B12 DEFICIENCY: ICD-10-CM

## 2021-10-26 DIAGNOSIS — M54.50 CHRONIC BILATERAL LOW BACK PAIN WITHOUT SCIATICA: ICD-10-CM

## 2021-10-26 DIAGNOSIS — E11.9 DIABETES MELLITUS TYPE 2 WITHOUT RETINOPATHY (HCC): ICD-10-CM

## 2021-10-26 PROCEDURE — 3078F DIAST BP <80 MM HG: CPT | Performed by: FAMILY MEDICINE

## 2021-10-26 PROCEDURE — 3008F BODY MASS INDEX DOCD: CPT | Performed by: FAMILY MEDICINE

## 2021-10-26 PROCEDURE — 99214 OFFICE O/P EST MOD 30 MIN: CPT | Performed by: FAMILY MEDICINE

## 2021-10-26 PROCEDURE — 3074F SYST BP LT 130 MM HG: CPT | Performed by: FAMILY MEDICINE

## 2021-10-26 RX ORDER — ATORVASTATIN CALCIUM 20 MG/1
20 TABLET, FILM COATED ORAL EVERY EVENING
Qty: 90 TABLET | Refills: 1 | Status: SHIPPED | OUTPATIENT
Start: 2021-10-26 | End: 2022-02-01

## 2021-10-26 RX ORDER — METHOCARBAMOL 500 MG/1
500 TABLET, FILM COATED ORAL NIGHTLY PRN
Qty: 30 TABLET | Refills: 0 | Status: SHIPPED | OUTPATIENT
Start: 2021-10-26 | End: 2021-10-26

## 2021-10-26 RX ORDER — METHOCARBAMOL 500 MG/1
500 TABLET, FILM COATED ORAL NIGHTLY PRN
Qty: 30 TABLET | Refills: 0 | Status: SHIPPED | OUTPATIENT
Start: 2021-10-26 | End: 2021-10-27

## 2021-10-26 NOTE — PROGRESS NOTES
HPI:    Patient ID: Trinity Kirk is a 73year old male.     HPI  Patient presents with:  Low Back Pain: X 10 ten days   Cough: during the mornings X 10 days     Wt Readings from Last 6 Encounters:  10/26/21 : 124 lb (56.2 kg)  08/23/21 : 125 lb (56.7 kg)  0 Oral Tab Take 1 tablet (500 mg total) by mouth nightly as needed. 30 tablet 0   • gabapentin 100 MG Oral Cap Take 1 capsule (100 mg total) by mouth 2 (two) times daily.  180 capsule 1   • Diclofenac Sodium (QC DICLOFENAC SODIUM) 1 % External Gel Apply 2 g t needed. Dispense: 30 tablet; Refill: 0    3. Diabetes mellitus type 2 without retinopathy ([facility]    - metFORMIN HCl 1000 MG Oral Tab; Take 1 tablet (1,000 mg total) by mouth 2 (two) times daily with meals. Dispense: 180 tablet;  Refill: 1  - HEMOGLOBIN A1C

## 2021-10-27 ENCOUNTER — TELEPHONE (OUTPATIENT)
Dept: FAMILY MEDICINE CLINIC | Facility: CLINIC | Age: 71
End: 2021-10-27

## 2021-10-27 DIAGNOSIS — M54.50 CHRONIC BILATERAL LOW BACK PAIN WITHOUT SCIATICA: ICD-10-CM

## 2021-10-27 DIAGNOSIS — G89.29 CHRONIC BILATERAL LOW BACK PAIN WITHOUT SCIATICA: ICD-10-CM

## 2021-10-27 RX ORDER — METHOCARBAMOL 500 MG/1
500 TABLET, FILM COATED ORAL NIGHTLY PRN
Qty: 30 TABLET | Refills: 0 | Status: SHIPPED | OUTPATIENT
Start: 2021-10-27

## 2021-10-27 NOTE — TELEPHONE ENCOUNTER
Patient wants prescription to be sent to [facility] [address] [facility] [address]. It was sent to [facility] on 10/26/21, he no longer goes there.        Re faxed prescription to [facility]

## 2021-10-28 RX ORDER — GABAPENTIN 100 MG/1
100 CAPSULE ORAL 2 TIMES DAILY
Qty: 180 CAPSULE | Refills: 1 | Status: SHIPPED | OUTPATIENT
Start: 2021-10-28 | End: 2022-02-23

## 2021-10-28 NOTE — TELEPHONE ENCOUNTER
Refill passed per [address] protocol. Requested Prescriptions   Pending Prescriptions Disp Refills    gabapentin 100 MG Oral Cap 180 capsule 1     Sig: Take 1 capsule (100 mg total) by mouth 2 (two) times daily. Neurology Medications Passed - 10/28/2021  3:51 PM        Passed - Appointment in the past 6 or next 3 months              Future Appointments         Provider Department Appt Notes    In 1 month Urban Wan MD [address] f/u    In 4 months Sherri Sánchez MD Ophthalmology - [facility] Return in about 1 year (around 3/23/2022).             Recent Outpatient Visits              2 days ago Chronic cough    [address], [place] Greg Simons, MD    Office Visit    1 month ago Vitamin B12 deficiency    [address], [place]    Nurse Only    2 months ago Urinary urgency    [address] [facility] Andi Morejon MD    Office Visit    2 months ago Low vitamin B12 level    [address] [place]    Nurse Only    3 months ago Acute bilateral low back pain without sciatica    [facility] in [place], [place]    Office Visit

## 2021-11-05 ENCOUNTER — TELEPHONE (OUTPATIENT)
Dept: FAMILY MEDICINE CLINIC | Facility: CLINIC | Age: 71
End: 2021-11-05

## 2021-11-05 NOTE — TELEPHONE ENCOUNTER
Received fax for a new RX request thru [facility] home delivery       Omeprazole 40 MG Oral Capsule Delayed Release, Take 1 capsule (40 mg total) by mouth daily.  Before breakfast., Disp: 30 capsule, Rfl: 11

## 2021-11-08 RX ORDER — OMEPRAZOLE 40 MG/1
40 CAPSULE, DELAYED RELEASE ORAL DAILY
Qty: 90 CAPSULE | Refills: 3 | Status: SHIPPED | OUTPATIENT
Start: 2021-11-08 | End: 2022-11-03

## 2021-11-08 NOTE — TELEPHONE ENCOUNTER
Dr. Abdirahman Hanks--    Pharmacy mail service is requesting refill for omeprazole 40 mg/daily. Please review and sign pended orders if appropriate.      LOV: 07/26/2019  LR: 02/04/2021  Future Appts: None     Omeprazole 40 MG Oral Capsule Delayed Release 30 caps

## 2021-11-15 ENCOUNTER — TELEPHONE (OUTPATIENT)
Dept: GASTROENTEROLOGY | Facility: CLINIC | Age: 71
End: 2021-11-15

## 2021-11-15 NOTE — TELEPHONE ENCOUNTER
The patient asking about the Omeprazole. I stated Dr. Mayra Cole filled the medication. The call was transferred to Dr. Jenny Shukla office.

## 2021-11-15 NOTE — TELEPHONE ENCOUNTER
Omeprazole was sent to patient's Mail order pharmacy ([facility]) on 11/8/2021. I called the patient to inform. He told me that he called [facility] and was told that our office had to confirm the prescription.     I called [facility] at [phone] and spoke

## 2021-12-23 ENCOUNTER — LAB ENCOUNTER (OUTPATIENT)
Dept: LAB | Age: 71
End: 2021-12-23
Attending: FAMILY MEDICINE
Payer: MEDICARE

## 2021-12-23 ENCOUNTER — OFFICE VISIT (OUTPATIENT)
Dept: FAMILY MEDICINE CLINIC | Facility: CLINIC | Age: 71
End: 2021-12-23
Payer: COMMERCIAL

## 2021-12-23 VITALS
DIASTOLIC BLOOD PRESSURE: 66 MMHG | HEIGHT: 64 IN | WEIGHT: 117 LBS | BODY MASS INDEX: 19.97 KG/M2 | SYSTOLIC BLOOD PRESSURE: 136 MMHG | HEART RATE: 84 BPM

## 2021-12-23 DIAGNOSIS — M54.50 CHRONIC BILATERAL LOW BACK PAIN WITHOUT SCIATICA: Primary | ICD-10-CM

## 2021-12-23 DIAGNOSIS — R91.1 PULMONARY NODULE SEEN ON IMAGING STUDY: ICD-10-CM

## 2021-12-23 DIAGNOSIS — R63.0 LACK OF APPETITE: ICD-10-CM

## 2021-12-23 DIAGNOSIS — E53.8 VITAMIN B12 DEFICIENCY: ICD-10-CM

## 2021-12-23 DIAGNOSIS — Z12.5 PROSTATE CANCER SCREENING: ICD-10-CM

## 2021-12-23 DIAGNOSIS — R63.4 WEIGHT LOSS, UNINTENTIONAL: ICD-10-CM

## 2021-12-23 DIAGNOSIS — E78.00 HYPERCHOLESTEREMIA: ICD-10-CM

## 2021-12-23 DIAGNOSIS — E11.9 DIABETES MELLITUS TYPE 2 WITHOUT RETINOPATHY (HCC): ICD-10-CM

## 2021-12-23 DIAGNOSIS — G89.29 CHRONIC BILATERAL LOW BACK PAIN WITHOUT SCIATICA: Primary | ICD-10-CM

## 2021-12-23 DIAGNOSIS — J84.10 PULMONARY FIBROSIS (HCC): ICD-10-CM

## 2021-12-23 PROCEDURE — 84443 ASSAY THYROID STIM HORMONE: CPT

## 2021-12-23 PROCEDURE — 83921 ORGANIC ACID SINGLE QUANT: CPT

## 2021-12-23 PROCEDURE — 83036 HEMOGLOBIN GLYCOSYLATED A1C: CPT

## 2021-12-23 PROCEDURE — 84466 ASSAY OF TRANSFERRIN: CPT

## 2021-12-23 PROCEDURE — 80053 COMPREHEN METABOLIC PANEL: CPT

## 2021-12-23 PROCEDURE — 3008F BODY MASS INDEX DOCD: CPT | Performed by: FAMILY MEDICINE

## 2021-12-23 PROCEDURE — 3075F SYST BP GE 130 - 139MM HG: CPT | Performed by: FAMILY MEDICINE

## 2021-12-23 PROCEDURE — 36415 COLL VENOUS BLD VENIPUNCTURE: CPT

## 2021-12-23 PROCEDURE — 85025 COMPLETE CBC W/AUTO DIFF WBC: CPT

## 2021-12-23 PROCEDURE — 80061 LIPID PANEL: CPT

## 2021-12-23 PROCEDURE — 83540 ASSAY OF IRON: CPT

## 2021-12-23 PROCEDURE — 82607 VITAMIN B-12: CPT

## 2021-12-23 PROCEDURE — 3078F DIAST BP <80 MM HG: CPT | Performed by: FAMILY MEDICINE

## 2021-12-23 PROCEDURE — 99214 OFFICE O/P EST MOD 30 MIN: CPT | Performed by: FAMILY MEDICINE

## 2021-12-23 NOTE — PROGRESS NOTES
HPI:    Patient ID: Leila Michael is a 73year old male. HPI  Patient presents with:   Follow - Up  Blood Pressure  Diabetes  Back Pain: improved but still having pain     BP Readings from Last 3 Encounters:  12/23/21 : 136/66  10/26/21 : 122/69  08/23/21 aspirin 81 MG Oral Tab Take 81 mg by mouth daily. • gabapentin 100 MG Oral Cap Take 1 capsule (100 mg total) by mouth 2 (two) times daily.  (Patient not taking: Reported on 12/23/2021) 180 capsule 1   • methocarbamol 500 MG Oral Tab Take 1 tablet (500 m deficiency  Check levels    5. Pulmonary fibrosis (HCC)    - CT CHEST(CONTRAST ONLY) (CPT=71260); Future  - PULMONARY - INTERNAL    6. Pulmonary nodule seen on imaging study    - CT CHEST(CONTRAST ONLY) (CPT=71260); Future  - PULMONARY - INTERNAL    7.  Lac

## 2021-12-29 ENCOUNTER — HOSPITAL ENCOUNTER (OUTPATIENT)
Dept: GENERAL RADIOLOGY | Age: 71
Discharge: HOME | End: 2021-12-29
Attending: FAMILY MEDICINE
Payer: MEDICARE

## 2021-12-29 ENCOUNTER — HOSPITAL ENCOUNTER (OUTPATIENT)
Dept: CT IMAGING | Age: 71
Discharge: HOME | End: 2021-12-29
Attending: FAMILY MEDICINE
Payer: MEDICARE

## 2021-12-29 DIAGNOSIS — R91.1 PULMONARY NODULE SEEN ON IMAGING STUDY: ICD-10-CM

## 2021-12-29 DIAGNOSIS — J84.10 PULMONARY FIBROSIS (HCC): ICD-10-CM

## 2021-12-29 DIAGNOSIS — M54.50 CHRONIC BILATERAL LOW BACK PAIN WITHOUT SCIATICA: ICD-10-CM

## 2021-12-29 DIAGNOSIS — G89.29 CHRONIC BILATERAL LOW BACK PAIN WITHOUT SCIATICA: ICD-10-CM

## 2022-01-01 DIAGNOSIS — D50.9 IRON DEFICIENCY ANEMIA, UNSPECIFIED IRON DEFICIENCY ANEMIA TYPE: ICD-10-CM

## 2022-01-01 DIAGNOSIS — R63.0 LACK OF APPETITE: Primary | ICD-10-CM

## 2022-01-01 DIAGNOSIS — R63.4 WEIGHT LOSS, UNINTENTIONAL: ICD-10-CM

## 2022-01-26 ENCOUNTER — TELEPHONE (OUTPATIENT)
Dept: GASTROENTEROLOGY | Facility: CLINIC | Age: 72
End: 2022-01-26

## 2022-01-26 ENCOUNTER — OFFICE VISIT (OUTPATIENT)
Dept: FAMILY MEDICINE CLINIC | Facility: CLINIC | Age: 72
End: 2022-01-26
Payer: MEDICARE

## 2022-01-26 VITALS
HEIGHT: 64 IN | DIASTOLIC BLOOD PRESSURE: 72 MMHG | WEIGHT: 116 LBS | HEART RATE: 80 BPM | BODY MASS INDEX: 19.81 KG/M2 | TEMPERATURE: 98 F | SYSTOLIC BLOOD PRESSURE: 144 MMHG

## 2022-01-26 DIAGNOSIS — G89.29 CHRONIC BILATERAL LOW BACK PAIN WITHOUT SCIATICA: Primary | ICD-10-CM

## 2022-01-26 DIAGNOSIS — R63.4 WEIGHT LOSS, UNINTENTIONAL: ICD-10-CM

## 2022-01-26 DIAGNOSIS — M77.12 LATERAL EPICONDYLITIS OF BOTH ELBOWS: ICD-10-CM

## 2022-01-26 DIAGNOSIS — M54.50 CHRONIC BILATERAL LOW BACK PAIN WITHOUT SCIATICA: Primary | ICD-10-CM

## 2022-01-26 DIAGNOSIS — R63.0 LACK OF APPETITE: ICD-10-CM

## 2022-01-26 DIAGNOSIS — E53.8 VITAMIN B12 DEFICIENCY: ICD-10-CM

## 2022-01-26 DIAGNOSIS — M77.12 LEFT LATERAL EPICONDYLITIS: ICD-10-CM

## 2022-01-26 DIAGNOSIS — H26.9 CATARACT, UNSPECIFIED CATARACT TYPE, UNSPECIFIED LATERALITY: ICD-10-CM

## 2022-01-26 DIAGNOSIS — D50.9 IRON DEFICIENCY ANEMIA, UNSPECIFIED IRON DEFICIENCY ANEMIA TYPE: ICD-10-CM

## 2022-01-26 DIAGNOSIS — M77.11 LATERAL EPICONDYLITIS OF BOTH ELBOWS: ICD-10-CM

## 2022-01-26 PROCEDURE — 99214 OFFICE O/P EST MOD 30 MIN: CPT | Performed by: FAMILY MEDICINE

## 2022-01-26 PROCEDURE — 3008F BODY MASS INDEX DOCD: CPT | Performed by: FAMILY MEDICINE

## 2022-01-26 PROCEDURE — 3077F SYST BP >= 140 MM HG: CPT | Performed by: FAMILY MEDICINE

## 2022-01-26 PROCEDURE — 3078F DIAST BP <80 MM HG: CPT | Performed by: FAMILY MEDICINE

## 2022-01-26 NOTE — TELEPHONE ENCOUNTER
Kylah Wells MD  P  Gi Clinical Staff  Hello there   This patient concerns me, iron def anemia, lack of appetite, unintentional weight loss, told him to follow up with GI but still hasnt, could we please get him in soon. Last cln 2016   Thank you!

## 2022-01-26 NOTE — TELEPHONE ENCOUNTER
Dr. Luisa Luna,    Patient states he can only be seen on Wednesdays. Is it ok to add to Amna's schedule?

## 2022-01-26 NOTE — TELEPHONE ENCOUNTER
Yes, if he can only do clinic Wednesdays, he can see Jessee Villarreal on a Wednesday in February.  Thx

## 2022-01-26 NOTE — TELEPHONE ENCOUNTER
Dr. Linh Noel,    Message received from Dr. Desmond Hollis, requesting for patient to be seen soon. Please advise, thank you!

## 2022-01-26 NOTE — TELEPHONE ENCOUNTER
Left message to call back to schedule appt. If patient returns call please offer/schedule appt on 2/4 Friday at [facility] with Dr. Rasmussen Gone. Please route back if confirmed/declined.

## 2022-01-26 NOTE — TELEPHONE ENCOUNTER
Contacted patient and confirmed appt to see Gustave Leyden on 2/2 at 1:30p. Location/date/time details provided.     Your appointments     Date & Time Appointment Department [facility])    Feb 02, 2022  1:30 PM CST Follow Up Visit with FAITH Lopez [facility]

## 2022-01-27 NOTE — PROGRESS NOTES
HPI:    Patient ID: Malaika Garcia is a 73year old male. HPI  Patient presents with:   Follow - Up: on back pain and weight feeling better   Elbow Pain: bilateral elbow pain X 3 weeks     Follow - Up: on back pain and weight feeling better   Elbow Pain: b (500 mg total) by mouth nightly as needed. 30 tablet 0   • atorvastatin 20 MG Oral Tab Take 1 tablet (20 mg total) by mouth every evening.  90 tablet 1   • metFORMIN HCl 1000 MG Oral Tab Take 1 tablet (1,000 mg total) by mouth 2 (two) times daily with meals given his weight loss as well as iron deficiency anemia.  - [facility]STR[facility] - INTERNAL    4. Lack of appetite    - GASTRO - INTERNAL    5.  Iron deficiency anemia, unspecified iron deficiency anemia type  Hb today 10.6  - [facility]STR[facility] - INTERNAL    6. Vitamin B12 deficien

## 2022-01-28 ENCOUNTER — TELEPHONE (OUTPATIENT)
Dept: CASE MANAGEMENT | Age: 72
End: 2022-01-28

## 2022-01-28 NOTE — TELEPHONE ENCOUNTER
Patient is eligible for a 2022 Medicare Annual Wellness visit. This visit can be scheduled any time in the calendar year. Discussed in detail w/patient. AHA scheduled for 2/23/22.

## 2022-02-01 DIAGNOSIS — E11.9 DIABETES MELLITUS TYPE 2 WITHOUT RETINOPATHY (HCC): ICD-10-CM

## 2022-02-01 DIAGNOSIS — E78.00 HYPERCHOLESTEREMIA: ICD-10-CM

## 2022-02-01 RX ORDER — ATORVASTATIN CALCIUM 20 MG/1
20 TABLET, FILM COATED ORAL EVERY EVENING
Qty: 90 TABLET | Refills: 1 | Status: SHIPPED | OUTPATIENT
Start: 2022-02-01 | End: 2022-07-29

## 2022-02-01 NOTE — TELEPHONE ENCOUNTER
Refill passed per [employer] protocol. Requested Prescriptions   Pending Prescriptions Disp Refills    atorvastatin 20 MG Oral Tab 90 tablet 1     Sig: Take 1 tablet (20 mg total) by mouth every evening. Cholesterol Medication Protocol Passed - 2/1/2022  4:41 PM        Passed - ALT in past 12 months        Passed - LDL in past 12 months        Passed - Last ALT < 80       Lab Results   Component Value Date    ALT 16 12/23/2021             Passed - Last LDL < 130     Lab Results   Component Value Date    LDL 65 12/23/2021               Passed - Appointment in past 12 or next 3 months           metFORMIN HCl 1000 MG Oral Tab 180 tablet 1     Sig: Take 1 tablet (1,000 mg total) by mouth 2 (two) times daily with meals.         Diabetes Medication Protocol Passed - 2/1/2022  4:41 PM        Passed - Last A1C < 7.5 and within past 6 months     Lab Results   Component Value Date    A1C 6.1 (H) 12/23/2021               Passed - Appointment in past 6 or next 3 months        Passed - GFR Non- > 50     Lab Results   Component Value Date    GFRNAA 94 12/23/2021                 Passed - GFR in the past 12 months            Recent Outpatient Visits              6 days ago Chronic bilateral low back pain without sciatica    Ryley Taylor MD    Office Visit    1 month ago Chronic bilateral low back pain without sciatica    Ryley Taylor MD    Office Visit    3 months ago Chronic cough    Ryley Taylor MD    Office Visit    4 months ago Vitamin B12 deficiency    [address], [facility], [facility]    Nurse Only    5 months ago Urinary urgency    [address]  Urology Bernie Rosenbaum MD    Office Visit          Future Appointments         Provider Department Appt Notes    Tomorrow Ronald Cardoso, [facility] follow/up/ok per [employer]    [facility]     In 1 month Russell Sparks MD [facility], [place] Return in about 1 year (around 3/23/2022).

## 2022-02-02 ENCOUNTER — OFFICE VISIT (OUTPATIENT)
Dept: GASTROENTEROLOGY | Facility: CLINIC | Age: 72
End: 2022-02-02
Payer: MEDICARE

## 2022-02-02 ENCOUNTER — TELEPHONE (OUTPATIENT)
Dept: GASTROENTEROLOGY | Facility: CLINIC | Age: 72
End: 2022-02-02

## 2022-02-02 VITALS
HEIGHT: 64 IN | SYSTOLIC BLOOD PRESSURE: 121 MMHG | BODY MASS INDEX: 21 KG/M2 | DIASTOLIC BLOOD PRESSURE: 73 MMHG | TEMPERATURE: 99 F | HEART RATE: 93 BPM | WEIGHT: 123 LBS

## 2022-02-02 DIAGNOSIS — D50.9 IRON DEFICIENCY ANEMIA, UNSPECIFIED IRON DEFICIENCY ANEMIA TYPE: ICD-10-CM

## 2022-02-02 DIAGNOSIS — R63.0 DECREASED APPETITE: ICD-10-CM

## 2022-02-02 DIAGNOSIS — R63.4 WEIGHT LOSS: Primary | ICD-10-CM

## 2022-02-02 PROCEDURE — 3078F DIAST BP <80 MM HG: CPT | Performed by: NURSE PRACTITIONER

## 2022-02-02 PROCEDURE — 3074F SYST BP LT 130 MM HG: CPT | Performed by: NURSE PRACTITIONER

## 2022-02-02 PROCEDURE — 99215 OFFICE O/P EST HI 40 MIN: CPT | Performed by: NURSE PRACTITIONER

## 2022-02-02 PROCEDURE — 3008F BODY MASS INDEX DOCD: CPT | Performed by: NURSE PRACTITIONER

## 2022-02-02 RX ORDER — POLYETHYLENE GLYCOL 3350, SODIUM CHLORIDE, SODIUM BICARBONATE, POTASSIUM CHLORIDE 420; 11.2; 5.72; 1.48 G/4L; G/4L; G/4L; G/4L
POWDER, FOR SOLUTION ORAL
Qty: 4000 ML | Refills: 0 | Status: SHIPPED | OUTPATIENT
Start: 2022-02-02

## 2022-02-02 RX ORDER — POLYETHYLENE GLYCOL 3350, SODIUM CHLORIDE, SODIUM BICARBONATE, POTASSIUM CHLORIDE 420; 11.2; 5.72; 1.48 G/4L; G/4L; G/4L; G/4L
POWDER, FOR SOLUTION ORAL
Qty: 4000 ML | Refills: 0 | Status: SHIPPED | OUTPATIENT
Start: 2022-02-02 | End: 2022-02-02

## 2022-02-02 NOTE — PATIENT INSTRUCTIONS
-boost/ensure meal supplement  -ct scan  -repeat labs    1. Schedule colonoscopy/egd with MAC w/ Dr. Slim Osorio or Dr. Mariam Cuadra: weight loss, iron def anemia, decreased appetite ]    2.  bowel prep from pharmacy (split trilyte)    3. Hold metformin day before and day of procedure    4. Read all bowel prep instructions carefully    5. AVOID seeds, nuts, popcorn, raw fruits and vegetables (cooked is okay) for 2-3 days before procedure    6. You will need to be tested for COVID within 72 hours of your procedure. You will be contacted with instructions on how to do this.       >>>Please note: if you were prescribed Suprep for the bowel prep and it is too expensive or not covered by insurance, it is okay to substitute Trilyte (or any similar generic prep). This can be done by notifying the pharmacy or calling our office.

## 2022-02-02 NOTE — TELEPHONE ENCOUNTER
Scheduled for:  Colonoscopy 6[address]12 EGD [address]305  Provider Name:  Dr Saturnino Demarco  Date:  2022  Location:  [facility]  Sedation:  MAC  Time:  11:00am (pt is aware to arrive at 10:00am)  Prep:  Trilyte  Meds/Allergies Reconciled?:  Amna/NP reviewed. Diagnosis with codes:  Weight loss R63.4 Iron deficient anemia D50.9  appatite R63.0  Was patient informed to call insurance with codes (Y/N):  Yes  Referral sent?:  Referral was sent at the time of electronic surgical scheduling. [facility] or [address] notified?:  I sent an electronic request to Endo Scheduling and received a confirmation today. Medication Orders:   This patient verbally confirmed that he is not taking:  Iron, blood thinners, BP meds, and is diabetic  Not latex allergy, Not PCN allergy and does not have a pacemaker  Patient is aware to HOLD Metformin the day before and the day of the procedure  Pt is aware to NOT take iron pills, herbal meds and diet supplements for 7 days before exam. Also to NOT take any form of alcohol, recreational drugs and any forms of ED meds 24 hours before exam.       Misc Orders:  Patient is aware that the ENDO dept will call to schedule a COVID 19 test before the procedure      Further instructions given by staff:  I discussed the prep instructions with the patient at the time of the appointment which he verbally understood

## 2022-02-03 ENCOUNTER — TELEPHONE (OUTPATIENT)
Dept: GASTROENTEROLOGY | Facility: CLINIC | Age: 72
End: 2022-02-03

## 2022-02-03 NOTE — TELEPHONE ENCOUNTER
Called patient to reschedule procedure as requested patient stated he is having insurance issues will call at a later date to reschedule Colonoscopy/EGd procedure     SK

## 2022-02-15 NOTE — PROGRESS NOTES
HPI:    Patient ID: Amina Thomas is a 78year old male. HPI  Patient presents with:  Cholesterol: follow up  Diabetes: follow up     Patient feels good. He is watching his diet.   He states he has a very light piece of food at suppertime and usually only 1 TABLET BY MOUTH NIGHTLY 90 tablet 3   • Omeprazole 40 MG Oral Capsule Delayed Release Take 1 capsule (40 mg total) by mouth daily. Take 1 capsule by mouth daily before breakfast. 90 capsule 3   • aspirin 81 MG Oral Tab Take 81 mg by mouth daily.        Al Keystone Flap Text: The defect edges were debeveled with a #15 scalpel blade.  Given the location of the defect, shape of the defect a keystone flap was deemed most appropriate.  Using a sterile surgical marker, an appropriate keystone flap was drawn incorporating the defect, outlining the appropriate donor tissue and placing the expected incisions within the relaxed skin tension lines where possible. The area thus outlined was incised deep to adipose tissue with a #15 scalpel blade.  The skin margins were undermined to an appropriate distance in all directions around the primary defect and laterally outward around the flap utilizing iris scissors.

## 2022-02-16 RX ORDER — OMEPRAZOLE 40 MG/1
CAPSULE, DELAYED RELEASE ORAL
Qty: 30 CAPSULE | Refills: 0 | OUTPATIENT
Start: 2022-02-16

## 2022-02-16 NOTE — TELEPHONE ENCOUNTER
Please review; protocol failed/No Protcol    Requested Prescriptions   Pending Prescriptions Disp Refills    OMEPRAZOLE 40 MG Oral Capsule Delayed Release [Pharmacy Med Name: OMEPRAZOLE 40MG CAPSULES] 30 capsule 0     Sig: TAKE 1 CAPSULE(40 MG) BY MOUTH DAILY BEFORE BREAKFAST        Gastrointestional Medication Protocol Passed - 2/16/2022  2:49 PM        Passed - Appointment in past 12 or next 3 months              Recent Outpatient Visits              2 weeks ago Weight loss    Bernarda Woods [place] [employer] Visit    3 weeks ago Chronic bilateral low back pain without sciatica    Jamey Robles MD    Office Visit    1 month ago Chronic bilateral low back pain without sciatica    Jamey Robles MD    Office Visit    3 months ago Chronic cough    Jamey Robles MD    Office Visit    4 months ago Vitamin B12 deficiency    [facility], [address]    Nurse Only            Future Appointments         Provider Department Appt Notes    In 1 week Adam Parsons MD [facility], [address]     In 1 month [place], PROCEDURE [facility] GI PROCEDURE [email or web address]    In 1 month Monisha Pratt MD Ophthalmology - [place] Return in about 1 year (around 3/23/2022).

## 2022-02-23 ENCOUNTER — OFFICE VISIT (OUTPATIENT)
Dept: FAMILY MEDICINE CLINIC | Facility: CLINIC | Age: 72
End: 2022-02-23
Payer: MEDICARE

## 2022-02-23 VITALS
WEIGHT: 117 LBS | BODY MASS INDEX: 19.97 KG/M2 | HEIGHT: 64 IN | HEART RATE: 84 BPM | DIASTOLIC BLOOD PRESSURE: 69 MMHG | SYSTOLIC BLOOD PRESSURE: 128 MMHG | TEMPERATURE: 99 F

## 2022-02-23 DIAGNOSIS — G89.29 CHRONIC BILATERAL LOW BACK PAIN WITHOUT SCIATICA: ICD-10-CM

## 2022-02-23 DIAGNOSIS — Z00.00 ENCOUNTER FOR ANNUAL HEALTH EXAMINATION: Primary | ICD-10-CM

## 2022-02-23 DIAGNOSIS — H90.A31 MIXED CONDUCTIVE AND SENSORINEURAL HEARING LOSS OF RIGHT EAR WITH RESTRICTED HEARING OF LEFT EAR: ICD-10-CM

## 2022-02-23 DIAGNOSIS — I25.10 CORONARY ARTERY CALCIFICATION SEEN ON CAT SCAN: ICD-10-CM

## 2022-02-23 DIAGNOSIS — K21.9 GASTROESOPHAGEAL REFLUX DISEASE WITHOUT ESOPHAGITIS: ICD-10-CM

## 2022-02-23 DIAGNOSIS — E11.9 DIABETES MELLITUS TYPE 2 WITHOUT RETINOPATHY (HCC): ICD-10-CM

## 2022-02-23 DIAGNOSIS — I10 ESSENTIAL HYPERTENSION: ICD-10-CM

## 2022-02-23 DIAGNOSIS — J84.10 PULMONARY FIBROSIS (HCC): ICD-10-CM

## 2022-02-23 DIAGNOSIS — R05.3 CHRONIC COUGH: ICD-10-CM

## 2022-02-23 DIAGNOSIS — E78.00 HYPERCHOLESTEREMIA: ICD-10-CM

## 2022-02-23 DIAGNOSIS — I45.10 RIGHT BUNDLE BRANCH BLOCK: ICD-10-CM

## 2022-02-23 DIAGNOSIS — D50.9 IRON DEFICIENCY ANEMIA, UNSPECIFIED IRON DEFICIENCY ANEMIA TYPE: ICD-10-CM

## 2022-02-23 DIAGNOSIS — M54.50 CHRONIC BILATERAL LOW BACK PAIN WITHOUT SCIATICA: ICD-10-CM

## 2022-02-23 DIAGNOSIS — E53.8 VITAMIN B12 DEFICIENCY: ICD-10-CM

## 2022-02-23 DIAGNOSIS — R63.4 WEIGHT LOSS, UNINTENTIONAL: ICD-10-CM

## 2022-02-23 DIAGNOSIS — R22.2 PLEURAL NODULES: ICD-10-CM

## 2022-02-23 DIAGNOSIS — J84.9 INTERSTITIAL LUNG DISEASE (HCC): ICD-10-CM

## 2022-02-23 DIAGNOSIS — R63.0 LACK OF APPETITE: ICD-10-CM

## 2022-02-23 DIAGNOSIS — K44.9 HIATAL HERNIA: ICD-10-CM

## 2022-02-23 PROBLEM — M77.12 LATERAL EPICONDYLITIS OF BOTH ELBOWS: Status: RESOLVED | Noted: 2021-07-22 | Resolved: 2022-02-23

## 2022-02-23 PROBLEM — B02.22 POST-HERPETIC TRIGEMINAL NEURALGIA: Status: RESOLVED | Noted: 2019-07-15 | Resolved: 2022-02-23

## 2022-02-23 PROBLEM — M77.11 LATERAL EPICONDYLITIS OF BOTH ELBOWS: Status: RESOLVED | Noted: 2021-07-22 | Resolved: 2022-02-23

## 2022-02-23 PROBLEM — I49.1 PAC (PREMATURE ATRIAL CONTRACTION): Status: RESOLVED | Noted: 2021-03-25 | Resolved: 2022-02-23

## 2022-02-23 PROBLEM — R79.89 LOW VITAMIN B12 LEVEL: Status: RESOLVED | Noted: 2021-08-04 | Resolved: 2022-02-23

## 2022-02-23 PROCEDURE — 3078F DIAST BP <80 MM HG: CPT | Performed by: FAMILY MEDICINE

## 2022-02-23 PROCEDURE — 3074F SYST BP LT 130 MM HG: CPT | Performed by: FAMILY MEDICINE

## 2022-02-23 PROCEDURE — G0439 PPPS, SUBSEQ VISIT: HCPCS | Performed by: FAMILY MEDICINE

## 2022-02-23 PROCEDURE — 3008F BODY MASS INDEX DOCD: CPT | Performed by: FAMILY MEDICINE

## 2022-02-23 PROCEDURE — 99397 PER PM REEVAL EST PAT 65+ YR: CPT | Performed by: FAMILY MEDICINE

## 2022-02-23 PROCEDURE — 96160 PT-FOCUSED HLTH RISK ASSMT: CPT | Performed by: FAMILY MEDICINE

## 2022-02-24 ENCOUNTER — TELEPHONE (OUTPATIENT)
Dept: GASTROENTEROLOGY | Facility: CLINIC | Age: 72
End: 2022-02-24

## 2022-02-24 LAB
ABSOLUTE BASOPHILS: 66 CELLS/UL (ref 0–200)
ABSOLUTE EOSINOPHILS: 299 CELLS/UL (ref 15–500)
ABSOLUTE LYMPHOCYTES: 2498 CELLS/UL (ref 850–3900)
ABSOLUTE MONOCYTES: 540 CELLS/UL (ref 200–950)
ABSOLUTE NEUTROPHILS: 4897 CELLS/UL (ref 1500–7800)
BASOPHILS: 0.8 %
EOSINOPHILS: 3.6 %
HEMATOCRIT: 31.7 % (ref 38.5–50)
HEMOGLOBIN: 9.6 G/DL (ref 13.2–17.1)
LYMPHOCYTES: 30.1 %
MCH: 23.9 PG (ref 27–33)
MCHC: 30.3 G/DL (ref 32–36)
MCV: 79.1 FL (ref 80–100)
MONOCYTES: 6.5 %
MPV: 10.4 FL (ref 7.5–12.5)
NEUTROPHILS: 59 %
PLATELET COUNT: 438 THOUSAND/UL (ref 140–400)
RDW: 13.7 % (ref 11–15)
RED BLOOD CELL COUNT: 4.01 MILLION/UL (ref 4.2–5.8)
WHITE BLOOD CELL COUNT: 8.3 THOUSAND/UL (ref 3.8–10.8)

## 2022-02-24 NOTE — TELEPHONE ENCOUNTER
02/24/2022  CBLM to reschedule patients Colon/EGd to sooner date of 03/05 with Dr Chauncey Shone           Please transfer call to [address]

## 2022-02-24 NOTE — TELEPHONE ENCOUNTER
Scheduling:  I am concerned about his lab results and symptoms. He is scheduled for 3/23 for cln/egd. If there are any earlier openings/if there is a cancellation, he should have his procedure asap.      Thanks,  Buffy Arriaga

## 2022-02-25 ENCOUNTER — TELEPHONE (OUTPATIENT)
Dept: FAMILY MEDICINE CLINIC | Facility: CLINIC | Age: 72
End: 2022-02-25

## 2022-02-25 RX ORDER — OMEPRAZOLE 40 MG/1
40 CAPSULE, DELAYED RELEASE ORAL DAILY
Qty: 90 CAPSULE | Refills: 2 | Status: SHIPPED | OUTPATIENT
Start: 2022-02-25

## 2022-02-25 NOTE — TELEPHONE ENCOUNTER
Dr Alyssa Tamayo    Patient is scheduled to have a Cornia eye surgery  That he is requesting to have done prior to Colon/EGD     Patient is aware that it was advised to proceed sooner with colon/EGD procedure than original date of procedure 03/23/2022     Patient stated because his other procedure has already been scheduled hed prefer to have his eye surgery first     Please advise     Thank Suen Weber

## 2022-02-28 NOTE — TELEPHONE ENCOUNTER
02/28/2022 CBLm to reschedule colonoscopy/EGD procedure told patient to ask to speak to [place] [phone]

## 2022-02-28 NOTE — TELEPHONE ENCOUNTER
Pt contacted by apn and discussed recommendation for Cln/Egd asap to further eval anemia as cannot exclude malignancy at present. He verbalizes understanding and is electing to keep endoscopy as scheduled.

## 2022-03-02 ENCOUNTER — HOSPITAL ENCOUNTER (OUTPATIENT)
Dept: CV DIAGNOSTICS | Age: 72
Discharge: HOME | End: 2022-03-02
Attending: INTERNAL MEDICINE
Payer: MEDICARE

## 2022-03-02 DIAGNOSIS — R94.31 ABNORMAL ECG: ICD-10-CM

## 2022-03-02 DIAGNOSIS — R93.89 ABNORMAL FINDINGS ON DIAGNOSTIC IMAGING OF CARDIOVASCULAR SYSTEM: ICD-10-CM

## 2022-03-02 PROCEDURE — 93306 TTE W/DOPPLER COMPLETE: CPT | Performed by: INTERNAL MEDICINE

## 2022-03-09 ENCOUNTER — HOSPITAL ENCOUNTER (OUTPATIENT)
Dept: CT IMAGING | Age: 72
Discharge: HOME | End: 2022-03-09
Attending: NURSE PRACTITIONER
Payer: MEDICARE

## 2022-03-09 DIAGNOSIS — R63.0 DECREASED APPETITE: ICD-10-CM

## 2022-03-09 DIAGNOSIS — R63.4 WEIGHT LOSS: ICD-10-CM

## 2022-03-09 DIAGNOSIS — D50.9 IRON DEFICIENCY ANEMIA, UNSPECIFIED IRON DEFICIENCY ANEMIA TYPE: ICD-10-CM

## 2022-03-09 LAB — CREAT BLD-MCNC: 0.8 MG/DL

## 2022-03-09 PROCEDURE — 82565 ASSAY OF CREATININE: CPT

## 2022-03-09 PROCEDURE — 74177 CT ABD & PELVIS W/CONTRAST: CPT | Performed by: NURSE PRACTITIONER

## 2022-03-15 NOTE — TELEPHONE ENCOUNTER
03/15/2022 CBLM to confirm date of colonoscopy and EGd procedure  Will wait for patient to return call     Akron Yoder

## 2022-03-16 ENCOUNTER — TELEPHONE (OUTPATIENT)
Dept: FAMILY MEDICINE CLINIC | Facility: CLINIC | Age: 72
End: 2022-03-16

## 2022-03-16 NOTE — TELEPHONE ENCOUNTER
Dr. Nata Huff office is asking if the referral can contain their offices tax ID and have it sent to [facility]      Tax ID [id number]

## 2022-04-06 ENCOUNTER — OFFICE VISIT (OUTPATIENT)
Dept: FAMILY MEDICINE CLINIC | Facility: CLINIC | Age: 72
End: 2022-04-06
Payer: MEDICARE

## 2022-04-06 VITALS
BODY MASS INDEX: 19.81 KG/M2 | TEMPERATURE: 98 F | SYSTOLIC BLOOD PRESSURE: 116 MMHG | DIASTOLIC BLOOD PRESSURE: 70 MMHG | HEIGHT: 64 IN | HEART RATE: 91 BPM | WEIGHT: 116 LBS

## 2022-04-06 DIAGNOSIS — R19.7 DIARRHEA, UNSPECIFIED TYPE: ICD-10-CM

## 2022-04-06 DIAGNOSIS — R63.4 WEIGHT LOSS, UNINTENTIONAL: Primary | ICD-10-CM

## 2022-04-06 DIAGNOSIS — R91.1 NODULE OF MIDDLE LOBE OF RIGHT LUNG: ICD-10-CM

## 2022-04-06 DIAGNOSIS — J84.10 PULMONARY FIBROSIS (HCC): ICD-10-CM

## 2022-04-06 DIAGNOSIS — K44.9 HIATAL HERNIA: ICD-10-CM

## 2022-04-06 DIAGNOSIS — Q63.9 RENAL STRUCTURAL ABNORMALITY: ICD-10-CM

## 2022-04-06 DIAGNOSIS — D50.9 IRON DEFICIENCY ANEMIA, UNSPECIFIED IRON DEFICIENCY ANEMIA TYPE: ICD-10-CM

## 2022-04-06 DIAGNOSIS — N32.3 BLADDER DIVERTICULUM: ICD-10-CM

## 2022-04-06 DIAGNOSIS — R63.0 POOR APPETITE: ICD-10-CM

## 2022-04-06 DIAGNOSIS — K57.30 DIVERTICULOSIS OF COLON: ICD-10-CM

## 2022-04-06 DIAGNOSIS — E11.9 DIABETES MELLITUS TYPE 2 WITHOUT RETINOPATHY (HCC): ICD-10-CM

## 2022-04-06 DIAGNOSIS — J84.9 INTERSTITIAL LUNG DISEASE (HCC): ICD-10-CM

## 2022-04-06 PROCEDURE — 3008F BODY MASS INDEX DOCD: CPT | Performed by: FAMILY MEDICINE

## 2022-04-06 PROCEDURE — 3074F SYST BP LT 130 MM HG: CPT | Performed by: FAMILY MEDICINE

## 2022-04-06 PROCEDURE — 99214 OFFICE O/P EST MOD 30 MIN: CPT | Performed by: FAMILY MEDICINE

## 2022-04-06 PROCEDURE — 3078F DIAST BP <80 MM HG: CPT | Performed by: FAMILY MEDICINE

## 2022-04-07 LAB
% SATURATION: 6 % (CALC) (ref 20–48)
ABSOLUTE BASOPHILS: 86 CELLS/UL (ref 0–200)
ABSOLUTE EOSINOPHILS: 335 CELLS/UL (ref 15–500)
ABSOLUTE LYMPHOCYTES: 2543 CELLS/UL (ref 850–3900)
ABSOLUTE MONOCYTES: 671 CELLS/UL (ref 200–950)
ABSOLUTE NEUTROPHILS: 4165 CELLS/UL (ref 1500–7800)
APPEARANCE: CLEAR
BASOPHILS: 1.1 %
BILIRUBIN: NEGATIVE
COLOR: YELLOW
EOSINOPHILS: 4.3 %
GLUCOSE: NEGATIVE
HEMATOCRIT: 32.9 % (ref 38.5–50)
HEMOGLOBIN: 9.9 G/DL (ref 13.2–17.1)
IRON BINDING CAPACITY: 448 MCG/DL (CALC) (ref 250–425)
IRON, TOTAL: 26 MCG/DL (ref 50–180)
KETONES: NEGATIVE
LEUKOCYTE ESTERASE: NEGATIVE
LYMPHOCYTES: 32.6 %
MCH: 23.9 PG (ref 27–33)
MCHC: 30.1 G/DL (ref 32–36)
MCV: 79.5 FL (ref 80–100)
MONOCYTES: 8.6 %
MPV: 10 FL (ref 7.5–12.5)
NEUTROPHILS: 53.4 %
NITRITE: NEGATIVE
OCCULT BLOOD: NEGATIVE
PH: 8 (ref 5–8)
PLATELET COUNT: 455 THOUSAND/UL (ref 140–400)
PROTEIN: NEGATIVE
RDW: 14 % (ref 11–15)
RED BLOOD CELL COUNT: 4.14 MILLION/UL (ref 4.2–5.8)
SPECIFIC GRAVITY: 1.01 (ref 1–1.03)
WHITE BLOOD CELL COUNT: 7.8 THOUSAND/UL (ref 3.8–10.8)

## 2022-04-12 DIAGNOSIS — E11.9 DIABETES MELLITUS TYPE 2 WITHOUT RETINOPATHY (HCC): ICD-10-CM

## 2022-04-12 NOTE — TELEPHONE ENCOUNTER
Refill passed per [facility] [place], [facility] protocol. Requested Prescriptions   Pending Prescriptions Disp Refills    metFORMIN HCl 1000 MG Oral Tab 180 tablet 1     Sig: Take 1 tablet (1,000 mg total) by mouth 2 (two) times daily with meals.         Diabetes Medication Protocol Passed - 4/12/2022  2:51 PM        Passed - Last A1C < 7.5 and within past 6 months     Lab Results   Component Value Date    A1C 6.1 (H) 12/23/2021               Passed - Appointment in past 6 or next 3 months        Passed - GFR Non- > 50     Lab Results   Component Value Date    GFRNAA 91 03/09/2022                 Passed - GFR in the past 12 months                Recent Outpatient Visits              6 days ago Weight loss, unintentional    [facility], [facility], [address] MD    Office Visit    1 month ago Encounter for annual health examination    [facility], [facility], [address] MD    Office Visit    2 months ago Wells Yany loss    [facility], [facility], [facility], Norma Menchaca APRN    Office Visit    2 months ago Chronic bilateral low back pain without sciatica    [facility], [facility], [address] MD    Office Visit    3 months ago Chronic bilateral low back pain without sciatica    Raciel Allison MD    Office Visit             Future Appointments         Provider Department Appt Notes    In 1 week Claudia. Latisha [address]     In 4 weeks Darci Dutton, [facility], [address], [place] bladder, informed of policy    In 1 month Rosanne Weathers, [address], [facility] iron def, informed of policy    In 2 months [place], [address], [address],last seen 34/77/6107  informed of policy

## 2022-04-20 ENCOUNTER — HOSPITAL ENCOUNTER (OUTPATIENT)
Dept: CT IMAGING | Age: 72
Discharge: HOME | End: 2022-04-20
Attending: FAMILY MEDICINE
Payer: MEDICARE

## 2022-04-20 PROCEDURE — 71260 CT THORAX DX C+: CPT | Performed by: FAMILY MEDICINE

## 2022-04-27 ENCOUNTER — LAB ENCOUNTER (OUTPATIENT)
Dept: LAB | Age: 72
End: 2022-04-27
Attending: FAMILY MEDICINE
Payer: MEDICARE

## 2022-04-27 ENCOUNTER — OFFICE VISIT (OUTPATIENT)
Dept: FAMILY MEDICINE CLINIC | Facility: CLINIC | Age: 72
End: 2022-04-27
Payer: MEDICARE

## 2022-04-27 VITALS
WEIGHT: 118 LBS | HEART RATE: 71 BPM | BODY MASS INDEX: 20.14 KG/M2 | DIASTOLIC BLOOD PRESSURE: 66 MMHG | TEMPERATURE: 99 F | SYSTOLIC BLOOD PRESSURE: 121 MMHG | HEIGHT: 64 IN

## 2022-04-27 DIAGNOSIS — Z01.818 PREOP EXAMINATION: Primary | ICD-10-CM

## 2022-04-27 DIAGNOSIS — D50.9 IRON DEFICIENCY ANEMIA, UNSPECIFIED IRON DEFICIENCY ANEMIA TYPE: ICD-10-CM

## 2022-04-27 DIAGNOSIS — E11.9 DIABETES MELLITUS TYPE 2 WITHOUT RETINOPATHY (HCC): ICD-10-CM

## 2022-04-27 DIAGNOSIS — E78.00 HYPERCHOLESTEREMIA: ICD-10-CM

## 2022-04-27 DIAGNOSIS — J84.10 PULMONARY FIBROSIS (HCC): ICD-10-CM

## 2022-04-27 DIAGNOSIS — I10 ESSENTIAL HYPERTENSION: ICD-10-CM

## 2022-04-27 DIAGNOSIS — Z01.818 PREOP EXAMINATION: ICD-10-CM

## 2022-04-27 DIAGNOSIS — E53.8 VITAMIN B12 DEFICIENCY: ICD-10-CM

## 2022-04-27 PROCEDURE — 3008F BODY MASS INDEX DOCD: CPT | Performed by: FAMILY MEDICINE

## 2022-04-27 PROCEDURE — 93005 ELECTROCARDIOGRAM TRACING: CPT

## 2022-04-27 PROCEDURE — 3078F DIAST BP <80 MM HG: CPT | Performed by: FAMILY MEDICINE

## 2022-04-27 PROCEDURE — 93010 ELECTROCARDIOGRAM REPORT: CPT | Performed by: FAMILY MEDICINE

## 2022-04-27 PROCEDURE — 3074F SYST BP LT 130 MM HG: CPT | Performed by: FAMILY MEDICINE

## 2022-04-27 PROCEDURE — 99214 OFFICE O/P EST MOD 30 MIN: CPT | Performed by: FAMILY MEDICINE

## 2022-04-27 PROCEDURE — 3044F HG A1C LEVEL LT 7.0%: CPT | Performed by: FAMILY MEDICINE

## 2022-04-27 RX ORDER — PREDNISOLONE ACETATE 10 MG/ML
SUSPENSION/ DROPS OPHTHALMIC
COMMUNITY
Start: 2022-04-11

## 2022-04-27 RX ORDER — FERROUS SULFATE 325(65) MG
325 TABLET ORAL
Qty: 90 TABLET | Refills: 0 | Status: SHIPPED | OUTPATIENT
Start: 2022-04-27

## 2022-04-27 RX ORDER — OFLOXACIN 3 MG/ML
SOLUTION/ DROPS OPHTHALMIC
COMMUNITY
Start: 2022-04-11

## 2022-04-27 RX ORDER — KETOROLAC TROMETHAMINE 5 MG/ML
SOLUTION OPHTHALMIC
COMMUNITY
Start: 2022-04-11

## 2022-04-28 LAB
ABSOLUTE BASOPHILS: 57 CELLS/UL (ref 0–200)
ABSOLUTE EOSINOPHILS: 303 CELLS/UL (ref 15–500)
ABSOLUTE LYMPHOCYTES: 2411 CELLS/UL (ref 850–3900)
ABSOLUTE MONOCYTES: 582 CELLS/UL (ref 200–950)
ABSOLUTE NEUTROPHILS: 4846 CELLS/UL (ref 1500–7800)
ALBUMIN/GLOBULIN RATIO: 1.6 (CALC) (ref 1–2.5)
ALBUMIN: 4.4 G/DL (ref 3.6–5.1)
ALKALINE PHOSPHATASE: 76 U/L (ref 35–144)
ALT: 10 U/L (ref 9–46)
AST: 16 U/L (ref 10–35)
BASOPHILS: 0.7 %
BILIRUBIN, TOTAL: 0.3 MG/DL (ref 0.2–1.2)
BUN: 9 MG/DL (ref 7–25)
CALCIUM: 9.6 MG/DL (ref 8.6–10.3)
CARBON DIOXIDE: 26 MMOL/L (ref 20–32)
CHLORIDE: 104 MMOL/L (ref 98–110)
CREATININE: 0.72 MG/DL (ref 0.7–1.25)
EGFR IF AFRICN AM: 110 ML/MIN/1.73M2
EGFR IF NONAFRICN AM: 95 ML/MIN/1.73M2
EOSINOPHILS: 3.7 %
GLOBULIN: 2.8 G/DL (CALC) (ref 1.9–3.7)
GLUCOSE: 99 MG/DL (ref 65–99)
HEMATOCRIT: 30.7 % (ref 38.5–50)
HEMOGLOBIN A1C: 6 % OF TOTAL HGB
HEMOGLOBIN: 9.4 G/DL (ref 13.2–17.1)
LYMPHOCYTES: 29.4 %
MCH: 24.1 PG (ref 27–33)
MCHC: 30.6 G/DL (ref 32–36)
MCV: 78.7 FL (ref 80–100)
MONOCYTES: 7.1 %
MPV: 9.7 FL (ref 7.5–12.5)
NEUTROPHILS: 59.1 %
PLATELET COUNT: 445 THOUSAND/UL (ref 140–400)
POTASSIUM: 4.7 MMOL/L (ref 3.5–5.3)
PROTEIN, TOTAL: 7.2 G/DL (ref 6.1–8.1)
RDW: 14.2 % (ref 11–15)
RED BLOOD CELL COUNT: 3.9 MILLION/UL (ref 4.2–5.8)
SODIUM: 139 MMOL/L (ref 135–146)
VITAMIN B12: 978 PG/ML (ref 200–1100)
WHITE BLOOD CELL COUNT: 8.2 THOUSAND/UL (ref 3.8–10.8)

## 2022-05-02 ENCOUNTER — TELEPHONE (OUTPATIENT)
Dept: FAMILY MEDICINE CLINIC | Facility: CLINIC | Age: 72
End: 2022-05-02

## 2022-05-02 NOTE — TELEPHONE ENCOUNTER
Called pt to inform him that Dr. Chayito Heaton spoke with surgeon  Dr. Joyce Conklin and both agreed to cancel the cataract surgery that was scheduled for tomorrow May 3, 2022 due to his hemoglobin has been decreasing and also weight loss Pt informed to see gastroentorologist first pt voice understanding.

## 2022-05-11 ENCOUNTER — OFFICE VISIT (OUTPATIENT)
Dept: SURGERY | Facility: CLINIC | Age: 72
End: 2022-05-11
Payer: MEDICARE

## 2022-05-11 VITALS — DIASTOLIC BLOOD PRESSURE: 75 MMHG | SYSTOLIC BLOOD PRESSURE: 152 MMHG | HEART RATE: 81 BPM

## 2022-05-11 DIAGNOSIS — R35.1 BENIGN PROSTATIC HYPERPLASIA WITH NOCTURIA: Primary | ICD-10-CM

## 2022-05-11 DIAGNOSIS — N40.1 BENIGN PROSTATIC HYPERPLASIA WITH NOCTURIA: Primary | ICD-10-CM

## 2022-05-11 DIAGNOSIS — R33.9 URINARY RETENTION: ICD-10-CM

## 2022-05-11 DIAGNOSIS — N32.3 BLADDER DIVERTICULUM: ICD-10-CM

## 2022-05-11 PROCEDURE — 99213 OFFICE O/P EST LOW 20 MIN: CPT | Performed by: NURSE PRACTITIONER

## 2022-05-11 PROCEDURE — 3078F DIAST BP <80 MM HG: CPT | Performed by: NURSE PRACTITIONER

## 2022-05-11 PROCEDURE — 3077F SYST BP >= 140 MM HG: CPT | Performed by: NURSE PRACTITIONER

## 2022-05-11 PROCEDURE — 51798 US URINE CAPACITY MEASURE: CPT | Performed by: NURSE PRACTITIONER

## 2022-05-11 RX ORDER — TAMSULOSIN HYDROCHLORIDE 0.4 MG/1
0.4 CAPSULE ORAL DAILY
Qty: 90 CAPSULE | Refills: 3 | Status: SHIPPED | OUTPATIENT
Start: 2022-05-11 | End: 2023-05-06

## 2022-05-11 RX ORDER — FINASTERIDE 5 MG/1
5 TABLET, FILM COATED ORAL DAILY
Qty: 90 TABLET | Refills: 3 | Status: SHIPPED | OUTPATIENT
Start: 2022-05-11

## 2022-05-12 ENCOUNTER — MED REC SCAN ONLY (OUTPATIENT)
Dept: FAMILY MEDICINE CLINIC | Facility: CLINIC | Age: 72
End: 2022-05-12

## 2022-05-18 ENCOUNTER — OFFICE VISIT (OUTPATIENT)
Dept: GASTROENTEROLOGY | Facility: CLINIC | Age: 72
End: 2022-05-18
Payer: MEDICARE

## 2022-05-18 ENCOUNTER — TELEPHONE (OUTPATIENT)
Dept: GASTROENTEROLOGY | Facility: CLINIC | Age: 72
End: 2022-05-18

## 2022-05-18 VITALS
BODY MASS INDEX: 21.17 KG/M2 | SYSTOLIC BLOOD PRESSURE: 122 MMHG | HEIGHT: 64 IN | DIASTOLIC BLOOD PRESSURE: 71 MMHG | WEIGHT: 124 LBS | HEART RATE: 101 BPM

## 2022-05-18 DIAGNOSIS — R63.4 WEIGHT LOSS: ICD-10-CM

## 2022-05-18 DIAGNOSIS — D50.9 IRON DEFICIENCY ANEMIA, UNSPECIFIED IRON DEFICIENCY ANEMIA TYPE: Primary | ICD-10-CM

## 2022-05-18 DIAGNOSIS — K21.9 GASTROESOPHAGEAL REFLUX DISEASE, UNSPECIFIED WHETHER ESOPHAGITIS PRESENT: ICD-10-CM

## 2022-05-18 DIAGNOSIS — R63.0 APPETITE LOSS: ICD-10-CM

## 2022-05-18 PROCEDURE — 3074F SYST BP LT 130 MM HG: CPT | Performed by: NURSE PRACTITIONER

## 2022-05-18 PROCEDURE — 3078F DIAST BP <80 MM HG: CPT | Performed by: NURSE PRACTITIONER

## 2022-05-18 PROCEDURE — 99215 OFFICE O/P EST HI 40 MIN: CPT | Performed by: NURSE PRACTITIONER

## 2022-05-18 PROCEDURE — 3008F BODY MASS INDEX DOCD: CPT | Performed by: NURSE PRACTITIONER

## 2022-05-18 RX ORDER — POLYETHYLENE GLYCOL 3350, SODIUM CHLORIDE, SODIUM BICARBONATE, POTASSIUM CHLORIDE 420; 11.2; 5.72; 1.48 G/4L; G/4L; G/4L; G/4L
POWDER, FOR SOLUTION ORAL
Qty: 4000 ML | Refills: 0 | Status: SHIPPED | OUTPATIENT
Start: 2022-05-18

## 2022-05-18 NOTE — TELEPHONE ENCOUNTER
Patient was offered earlier dates available and refused, he opted to scheduled on Wed 8/31/2022. Scheduled for: Colonoscopy 29190/EGD [address]   Provider Name: Dr Marylu Cevallos   Date: Wed 8/31/2022   Location: [facility]   Sedation: MAC   Time: 7:45 am   Prep: split trilyte   Meds/Allergies Reconciled?: NKDA    Diagnosis with codes: KRISTIN D50.9, loss of appetite R63.0   Was patient informed to call insurance with codes (Y/N): Yes   Referral sent?: Yes    [facility] or [address]  notified?: I sent an electronic request to Endo Scheduling and received a confirmation today. Medication Orders: Hold Metformin day before and day of procedure  Hold iron 7 days prior to procedure  Pt is aware to NOT take iron pills, herbal meds and diet supplements for 7 days before exam. Also to NOT take any form of alcohol, recreational drugs and any forms of ED meds 24 hours before exam.      Misc Orders: Patient was informed that they will need a COVID 19 test prior to their procedure. Patient verbally understood & will await a phone call from [facility] to schedule. Further instructions given by staff:   Instructions given and pt verbalized understanding

## 2022-05-19 ENCOUNTER — TELEPHONE (OUTPATIENT)
Dept: FAMILY MEDICINE CLINIC | Facility: CLINIC | Age: 72
End: 2022-05-19

## 2022-05-19 NOTE — TELEPHONE ENCOUNTER
Patient requesting appointment for 6/1 for follow up. Patient stated he is having eye surgery on 6/7. Patient declined appointment for 5/21 that was available.

## 2022-05-20 NOTE — TELEPHONE ENCOUNTER
His eye surgery was not urgent and I had spoke to his eye doctor earlier few weeks ago and he agreed that patient to proceed with GI work up for anemia, and unintentional weight loss and hold off on eye surgeries  Patient offered earlier colonoscopies but scheduled it in aug 2022

## 2022-05-28 ENCOUNTER — OFFICE VISIT (OUTPATIENT)
Dept: FAMILY MEDICINE CLINIC | Facility: CLINIC | Age: 72
End: 2022-05-28
Payer: MEDICARE

## 2022-05-28 VITALS
WEIGHT: 123 LBS | TEMPERATURE: 97 F | HEART RATE: 76 BPM | BODY MASS INDEX: 21 KG/M2 | SYSTOLIC BLOOD PRESSURE: 119 MMHG | DIASTOLIC BLOOD PRESSURE: 62 MMHG | HEIGHT: 64 IN

## 2022-05-28 DIAGNOSIS — Z53.21 PATIENT LEFT WITHOUT BEING SEEN: Primary | ICD-10-CM

## 2022-05-28 PROCEDURE — 3078F DIAST BP <80 MM HG: CPT | Performed by: FAMILY MEDICINE

## 2022-05-28 PROCEDURE — 3074F SYST BP LT 130 MM HG: CPT | Performed by: FAMILY MEDICINE

## 2022-05-28 PROCEDURE — 3008F BODY MASS INDEX DOCD: CPT | Performed by: FAMILY MEDICINE

## 2022-05-28 NOTE — PROGRESS NOTES
Patient was here for preop. He was under the assumption he needs to get his eye surgery prior to his colonoscopy. Inform patient he needs to get his GI evaluation completed first due to unintentional weight loss and iron deficiency anemia. Patient has had unexplained weight loss as well as iron deficiency anemia. He has delayed his upper and lower endoscopy despite several recommendations by myself as well as GI. He recently did see gastroenterology nurse johnnie on May 18. He was again recommended to proceed with colonoscopy and upper endoscopy. Patient was given earlier dates to schedule procedure however patient refused and opted for August 31. After brief discussion with patient visit for today canceled. Patient will be scheduled eye surgery. Also informed patient to contact GI department to get his procedure for upper and lower endoscopy done sooner.   I will send this message to gastro as well  He was confused and agrees with earlier scheduling of EGD/CLN for unexplained weight loss and iron def anemia     HALLIE Beckwith

## 2022-06-01 ENCOUNTER — TELEPHONE (OUTPATIENT)
Dept: GASTROENTEROLOGY | Facility: CLINIC | Age: 72
End: 2022-06-01

## 2022-06-01 NOTE — TELEPHONE ENCOUNTER
Dr. Amy Saeed,    Per Dr. Taylor Polite message below patient now agreeable to be seen sooner for EGD/CLN. Please advise if/where patient can be added sooner, thank you!

## 2022-06-01 NOTE — TELEPHONE ENCOUNTER
Noted, will add him to my cancellation list.     Modesto Orozco- if you see an opening for a double, please have patient get in sooner. Thx    Currently scheduled for 8/31/22.

## 2022-06-01 NOTE — TELEPHONE ENCOUNTER
06/01/2022 cblm to schedule patient sooner with Dr Christopher Nicole please transfer call to [facility]

## 2022-07-29 DIAGNOSIS — E78.00 HYPERCHOLESTEREMIA: ICD-10-CM

## 2022-07-29 RX ORDER — ATORVASTATIN CALCIUM 20 MG/1
20 TABLET, FILM COATED ORAL EVERY EVENING
Qty: 90 TABLET | Refills: 1 | Status: SHIPPED | OUTPATIENT
Start: 2022-07-29 | End: 2022-12-14

## 2022-08-03 ENCOUNTER — OFFICE VISIT (OUTPATIENT)
Dept: SURGERY | Facility: CLINIC | Age: 72
End: 2022-08-03
Payer: MEDICARE

## 2022-08-03 DIAGNOSIS — R33.9 URINARY RETENTION: ICD-10-CM

## 2022-08-03 DIAGNOSIS — R35.1 BENIGN PROSTATIC HYPERPLASIA WITH NOCTURIA: Primary | ICD-10-CM

## 2022-08-03 DIAGNOSIS — N40.1 BENIGN PROSTATIC HYPERPLASIA WITH NOCTURIA: Primary | ICD-10-CM

## 2022-08-03 DIAGNOSIS — N32.3 BLADDER DIVERTICULUM: ICD-10-CM

## 2022-08-03 DIAGNOSIS — D50.9 IRON DEFICIENCY ANEMIA, UNSPECIFIED IRON DEFICIENCY ANEMIA TYPE: ICD-10-CM

## 2022-08-03 PROCEDURE — 99213 OFFICE O/P EST LOW 20 MIN: CPT | Performed by: NURSE PRACTITIONER

## 2022-08-03 RX ORDER — TAMSULOSIN HYDROCHLORIDE 0.4 MG/1
0.4 CAPSULE ORAL DAILY
Qty: 90 CAPSULE | Refills: 3 | Status: SHIPPED | OUTPATIENT
Start: 2022-08-03 | End: 2023-07-29

## 2022-08-03 RX ORDER — LIDOCAINE HYDROCHLORIDE 20 MG/ML
SOLUTION ORAL; TOPICAL
Qty: 90 TABLET | Refills: 0 | Status: SHIPPED | OUTPATIENT
Start: 2022-08-03

## 2022-08-03 RX ORDER — FINASTERIDE 5 MG/1
5 TABLET, FILM COATED ORAL DAILY
Qty: 90 TABLET | Refills: 3 | Status: SHIPPED | OUTPATIENT
Start: 2022-08-03

## 2022-08-10 ENCOUNTER — TELEPHONE (OUTPATIENT)
Dept: FAMILY MEDICINE CLINIC | Facility: CLINIC | Age: 72
End: 2022-08-10

## 2022-08-10 ENCOUNTER — OFFICE VISIT (OUTPATIENT)
Dept: FAMILY MEDICINE CLINIC | Facility: CLINIC | Age: 72
End: 2022-08-10
Payer: MEDICARE

## 2022-08-10 ENCOUNTER — HOSPITAL ENCOUNTER (OUTPATIENT)
Dept: GENERAL RADIOLOGY | Age: 72
Discharge: HOME | End: 2022-08-10
Attending: FAMILY MEDICINE
Payer: MEDICARE

## 2022-08-10 VITALS
TEMPERATURE: 98 F | WEIGHT: 126 LBS | HEIGHT: 64 IN | DIASTOLIC BLOOD PRESSURE: 70 MMHG | SYSTOLIC BLOOD PRESSURE: 111 MMHG | HEART RATE: 81 BPM | BODY MASS INDEX: 21.51 KG/M2

## 2022-08-10 DIAGNOSIS — R06.02 SHORTNESS OF BREATH: ICD-10-CM

## 2022-08-10 DIAGNOSIS — H53.8 BLURRED VISION: Primary | ICD-10-CM

## 2022-08-10 DIAGNOSIS — R05.9 COUGH, UNSPECIFIED TYPE: ICD-10-CM

## 2022-08-10 DIAGNOSIS — J84.9 INTERSTITIAL LUNG DISEASE (HCC): ICD-10-CM

## 2022-08-10 DIAGNOSIS — H53.8 BLURRY VISION, RIGHT EYE: Primary | ICD-10-CM

## 2022-08-10 DIAGNOSIS — J84.10 PULMONARY FIBROSIS (HCC): ICD-10-CM

## 2022-08-10 DIAGNOSIS — D50.9 IRON DEFICIENCY ANEMIA, UNSPECIFIED IRON DEFICIENCY ANEMIA TYPE: ICD-10-CM

## 2022-08-10 DIAGNOSIS — E11.9 DIABETES MELLITUS TYPE 2 WITHOUT RETINOPATHY (HCC): ICD-10-CM

## 2022-08-10 DIAGNOSIS — E78.00 HYPERCHOLESTEREMIA: ICD-10-CM

## 2022-08-10 PROCEDURE — 71046 X-RAY EXAM CHEST 2 VIEWS: CPT | Performed by: FAMILY MEDICINE

## 2022-08-10 PROCEDURE — 99214 OFFICE O/P EST MOD 30 MIN: CPT | Performed by: FAMILY MEDICINE

## 2022-08-10 PROCEDURE — 3061F NEG MICROALBUMINURIA REV: CPT

## 2022-08-10 PROCEDURE — 3044F HG A1C LEVEL LT 7.0%: CPT

## 2022-08-10 NOTE — TELEPHONE ENCOUNTER
Patient was seen today, needs referral for oph. Dr. Feng Lisa. Pended for signature. Please sign off if appropriate.

## 2022-08-11 LAB
% SATURATION: 37 % (CALC) (ref 20–48)
ABSOLUTE BASOPHILS: 59 CELLS/UL (ref 0–200)
ABSOLUTE EOSINOPHILS: 330 CELLS/UL (ref 15–500)
ABSOLUTE LYMPHOCYTES: 2138 CELLS/UL (ref 850–3900)
ABSOLUTE MONOCYTES: 614 CELLS/UL (ref 200–950)
ABSOLUTE NEUTROPHILS: 3458 CELLS/UL (ref 1500–7800)
ALBUMIN/GLOBULIN RATIO: 1.7 (CALC) (ref 1–2.5)
ALBUMIN: 4.3 G/DL (ref 3.6–5.1)
ALKALINE PHOSPHATASE: 82 U/L (ref 35–144)
ALT: 13 U/L (ref 9–46)
AST: 17 U/L (ref 10–35)
BASOPHILS: 0.9 %
BILIRUBIN, TOTAL: 0.4 MG/DL (ref 0.2–1.2)
BUN: 9 MG/DL (ref 7–25)
CALCIUM: 9.6 MG/DL (ref 8.6–10.3)
CARBON DIOXIDE: 26 MMOL/L (ref 20–32)
CHLORIDE: 105 MMOL/L (ref 98–110)
CREATININE, RANDOM URINE: 59 MG/DL (ref 20–320)
CREATININE: 0.92 MG/DL (ref 0.7–1.35)
EGFR: 90 ML/MIN/1.73M2
EOSINOPHILS: 5 %
GLOBULIN: 2.5 G/DL (CALC) (ref 1.9–3.7)
GLUCOSE: 92 MG/DL (ref 65–139)
HEMATOCRIT: 36.2 % (ref 38.5–50)
HEMOGLOBIN A1C: 6.9 % OF TOTAL HGB
HEMOGLOBIN: 12.1 G/DL (ref 13.2–17.1)
IRON BINDING CAPACITY: 385 MCG/DL (CALC) (ref 250–425)
IRON, TOTAL: 143 MCG/DL (ref 50–180)
LYMPHOCYTES: 32.4 %
MCH: 28.7 PG (ref 27–33)
MCHC: 33.4 G/DL (ref 32–36)
MCV: 86 FL (ref 80–100)
MICROALBUMIN/CREATININE RATIO, RANDOM URINE: 8 MCG/MG CREAT
MICROALBUMIN: 0.5 MG/DL
MONOCYTES: 9.3 %
MPV: 10.1 FL (ref 7.5–12.5)
NEUTROPHILS: 52.4 %
PLATELET COUNT: 322 THOUSAND/UL (ref 140–400)
POTASSIUM: 4.5 MMOL/L (ref 3.5–5.3)
PROTEIN, TOTAL: 6.8 G/DL (ref 6.1–8.1)
RDW: 13 % (ref 11–15)
RED BLOOD CELL COUNT: 4.21 MILLION/UL (ref 4.2–5.8)
SED RATE BY MODIFIED$WESTERGREN: 9 MM/H
SODIUM: 137 MMOL/L (ref 135–146)
WHITE BLOOD CELL COUNT: 6.6 THOUSAND/UL (ref 3.8–10.8)

## 2022-08-15 NOTE — TELEPHONE ENCOUNTER
Authorized, Sencha message sent and faxed to specialist      Thank you,  [facility]   Referral specialist

## 2022-08-26 ENCOUNTER — TELEPHONE (OUTPATIENT)
Dept: FAMILY MEDICINE CLINIC | Facility: CLINIC | Age: 72
End: 2022-08-26

## 2022-08-28 ENCOUNTER — LAB ENCOUNTER (OUTPATIENT)
Dept: LAB | Facility: HOSPITAL | Age: 72
End: 2022-08-28
Attending: INTERNAL MEDICINE
Payer: MEDICARE

## 2022-08-28 DIAGNOSIS — Z01.818 PRE-OP TESTING: ICD-10-CM

## 2022-08-28 LAB — SARS-COV-2 RNA RESP QL NAA+PROBE: NOT DETECTED

## 2022-08-28 NOTE — TELEPHONE ENCOUNTER
Hold metformin day prior, when he takes prep and day of procedure.   Resume metformin next day of procedure

## 2022-08-30 ENCOUNTER — TELEPHONE (OUTPATIENT)
Dept: GASTROENTEROLOGY | Facility: CLINIC | Age: 72
End: 2022-08-30

## 2022-08-30 RX ORDER — POLYETHYLENE GLYCOL 3350, SODIUM CHLORIDE, SODIUM BICARBONATE, POTASSIUM CHLORIDE 420; 11.2; 5.72; 1.48 G/4L; G/4L; G/4L; G/4L
POWDER, FOR SOLUTION ORAL
Qty: 4000 ML | Refills: 0 | Status: SHIPPED | OUTPATIENT
Start: 2022-08-30

## 2022-08-30 NOTE — TELEPHONE ENCOUNTER
I was paged again by the patient that he needed his prescription. I called again. Again no answer. I called the pharmacy to where I sent his prescription. I spoke with staff who confirmed they received the prescription and it would be ready in 10-15 minutes. I confirmed the same phone number for the patient they had on file. I asked if they could call and let him know the prescription was ready. I then called the patient again. Again no answer.  I left a message letting him know that I called the pharmacy and sent in the prescription and should be ready shortly    Abiodun Arellano MD  [facility], [facility] - Gastroenterology  8/30/2022  6:29 PM

## 2022-08-30 NOTE — TELEPHONE ENCOUNTER
On Call Note:    Received page that his pharmacy didn't his med for his procedure tomorrow. Called patient. No answer.  Left message I'm sending his medication    Tara Campos MD  [address] [place] - Gastroenterology  8/30/2022  5:53 PM

## 2022-08-30 NOTE — TELEPHONE ENCOUNTER
I was again paged by the patient. I called him. He seemed confused. He still did not  the medication from the pharmacy. I discussed with him that he needs to  the medication from the pharamcy which is 4L and drink it starting as soon as possible. Discussed the goal is to be passing clear/yellow tinged liquid. He says his procedure is at 7:45 AM tomorrow. He says his prep instructions are for a split prep. Discussed then he would have to drink 2L now and 2L around 2 AM. He says he doesn't think he can do that. I encouraged him to drink then all of the fluid this evening.  He said he will try    Robina Maddox MD  [facility], [facility] - Gastroenterology  8/30/2022  7:02 PM

## 2022-08-31 ENCOUNTER — TELEPHONE (OUTPATIENT)
Dept: GASTROENTEROLOGY | Facility: CLINIC | Age: 72
End: 2022-08-31

## 2022-08-31 ENCOUNTER — TELEPHONE (OUTPATIENT)
Dept: FAMILY MEDICINE CLINIC | Facility: CLINIC | Age: 72
End: 2022-08-31

## 2022-08-31 NOTE — TELEPHONE ENCOUNTER
Call from patient, verified name/. He saw Dr Trevin Lyn 2022. He states their billing office called him (number is [phone]) and asked him to have our office contact his insurance and give Dr Anette Plascencia tax ID number (which he states is [id number]. He needs this done for upcoming eye surgery. Routed to [facility] for assistance.

## 2022-08-31 NOTE — TELEPHONE ENCOUNTER
Pt left message with answering service last night.      Message # [phone]         2022 07:57p   [OWEN]  Primary MD:  Criselda Whitney [facility] [place]  From:  Sinan Montiel  :  [birth year]  Phone#:  [phone]  ----------------------------------------------------------------------  OFFICE LOCATION:   [facility]  @ [facility]

## 2022-08-31 NOTE — TELEPHONE ENCOUNTER
08/31/2022 CBLM to reschedule procedure told patient to ask for Lorenza Yoder when calling in     Lorenza Yoder

## 2022-09-01 NOTE — TELEPHONE ENCOUNTER
Pt call back stated he spoke to [facility] and was told  Referral was done and to call Billing - [facility] is [facility]  , nurse called [facility]- was advised referral ID Tax number should be #[id number]    Routing to [facility]     Pt will f/u with [facility]

## 2022-09-02 NOTE — TELEPHONE ENCOUNTER
[facility] and also had them email me copy of the change. I called the patient to informed of change and also called Chante Camilo with billing left msg regarding the updated tax id.     Thank you    [facility]

## 2022-10-14 ENCOUNTER — TELEPHONE (OUTPATIENT)
Dept: FAMILY MEDICINE CLINIC | Facility: CLINIC | Age: 72
End: 2022-10-14

## 2022-10-14 NOTE — TELEPHONE ENCOUNTER
Spoke with Brianda Cevallos at the surgical office. Patient is ok to have appointment for clearance on Mon 10/17/22. Ok to fax labs from 8/10/22. If new labs at order, if patient can have order to have labs done at [address] [place] [address].    Spoke to patient and scheduled for pre-op clearance on 10/17/22 with Jessa CUEVAS

## 2022-10-14 NOTE — TELEPHONE ENCOUNTER
Jerold Epley, states that the patient is having surgery on 10-18-22 for right eye cataract. Jerold Epley, would like the last office visit notes, clearance and any labs done. Please, fax to [phone].

## 2022-10-17 ENCOUNTER — OFFICE VISIT (OUTPATIENT)
Dept: FAMILY MEDICINE CLINIC | Facility: CLINIC | Age: 72
End: 2022-10-17
Payer: MEDICARE

## 2022-10-17 VITALS
RESPIRATION RATE: 18 BRPM | HEIGHT: 62 IN | TEMPERATURE: 98 F | BODY MASS INDEX: 23.92 KG/M2 | HEART RATE: 87 BPM | WEIGHT: 130 LBS | DIASTOLIC BLOOD PRESSURE: 63 MMHG | SYSTOLIC BLOOD PRESSURE: 127 MMHG | OXYGEN SATURATION: 96 %

## 2022-10-17 DIAGNOSIS — Z01.818 PREOP EXAMINATION: Primary | ICD-10-CM

## 2022-10-17 PROCEDURE — 3074F SYST BP LT 130 MM HG: CPT

## 2022-10-17 PROCEDURE — 99213 OFFICE O/P EST LOW 20 MIN: CPT

## 2022-10-17 PROCEDURE — 3078F DIAST BP <80 MM HG: CPT

## 2022-10-17 PROCEDURE — 3008F BODY MASS INDEX DOCD: CPT

## 2022-11-23 ENCOUNTER — OFFICE VISIT (OUTPATIENT)
Dept: FAMILY MEDICINE CLINIC | Facility: CLINIC | Age: 72
End: 2022-11-23
Payer: MEDICARE

## 2022-11-23 VITALS
DIASTOLIC BLOOD PRESSURE: 63 MMHG | SYSTOLIC BLOOD PRESSURE: 128 MMHG | WEIGHT: 131 LBS | BODY MASS INDEX: 24.11 KG/M2 | HEART RATE: 78 BPM | TEMPERATURE: 98 F | HEIGHT: 62 IN

## 2022-11-23 DIAGNOSIS — J84.10 PULMONARY FIBROSIS (HCC): ICD-10-CM

## 2022-11-23 DIAGNOSIS — Z98.49 STATUS POST CATARACT EXTRACTION, UNSPECIFIED LATERALITY: Primary | ICD-10-CM

## 2022-11-23 DIAGNOSIS — E78.00 HYPERCHOLESTEREMIA: ICD-10-CM

## 2022-11-23 DIAGNOSIS — N48.89 PENILE IRRITATION: ICD-10-CM

## 2022-11-23 DIAGNOSIS — I10 ESSENTIAL HYPERTENSION: ICD-10-CM

## 2022-11-23 DIAGNOSIS — E11.9 DIABETES MELLITUS TYPE 2 WITHOUT RETINOPATHY (HCC): ICD-10-CM

## 2022-11-23 DIAGNOSIS — Z23 NEED FOR INFLUENZA VACCINATION: ICD-10-CM

## 2022-11-23 DIAGNOSIS — R05.9 COUGH, UNSPECIFIED TYPE: ICD-10-CM

## 2022-11-23 DIAGNOSIS — D64.9 MILD ANEMIA: ICD-10-CM

## 2022-11-23 PROCEDURE — 90662 IIV NO PRSV INCREASED AG IM: CPT | Performed by: FAMILY MEDICINE

## 2022-11-23 PROCEDURE — 3074F SYST BP LT 130 MM HG: CPT | Performed by: FAMILY MEDICINE

## 2022-11-23 PROCEDURE — 3078F DIAST BP <80 MM HG: CPT | Performed by: FAMILY MEDICINE

## 2022-11-23 PROCEDURE — 99214 OFFICE O/P EST MOD 30 MIN: CPT | Performed by: FAMILY MEDICINE

## 2022-11-23 PROCEDURE — 3008F BODY MASS INDEX DOCD: CPT | Performed by: FAMILY MEDICINE

## 2022-11-23 PROCEDURE — G0008 ADMIN INFLUENZA VIRUS VAC: HCPCS | Performed by: FAMILY MEDICINE

## 2022-11-23 RX ORDER — ALBUTEROL SULFATE 90 UG/1
2 AEROSOL, METERED RESPIRATORY (INHALATION) EVERY 6 HOURS PRN
Qty: 3 EACH | Refills: 3 | Status: SHIPPED | OUTPATIENT
Start: 2022-11-23

## 2022-11-25 PROCEDURE — 3044F HG A1C LEVEL LT 7.0%: CPT | Performed by: FAMILY MEDICINE

## 2022-11-26 LAB
ABSOLUTE BASOPHILS: 48 CELLS/UL (ref 0–200)
ABSOLUTE EOSINOPHILS: 311 CELLS/UL (ref 15–500)
ABSOLUTE LYMPHOCYTES: 2270 CELLS/UL (ref 850–3900)
ABSOLUTE MONOCYTES: 531 CELLS/UL (ref 200–950)
ABSOLUTE NEUTROPHILS: 3740 CELLS/UL (ref 1500–7800)
ALBUMIN/GLOBULIN RATIO: 1.6 (CALC) (ref 1–2.5)
ALBUMIN: 4.4 G/DL (ref 3.6–5.1)
ALKALINE PHOSPHATASE: 85 U/L (ref 35–144)
ALT: 16 U/L (ref 9–46)
AST: 20 U/L (ref 10–35)
BASOPHILS: 0.7 %
BILIRUBIN, TOTAL: 0.4 MG/DL (ref 0.2–1.2)
BUN: 8 MG/DL (ref 7–25)
CALCIUM: 9.6 MG/DL (ref 8.6–10.3)
CARBON DIOXIDE: 27 MMOL/L (ref 20–32)
CHLORIDE: 103 MMOL/L (ref 98–110)
CHOL/HDLC RATIO: 2.7 (CALC)
CHOLESTEROL, TOTAL: 137 MG/DL
CREATININE: 0.9 MG/DL (ref 0.7–1.28)
EGFR: 92 ML/MIN/1.73M2
EOSINOPHILS: 4.5 %
GLOBULIN: 2.7 G/DL (CALC) (ref 1.9–3.7)
GLUCOSE: 135 MG/DL (ref 65–99)
HDL CHOLESTEROL: 50 MG/DL
HEMATOCRIT: 38 % (ref 38.5–50)
HEMOGLOBIN A1C: 6.5 % OF TOTAL HGB
HEMOGLOBIN: 12.7 G/DL (ref 13.2–17.1)
LDL-CHOLESTEROL: 69 MG/DL (CALC)
LYMPHOCYTES: 32.9 %
MCH: 29.7 PG (ref 27–33)
MCHC: 33.4 G/DL (ref 32–36)
MCV: 89 FL (ref 80–100)
MONOCYTES: 7.7 %
MPV: 9.8 FL (ref 7.5–12.5)
NEUTROPHILS: 54.2 %
NON-HDL CHOLESTEROL: 87 MG/DL (CALC)
PLATELET COUNT: 355 THOUSAND/UL (ref 140–400)
POTASSIUM: 4.6 MMOL/L (ref 3.5–5.3)
PROTEIN, TOTAL: 7.1 G/DL (ref 6.1–8.1)
RDW: 12.1 % (ref 11–15)
RED BLOOD CELL COUNT: 4.27 MILLION/UL (ref 4.2–5.8)
SODIUM: 139 MMOL/L (ref 135–146)
TRIGLYCERIDES: 98 MG/DL
WHITE BLOOD CELL COUNT: 6.9 THOUSAND/UL (ref 3.8–10.8)

## 2022-12-07 ENCOUNTER — OFFICE VISIT (OUTPATIENT)
Dept: SURGERY | Facility: CLINIC | Age: 72
End: 2022-12-07
Payer: MEDICARE

## 2022-12-07 DIAGNOSIS — N32.3 BLADDER DIVERTICULUM: ICD-10-CM

## 2022-12-07 DIAGNOSIS — Z12.5 SCREENING PSA (PROSTATE SPECIFIC ANTIGEN): ICD-10-CM

## 2022-12-07 DIAGNOSIS — N40.1 BENIGN PROSTATIC HYPERPLASIA WITH LOWER URINARY TRACT SYMPTOMS, SYMPTOM DETAILS UNSPECIFIED: Primary | ICD-10-CM

## 2022-12-07 DIAGNOSIS — R33.9 URINARY RETENTION: ICD-10-CM

## 2022-12-07 PROCEDURE — 99213 OFFICE O/P EST LOW 20 MIN: CPT | Performed by: NURSE PRACTITIONER

## 2022-12-07 PROCEDURE — 51798 US URINE CAPACITY MEASURE: CPT | Performed by: NURSE PRACTITIONER

## 2022-12-10 DIAGNOSIS — E11.9 DIABETES MELLITUS TYPE 2 WITHOUT RETINOPATHY (HCC): ICD-10-CM

## 2022-12-11 RX ORDER — OMEPRAZOLE 40 MG/1
40 CAPSULE, DELAYED RELEASE ORAL DAILY
Qty: 90 CAPSULE | Refills: 1 | Status: SHIPPED | OUTPATIENT
Start: 2022-12-11

## 2022-12-11 NOTE — TELEPHONE ENCOUNTER
Pt to confirm how he is taking Metformin. [employer] message sent. Requested Prescriptions   Pending Prescriptions Disp Refills    METFORMIN HCL 1000 MG Oral Tab [Pharmacy Med Name: METFORMIN HYDROCHLORIDE 1000 MG Tablet] 180 tablet 1     Sig: Take 1 tablet (1,000 mg total) by mouth 2 (two) times daily with meals.        Diabetes Medication Protocol Passed - 12/10/2022 11:37 AM        Passed - Last A1C < 7.5 and within past 6 months     Lab Results   Component Value Date    A1C 6.5 (H) 11/25/2022             Passed - In person appointment or virtual visit in the past 6 mos or appointment in next 3 mos     Recent Outpatient Visits              4 days ago Benign prostatic hyperplasia with lower urinary tract symptoms, symptom details unspecified    [facility], [address], FAITH Rangel    Office Visit    2 weeks ago Status post cataract extraction, unspecified laterality    [facility], [address]    Office Visit    1 month ago Preop examination    [facility], [address] [place], APRN    Office Visit    4 months ago Jose vision, right eye    Kassie Beltran MD    Office Visit    4 months ago Benign prostatic hyperplasia with nocturia    [facility] [address], FAITH Rangel    Office Visit          Future Appointments         Provider Department Appt Notes    In 3 days Aimee Parks MD [facility], [facility] follow up - diabtes                Passed - EGFRCR or GFRNAA > 50     GFR Evaluation  GFRNAA: 95 , resulted on 4/27/2022          Passed - GFR in the past 12 months          OMEPRAZOLE 40 MG Oral Capsule Delayed Release [Pharmacy Med Name: OMEPRAZOLE 40 MG Capsule Delayed Release] 90 capsule 2     Sig: TAKE 1 CAPSULE EVERY DAY       Gastrointestional Medication Protocol Passed - 12/10/2022 11:37 AM Passed - In person appointment or virtual visit in the past 12 mos or appointment in next 3 mos     Recent Outpatient Visits              4 days ago Benign prostatic hyperplasia with lower urinary tract symptoms, symptom details unspecified    [facility], [address], FAITH Neal    Office Visit    2 weeks ago Status post cataract extraction, unspecified laterality    [facility], [facility], [address] MD    Office Visit    1 month ago Preop examination    [facility], [address] [place], FAITH    Office Visit    4 months ago Jose vision, right eye    [facility], [address] MD    Office Visit    4 months ago Benign prostatic hyperplasia with nocturia    [facility], [address], [employer], [place], [place]QU[place]    Office Visit          Future Appointments         Provider Department Appt Notes    In 3 days Gia Amaya MD [facility], [facility] follow up - diabtes                     Recent Outpatient Visits              4 days ago Benign prostatic hyperplasia with lower urinary tract symptoms, symptom details unspecified    [facility], [address], [employer], [place], [place]    Office Visit    2 weeks ago Status post cataract extraction, unspecified laterality    Rambo Christianson MD    Office Visit    1 month ago Preop examination    [facility], [address] [place], [place]    Office Visit    4 months ago Jose vision, right eye    Rambo Christianson MD    Office Visit    4 months ago Benign prostatic hyperplasia with nocturia    [facility], [address], [employer], [place], APRN    Office Visit            Future Appointments         Provider Department Appt Notes In 3 days Jair Colorado MD [facility], [facility]

## 2022-12-12 NOTE — TELEPHONE ENCOUNTER
First Call attempt. Left Voicemail to call back our office. Office phone number provided with office hours. Has not checked his [employer].

## 2022-12-12 NOTE — TELEPHONE ENCOUNTER
Per recent last visit:      metFORMIN HCl 1000 MG Oral Tab Take 1 tablet (1,000 mg total) by mouth 2 (two) times daily with meals.  (Patient taking differently: Take 1,000 mg by mouth daily with breakfast.)

## 2022-12-14 ENCOUNTER — OFFICE VISIT (OUTPATIENT)
Dept: FAMILY MEDICINE CLINIC | Facility: CLINIC | Age: 72
End: 2022-12-14
Payer: MEDICARE

## 2022-12-14 VITALS
SYSTOLIC BLOOD PRESSURE: 126 MMHG | DIASTOLIC BLOOD PRESSURE: 70 MMHG | WEIGHT: 128 LBS | HEIGHT: 62 IN | BODY MASS INDEX: 23.55 KG/M2 | OXYGEN SATURATION: 95 % | HEART RATE: 75 BPM

## 2022-12-14 DIAGNOSIS — E11.9 DIABETES MELLITUS TYPE 2 WITHOUT RETINOPATHY (HCC): Primary | ICD-10-CM

## 2022-12-14 DIAGNOSIS — E78.00 HYPERCHOLESTEREMIA: ICD-10-CM

## 2022-12-14 DIAGNOSIS — D50.9 IRON DEFICIENCY ANEMIA, UNSPECIFIED IRON DEFICIENCY ANEMIA TYPE: ICD-10-CM

## 2022-12-14 DIAGNOSIS — I10 ESSENTIAL HYPERTENSION: ICD-10-CM

## 2022-12-14 DIAGNOSIS — R21 RASH OF PENIS: ICD-10-CM

## 2022-12-14 PROCEDURE — 3074F SYST BP LT 130 MM HG: CPT | Performed by: FAMILY MEDICINE

## 2022-12-14 PROCEDURE — 3008F BODY MASS INDEX DOCD: CPT | Performed by: FAMILY MEDICINE

## 2022-12-14 PROCEDURE — 99214 OFFICE O/P EST MOD 30 MIN: CPT | Performed by: FAMILY MEDICINE

## 2022-12-14 PROCEDURE — 3078F DIAST BP <80 MM HG: CPT | Performed by: FAMILY MEDICINE

## 2022-12-14 RX ORDER — FERROUS SULFATE 325(65) MG
1 TABLET ORAL
Qty: 90 TABLET | Refills: 1 | Status: SHIPPED | OUTPATIENT
Start: 2022-12-14

## 2022-12-14 RX ORDER — NYSTATIN 100000 U/G
1 CREAM TOPICAL 2 TIMES DAILY
Qty: 30 G | Refills: 1 | Status: SHIPPED | OUTPATIENT
Start: 2022-12-14

## 2022-12-14 RX ORDER — ATORVASTATIN CALCIUM 20 MG/1
20 TABLET, FILM COATED ORAL EVERY EVENING
Qty: 90 TABLET | Refills: 3 | Status: SHIPPED | OUTPATIENT
Start: 2022-12-14

## 2023-01-04 ENCOUNTER — APPOINTMENT (OUTPATIENT)
Dept: FAMILY MEDICINE | Age: 73
End: 2023-01-04

## 2023-03-15 ENCOUNTER — OFFICE VISIT (OUTPATIENT)
Dept: FAMILY MEDICINE | Age: 73
End: 2023-03-15

## 2023-03-15 ENCOUNTER — HOSPITAL ENCOUNTER (OUTPATIENT)
Dept: GENERAL RADIOLOGY | Age: 73
Discharge: HOME | End: 2023-03-15
Attending: FAMILY MEDICINE

## 2023-03-15 VITALS
RESPIRATION RATE: 16 BRPM | WEIGHT: 123 LBS | OXYGEN SATURATION: 95 % | BODY MASS INDEX: 21.79 KG/M2 | DIASTOLIC BLOOD PRESSURE: 60 MMHG | SYSTOLIC BLOOD PRESSURE: 120 MMHG | HEIGHT: 63 IN | HEART RATE: 86 BPM | TEMPERATURE: 97.5 F

## 2023-03-15 DIAGNOSIS — R06.02 SHORTNESS OF BREATH: Primary | ICD-10-CM

## 2023-03-15 DIAGNOSIS — E11.69 TYPE 2 DIABETES MELLITUS WITH OTHER SPECIFIED COMPLICATION, WITHOUT LONG-TERM CURRENT USE OF INSULIN (CMD): ICD-10-CM

## 2023-03-15 DIAGNOSIS — R06.02 SHORTNESS OF BREATH: ICD-10-CM

## 2023-03-15 LAB
HBA1C MFR BLD: 6.7 % (ref 4.5–5.6)
NT-PROBNP SERPL-MCNC: 53 PG/ML

## 2023-03-15 PROCEDURE — 99204 OFFICE O/P NEW MOD 45 MIN: CPT | Performed by: FAMILY MEDICINE

## 2023-03-15 PROCEDURE — 94640 AIRWAY INHALATION TREATMENT: CPT | Performed by: FAMILY MEDICINE

## 2023-03-15 PROCEDURE — 85025 COMPLETE CBC W/AUTO DIFF WBC: CPT | Performed by: INTERNAL MEDICINE

## 2023-03-15 PROCEDURE — 80053 COMPREHEN METABOLIC PANEL: CPT | Performed by: INTERNAL MEDICINE

## 2023-03-15 PROCEDURE — 82570 ASSAY OF URINE CREATININE: CPT | Performed by: INTERNAL MEDICINE

## 2023-03-15 PROCEDURE — 36415 COLL VENOUS BLD VENIPUNCTURE: CPT | Performed by: INTERNAL MEDICINE

## 2023-03-15 PROCEDURE — 80061 LIPID PANEL: CPT | Performed by: INTERNAL MEDICINE

## 2023-03-15 PROCEDURE — 82043 UR ALBUMIN QUANTITATIVE: CPT | Performed by: INTERNAL MEDICINE

## 2023-03-15 PROCEDURE — 71046 X-RAY EXAM CHEST 2 VIEWS: CPT

## 2023-03-15 PROCEDURE — 3074F SYST BP LT 130 MM HG: CPT | Performed by: FAMILY MEDICINE

## 2023-03-15 PROCEDURE — 3078F DIAST BP <80 MM HG: CPT | Performed by: FAMILY MEDICINE

## 2023-03-15 PROCEDURE — 83880 ASSAY OF NATRIURETIC PEPTIDE: CPT | Performed by: INTERNAL MEDICINE

## 2023-03-15 PROCEDURE — 83036 HEMOGLOBIN GLYCOSYLATED A1C: CPT | Performed by: FAMILY MEDICINE

## 2023-03-15 RX ORDER — OMEPRAZOLE 40 MG/1
40 CAPSULE, DELAYED RELEASE ORAL
COMMUNITY
Start: 2022-12-11 | End: 2023-03-21 | Stop reason: SDUPTHER

## 2023-03-15 RX ORDER — TAMSULOSIN HYDROCHLORIDE 0.4 MG/1
0.4 CAPSULE ORAL
COMMUNITY
Start: 2022-08-03 | End: 2023-05-05 | Stop reason: SDUPTHER

## 2023-03-15 RX ORDER — PREDNISONE 20 MG/1
40 TABLET ORAL DAILY
Qty: 10 TABLET | Refills: 0 | Status: SHIPPED | OUTPATIENT
Start: 2023-03-15 | End: 2023-03-20

## 2023-03-15 RX ORDER — NYSTATIN 100000 U/G
CREAM TOPICAL 2 TIMES DAILY
COMMUNITY
Start: 2022-12-14 | End: 2023-08-07 | Stop reason: ALTCHOICE

## 2023-03-15 RX ORDER — AZITHROMYCIN 250 MG/1
TABLET, FILM COATED ORAL
Qty: 6 TABLET | Refills: 0 | Status: SHIPPED | OUTPATIENT
Start: 2023-03-15 | End: 2023-03-20

## 2023-03-15 RX ORDER — ALBUTEROL SULFATE 2.5 MG/3ML
2.5 SOLUTION RESPIRATORY (INHALATION) ONCE
Status: COMPLETED | OUTPATIENT
Start: 2023-03-15 | End: 2023-03-15

## 2023-03-15 RX ORDER — ASPIRIN 81 MG/1
81 TABLET ORAL DAILY
COMMUNITY

## 2023-03-15 RX ORDER — FERROUS SULFATE 325(65) MG
325 TABLET ORAL
COMMUNITY
Start: 2022-12-14 | End: 2023-05-05 | Stop reason: SDUPTHER

## 2023-03-15 RX ORDER — ATORVASTATIN CALCIUM 20 MG/1
20 TABLET, FILM COATED ORAL
COMMUNITY
Start: 2022-12-14 | End: 2023-04-26 | Stop reason: SDUPTHER

## 2023-03-15 RX ADMIN — ALBUTEROL SULFATE 2.5 MG: 2.5 SOLUTION RESPIRATORY (INHALATION) at 14:48

## 2023-03-15 ASSESSMENT — PATIENT HEALTH QUESTIONNAIRE - PHQ9
SUM OF ALL RESPONSES TO PHQ9 QUESTIONS 1 AND 2: 0
2. FEELING DOWN, DEPRESSED OR HOPELESS: NOT AT ALL
CLINICAL INTERPRETATION OF PHQ2 SCORE: NO FURTHER SCREENING NEEDED
1. LITTLE INTEREST OR PLEASURE IN DOING THINGS: NOT AT ALL
SUM OF ALL RESPONSES TO PHQ9 QUESTIONS 1 AND 2: 0

## 2023-03-15 ASSESSMENT — ENCOUNTER SYMPTOMS
SHORTNESS OF BREATH: 1
GASTROINTESTINAL NEGATIVE: 1
PSYCHIATRIC NEGATIVE: 1
WHEEZING: 0
ORTHOPNEA: 0
SYNCOPE: 0
SNORING: 0
CONSTITUTIONAL NEGATIVE: 1
SPUTUM PRODUCTION: 0
COUGH: 0
NEAR-SYNCOPE: 0

## 2023-03-16 ENCOUNTER — EXTERNAL RECORD (OUTPATIENT)
Dept: HEALTH INFORMATION MANAGEMENT | Facility: OTHER | Age: 73
End: 2023-03-16

## 2023-03-16 LAB
ALBUMIN SERPL-MCNC: 4 G/DL (ref 3.6–5.1)
ALBUMIN/GLOB SERPL: 1.3 {RATIO} (ref 1–2.4)
ALP SERPL-CCNC: 123 UNITS/L (ref 45–117)
ALT SERPL-CCNC: 27 UNITS/L
ANION GAP SERPL CALC-SCNC: 9 MMOL/L (ref 7–19)
AST SERPL-CCNC: 22 UNITS/L
BASOPHILS # BLD: 0.1 K/MCL (ref 0–0.3)
BASOPHILS NFR BLD: 1 %
BILIRUB SERPL-MCNC: 0.7 MG/DL (ref 0.2–1)
BUN SERPL-MCNC: 9 MG/DL (ref 6–20)
BUN/CREAT SERPL: 13 (ref 7–25)
CALCIUM SERPL-MCNC: 9 MG/DL (ref 8.4–10.2)
CHLORIDE SERPL-SCNC: 106 MMOL/L (ref 97–110)
CHOLEST SERPL-MCNC: 132 MG/DL
CHOLEST/HDLC SERPL: 3.1 {RATIO}
CO2 SERPL-SCNC: 24 MMOL/L (ref 21–32)
CREAT SERPL-MCNC: 0.71 MG/DL (ref 0.67–1.17)
CREAT UR-MCNC: 104 MG/DL
DEPRECATED RDW RBC: 48.9 FL (ref 39–50)
EOSINOPHIL # BLD: 0.5 K/MCL (ref 0–0.5)
EOSINOPHIL NFR BLD: 6 %
ERYTHROCYTE [DISTWIDTH] IN BLOOD: 14.5 % (ref 11–15)
FASTING DURATION TIME PATIENT: ABNORMAL H
FASTING DURATION TIME PATIENT: ABNORMAL H
GFR SERPLBLD BASED ON 1.73 SQ M-ARVRAT: >90 ML/MIN
GLOBULIN SER-MCNC: 3.2 G/DL (ref 2–4)
GLUCOSE SERPL-MCNC: 90 MG/DL (ref 70–99)
HCT VFR BLD CALC: 41.8 % (ref 39–51)
HDLC SERPL-MCNC: 43 MG/DL
HGB BLD-MCNC: 12.9 G/DL (ref 13–17)
IMM GRANULOCYTES # BLD AUTO: 0 K/MCL (ref 0–0.2)
IMM GRANULOCYTES # BLD: 0 %
LDLC SERPL CALC-MCNC: 58 MG/DL
LYMPHOCYTES # BLD: 2 K/MCL (ref 1–4)
LYMPHOCYTES NFR BLD: 23 %
MCH RBC QN AUTO: 29.2 PG (ref 26–34)
MCHC RBC AUTO-ENTMCNC: 30.9 G/DL (ref 32–36.5)
MCV RBC AUTO: 94.6 FL (ref 78–100)
MICROALBUMIN UR-MCNC: 0.97 MG/DL
MICROALBUMIN/CREAT UR: 9.3 MG/G
MONOCYTES # BLD: 0.6 K/MCL (ref 0.3–0.9)
MONOCYTES NFR BLD: 7 %
NEUTROPHILS # BLD: 5.2 K/MCL (ref 1.8–7.7)
NEUTROPHILS NFR BLD: 63 %
NONHDLC SERPL-MCNC: 89 MG/DL
NRBC BLD MANUAL-RTO: 0 /100 WBC
PLATELET # BLD AUTO: 512 K/MCL (ref 140–450)
POTASSIUM SERPL-SCNC: 4.3 MMOL/L (ref 3.4–5.1)
PROT SERPL-MCNC: 7.2 G/DL (ref 6.4–8.2)
RBC # BLD: 4.42 MIL/MCL (ref 4.5–5.9)
SODIUM SERPL-SCNC: 135 MMOL/L (ref 135–145)
TRIGL SERPL-MCNC: 157 MG/DL
WBC # BLD: 8.4 K/MCL (ref 4.2–11)

## 2023-03-17 ENCOUNTER — APPOINTMENT (OUTPATIENT)
Dept: FAMILY MEDICINE | Age: 73
End: 2023-03-17

## 2023-03-21 ENCOUNTER — OFFICE VISIT (OUTPATIENT)
Dept: FAMILY MEDICINE | Age: 73
End: 2023-03-21

## 2023-03-21 VITALS
HEART RATE: 76 BPM | HEIGHT: 63 IN | OXYGEN SATURATION: 95 % | WEIGHT: 121 LBS | SYSTOLIC BLOOD PRESSURE: 110 MMHG | RESPIRATION RATE: 16 BRPM | BODY MASS INDEX: 21.44 KG/M2 | TEMPERATURE: 96.6 F | DIASTOLIC BLOOD PRESSURE: 64 MMHG

## 2023-03-21 DIAGNOSIS — J84.9 INTERSTITIAL LUNG DISEASE (CMD): Primary | ICD-10-CM

## 2023-03-21 DIAGNOSIS — R06.02 SHORTNESS OF BREATH: ICD-10-CM

## 2023-03-21 PROCEDURE — 99214 OFFICE O/P EST MOD 30 MIN: CPT | Performed by: FAMILY MEDICINE

## 2023-03-21 RX ORDER — OMEPRAZOLE 40 MG/1
40 CAPSULE, DELAYED RELEASE ORAL DAILY
Qty: 90 CAPSULE | Refills: 0 | Status: SHIPPED | OUTPATIENT
Start: 2023-03-21 | End: 2023-06-12

## 2023-03-21 RX ORDER — PREDNISONE 10 MG/1
10 TABLET ORAL
Qty: 10 TABLET | Refills: 0 | Status: SHIPPED | OUTPATIENT
Start: 2023-03-21 | End: 2023-04-26 | Stop reason: SDUPTHER

## 2023-03-21 ASSESSMENT — ENCOUNTER SYMPTOMS
ORTHOPNEA: 0
PSYCHIATRIC NEGATIVE: 1
NEAR-SYNCOPE: 0
SNORING: 0
SHORTNESS OF BREATH: 1
COUGH: 0
GASTROINTESTINAL NEGATIVE: 1
SYNCOPE: 0
CONSTITUTIONAL NEGATIVE: 1
WHEEZING: 0
SPUTUM PRODUCTION: 0

## 2023-03-22 ENCOUNTER — APPOINTMENT (OUTPATIENT)
Dept: FAMILY MEDICINE | Age: 73
End: 2023-03-22

## 2023-03-24 ENCOUNTER — APPOINTMENT (OUTPATIENT)
Dept: FAMILY MEDICINE | Age: 73
End: 2023-03-24

## 2023-04-07 ENCOUNTER — OFFICE VISIT (OUTPATIENT)
Dept: PULMONOLOGY | Age: 73
End: 2023-04-07

## 2023-04-07 VITALS
SYSTOLIC BLOOD PRESSURE: 99 MMHG | HEIGHT: 64 IN | BODY MASS INDEX: 20.49 KG/M2 | OXYGEN SATURATION: 96 % | HEART RATE: 94 BPM | DIASTOLIC BLOOD PRESSURE: 51 MMHG | WEIGHT: 120 LBS

## 2023-04-07 DIAGNOSIS — R06.02 SHORTNESS OF BREATH: ICD-10-CM

## 2023-04-07 DIAGNOSIS — R05.3 CHRONIC COUGH: ICD-10-CM

## 2023-04-07 DIAGNOSIS — J84.10 PULMONARY FIBROSIS (CMD): ICD-10-CM

## 2023-04-07 DIAGNOSIS — R93.89 ABNORMAL CT SCAN, CHEST: ICD-10-CM

## 2023-04-07 DIAGNOSIS — J84.9 INTERSTITIAL LUNG DISEASE (CMD): Primary | ICD-10-CM

## 2023-04-07 PROCEDURE — 99204 OFFICE O/P NEW MOD 45 MIN: CPT | Performed by: STUDENT IN AN ORGANIZED HEALTH CARE EDUCATION/TRAINING PROGRAM

## 2023-04-08 ENCOUNTER — LAB SERVICES (OUTPATIENT)
Dept: LAB | Age: 73
End: 2023-04-08
Attending: STUDENT IN AN ORGANIZED HEALTH CARE EDUCATION/TRAINING PROGRAM

## 2023-04-08 DIAGNOSIS — J84.10 PULMONARY FIBROSIS (CMD): ICD-10-CM

## 2023-04-08 DIAGNOSIS — J84.9 INTERSTITIAL LUNG DISEASE (CMD): ICD-10-CM

## 2023-04-08 LAB
CRP SERPL-MCNC: <0.3 MG/DL
ERYTHROCYTE [SEDIMENTATION RATE] IN BLOOD BY WESTERGREN METHOD: 37 MM/HR (ref 0–20)
RHEUMATOID FACT SER NEPH-ACNC: <10 UNITS/ML

## 2023-04-08 PROCEDURE — 86431 RHEUMATOID FACTOR QUANT: CPT

## 2023-04-08 PROCEDURE — 86038 ANTINUCLEAR ANTIBODIES: CPT

## 2023-04-08 PROCEDURE — 36415 COLL VENOUS BLD VENIPUNCTURE: CPT

## 2023-04-08 PROCEDURE — 86140 C-REACTIVE PROTEIN: CPT

## 2023-04-08 PROCEDURE — 86036 ANCA SCREEN EACH ANTIBODY: CPT

## 2023-04-08 PROCEDURE — 85652 RBC SED RATE AUTOMATED: CPT

## 2023-04-10 LAB
ANA SER QL IA: NEGATIVE
ANCA AB PATTERN SER IF-IMP: NORMAL
ANCA IGG TITR SER IF: NORMAL {TITER}

## 2023-04-11 ENCOUNTER — TELEPHONE (OUTPATIENT)
Dept: FAMILY MEDICINE CLINIC | Facility: CLINIC | Age: 73
End: 2023-04-11

## 2023-04-13 ENCOUNTER — HOSPITAL ENCOUNTER (OUTPATIENT)
Dept: CT IMAGING | Age: 73
Discharge: HOME | End: 2023-04-13
Attending: STUDENT IN AN ORGANIZED HEALTH CARE EDUCATION/TRAINING PROGRAM

## 2023-04-13 DIAGNOSIS — J84.9 INTERSTITIAL LUNG DISEASE (CMD): ICD-10-CM

## 2023-04-13 DIAGNOSIS — J84.10 PULMONARY FIBROSIS (CMD): ICD-10-CM

## 2023-04-13 PROCEDURE — 71250 CT THORAX DX C-: CPT

## 2023-04-13 PROCEDURE — G1004 CDSM NDSC: HCPCS

## 2023-04-18 ENCOUNTER — TELEPHONE (OUTPATIENT)
Dept: FAMILY MEDICINE | Age: 73
End: 2023-04-18

## 2023-04-18 ENCOUNTER — APPOINTMENT (OUTPATIENT)
Dept: FAMILY MEDICINE | Age: 73
End: 2023-04-18

## 2023-04-24 ENCOUNTER — TELEPHONE (OUTPATIENT)
Dept: FAMILY MEDICINE | Age: 73
End: 2023-04-24

## 2023-04-26 ENCOUNTER — OFFICE VISIT (OUTPATIENT)
Dept: FAMILY MEDICINE | Age: 73
End: 2023-04-26

## 2023-04-26 VITALS
DIASTOLIC BLOOD PRESSURE: 60 MMHG | SYSTOLIC BLOOD PRESSURE: 138 MMHG | WEIGHT: 120 LBS | OXYGEN SATURATION: 94 % | HEIGHT: 64 IN | RESPIRATION RATE: 24 BRPM | TEMPERATURE: 98 F | HEART RATE: 92 BPM | BODY MASS INDEX: 20.49 KG/M2

## 2023-04-26 DIAGNOSIS — R06.02 SHORTNESS OF BREATH: ICD-10-CM

## 2023-04-26 DIAGNOSIS — J84.9 INTERSTITIAL LUNG DISEASE (CMD): Primary | ICD-10-CM

## 2023-04-26 PROCEDURE — 99214 OFFICE O/P EST MOD 30 MIN: CPT | Performed by: FAMILY MEDICINE

## 2023-04-26 RX ORDER — ATORVASTATIN CALCIUM 20 MG/1
20 TABLET, FILM COATED ORAL DAILY
Qty: 90 TABLET | Refills: 1 | Status: SHIPPED | OUTPATIENT
Start: 2023-04-26 | End: 2023-10-18

## 2023-04-26 RX ORDER — FLUTICASONE PROPIONATE AND SALMETEROL 250; 50 UG/1; UG/1
1 POWDER RESPIRATORY (INHALATION)
Qty: 1 EACH | Refills: 1 | Status: SHIPPED | OUTPATIENT
Start: 2023-04-26

## 2023-04-26 RX ORDER — PREDNISONE 10 MG/1
TABLET ORAL
Qty: 15 TABLET | Refills: 0 | Status: SHIPPED | OUTPATIENT
Start: 2023-04-26 | End: 2023-05-05 | Stop reason: DRUGHIGH

## 2023-05-02 ENCOUNTER — APPOINTMENT (OUTPATIENT)
Dept: FAMILY MEDICINE | Age: 73
End: 2023-05-02

## 2023-05-03 ENCOUNTER — HOSPITAL ENCOUNTER (OUTPATIENT)
Dept: RESPIRATORY THERAPY | Age: 73
Discharge: HOME | End: 2023-05-03
Attending: STUDENT IN AN ORGANIZED HEALTH CARE EDUCATION/TRAINING PROGRAM

## 2023-05-03 DIAGNOSIS — J84.10 PULMONARY FIBROSIS (CMD): ICD-10-CM

## 2023-05-03 DIAGNOSIS — J84.9 INTERSTITIAL LUNG DISEASE (CMD): ICD-10-CM

## 2023-05-03 PROCEDURE — 10004180 HB COUNTER-TRANSPORT

## 2023-05-04 DIAGNOSIS — J84.9 INTERSTITIAL LUNG DISEASE (CMD): ICD-10-CM

## 2023-05-04 DIAGNOSIS — R06.02 SHORTNESS OF BREATH: ICD-10-CM

## 2023-05-05 ENCOUNTER — TELEPHONE (OUTPATIENT)
Dept: FAMILY MEDICINE | Age: 73
End: 2023-05-05

## 2023-05-05 RX ORDER — PREDNISONE 5 MG/1
TABLET ORAL
Qty: 30 TABLET | Refills: 0 | Status: SHIPPED | OUTPATIENT
Start: 2023-05-05 | End: 2023-05-22

## 2023-05-05 RX ORDER — PREDNISONE 10 MG/1
TABLET ORAL
Qty: 15 TABLET | Refills: 0 | OUTPATIENT
Start: 2023-05-05 | End: 2023-05-15

## 2023-05-05 RX ORDER — PREDNISONE 20 MG/1
TABLET ORAL
Qty: 10 TABLET | Refills: 0 | OUTPATIENT
Start: 2023-05-05

## 2023-05-08 RX ORDER — TAMSULOSIN HYDROCHLORIDE 0.4 MG/1
0.4 CAPSULE ORAL
Qty: 90 CAPSULE | Refills: 1 | Status: SHIPPED | OUTPATIENT
Start: 2023-05-08 | End: 2023-08-21 | Stop reason: SDUPTHER

## 2023-05-08 RX ORDER — FERROUS SULFATE 325(65) MG
325 TABLET ORAL
Qty: 90 TABLET | Refills: 1 | Status: SHIPPED | OUTPATIENT
Start: 2023-05-08 | End: 2023-08-21 | Stop reason: SDUPTHER

## 2023-05-11 ENCOUNTER — OFFICE VISIT (OUTPATIENT)
Dept: PULMONOLOGY | Age: 73
End: 2023-05-11

## 2023-05-11 DIAGNOSIS — J84.9 INTERSTITIAL LUNG DISEASE (CMD): Primary | ICD-10-CM

## 2023-05-11 PROCEDURE — 99214 OFFICE O/P EST MOD 30 MIN: CPT | Performed by: INTERNAL MEDICINE

## 2023-05-11 RX ORDER — LOPERAMIDE HYDROCHLORIDE 2 MG/1
CAPSULE ORAL
Qty: 60 CAPSULE | Refills: 5 | Status: SHIPPED | OUTPATIENT
Start: 2023-05-11

## 2023-05-15 ENCOUNTER — TELEPHONE (OUTPATIENT)
Dept: PULMONOLOGY | Age: 73
End: 2023-05-15

## 2023-05-15 DIAGNOSIS — J96.11 CHRONIC RESPIRATORY FAILURE WITH HYPOXIA (CMD): Primary | ICD-10-CM

## 2023-05-15 DIAGNOSIS — J84.9 INTERSTITIAL LUNG DISEASE (CMD): ICD-10-CM

## 2023-05-22 RX ORDER — FINASTERIDE 5 MG/1
5 TABLET, FILM COATED ORAL DAILY
COMMUNITY
Start: 2023-05-05 | End: 2023-08-21 | Stop reason: SDUPTHER

## 2023-06-02 ENCOUNTER — OFFICE VISIT (OUTPATIENT)
Dept: FAMILY MEDICINE | Age: 73
End: 2023-06-02

## 2023-06-02 VITALS
OXYGEN SATURATION: 97 % | HEIGHT: 64 IN | DIASTOLIC BLOOD PRESSURE: 50 MMHG | WEIGHT: 120 LBS | BODY MASS INDEX: 20.49 KG/M2 | SYSTOLIC BLOOD PRESSURE: 112 MMHG | RESPIRATION RATE: 28 BRPM | TEMPERATURE: 98 F | HEART RATE: 103 BPM

## 2023-06-02 DIAGNOSIS — J84.9 INTERSTITIAL LUNG DISEASE (CMD): ICD-10-CM

## 2023-06-02 DIAGNOSIS — R06.02 SHORTNESS OF BREATH: ICD-10-CM

## 2023-06-02 DIAGNOSIS — Z01.84 IMMUNITY STATUS TESTING: ICD-10-CM

## 2023-06-02 DIAGNOSIS — E11.69 TYPE 2 DIABETES MELLITUS WITH OTHER SPECIFIED COMPLICATION, WITHOUT LONG-TERM CURRENT USE OF INSULIN (CMD): ICD-10-CM

## 2023-06-02 DIAGNOSIS — N40.1 BENIGN PROSTATIC HYPERPLASIA WITH URINARY FREQUENCY: ICD-10-CM

## 2023-06-02 DIAGNOSIS — Z00.00 MEDICARE ANNUAL WELLNESS VISIT, SUBSEQUENT: Primary | ICD-10-CM

## 2023-06-02 DIAGNOSIS — Z00.00 ENCOUNTER FOR PREVENTIVE CARE: ICD-10-CM

## 2023-06-02 DIAGNOSIS — R01.2 THIRD HEART SOUND (S3): ICD-10-CM

## 2023-06-02 DIAGNOSIS — Z23 NEED FOR VACCINATION: ICD-10-CM

## 2023-06-02 DIAGNOSIS — Z12.11 SCREEN FOR COLON CANCER: ICD-10-CM

## 2023-06-02 DIAGNOSIS — I27.20 PULMONARY HTN (CMD): ICD-10-CM

## 2023-06-02 DIAGNOSIS — R35.0 BENIGN PROSTATIC HYPERPLASIA WITH URINARY FREQUENCY: ICD-10-CM

## 2023-06-02 DIAGNOSIS — Z11.59 NEED FOR HEPATITIS C SCREENING TEST: ICD-10-CM

## 2023-06-02 PROBLEM — I10 ESSENTIAL HYPERTENSION: Status: ACTIVE | Noted: 2018-04-19

## 2023-06-02 PROBLEM — M54.50 CHRONIC BILATERAL LOW BACK PAIN WITHOUT SCIATICA: Status: ACTIVE | Noted: 2021-10-26

## 2023-06-02 PROBLEM — J84.10 PULMONARY FIBROSIS (CMD): Status: ACTIVE | Noted: 2018-04-19

## 2023-06-02 PROBLEM — I45.10 RIGHT BUNDLE BRANCH BLOCK: Status: ACTIVE | Noted: 2018-06-23

## 2023-06-02 PROBLEM — E78.00 HYPERCHOLESTEREMIA: Status: ACTIVE | Noted: 2018-03-19

## 2023-06-02 PROBLEM — K44.9 HIATAL HERNIA: Status: ACTIVE | Noted: 2023-06-02

## 2023-06-02 PROBLEM — R22.2 PLEURAL NODULES: Status: ACTIVE | Noted: 2019-12-02

## 2023-06-02 PROBLEM — E11.9 DIABETES MELLITUS TYPE 2 WITHOUT RETINOPATHY (CMD): Status: ACTIVE | Noted: 2018-04-02

## 2023-06-02 PROBLEM — G89.29 CHRONIC BILATERAL LOW BACK PAIN WITHOUT SCIATICA: Status: ACTIVE | Noted: 2021-10-26

## 2023-06-02 PROBLEM — K21.9 GASTROESOPHAGEAL REFLUX DISEASE WITHOUT ESOPHAGITIS: Status: ACTIVE | Noted: 2019-07-29

## 2023-06-02 PROBLEM — E53.8 VITAMIN B12 DEFICIENCY: Status: ACTIVE | Noted: 2021-07-30

## 2023-06-02 PROBLEM — R05.3 CHRONIC COUGH: Status: ACTIVE | Noted: 2019-09-23

## 2023-06-02 PROBLEM — I25.10 CORONARY ARTERY CALCIFICATION SEEN ON CAT SCAN: Status: ACTIVE | Noted: 2018-06-21

## 2023-06-02 PROBLEM — R91.1 NODULE OF MIDDLE LOBE OF RIGHT LUNG: Status: ACTIVE | Noted: 2022-04-06

## 2023-06-02 PROBLEM — H90.A31 MIXED CONDUCTIVE AND SENSORINEURAL HEARING LOSS OF RIGHT EAR WITH RESTRICTED HEARING OF LEFT EAR: Status: ACTIVE | Noted: 2019-12-06

## 2023-06-02 PROBLEM — N32.3 BLADDER DIVERTICULUM: Status: ACTIVE | Noted: 2022-04-06

## 2023-06-02 PROCEDURE — G0472 HEP C SCREEN HIGH RISK/OTHER: HCPCS | Performed by: INTERNAL MEDICINE

## 2023-06-02 PROCEDURE — 82570 ASSAY OF URINE CREATININE: CPT | Performed by: INTERNAL MEDICINE

## 2023-06-02 PROCEDURE — 99214 OFFICE O/P EST MOD 30 MIN: CPT | Performed by: FAMILY MEDICINE

## 2023-06-02 PROCEDURE — 82306 VITAMIN D 25 HYDROXY: CPT | Performed by: INTERNAL MEDICINE

## 2023-06-02 PROCEDURE — 82043 UR ALBUMIN QUANTITATIVE: CPT | Performed by: INTERNAL MEDICINE

## 2023-06-02 PROCEDURE — 3078F DIAST BP <80 MM HG: CPT | Performed by: FAMILY MEDICINE

## 2023-06-02 PROCEDURE — 36415 COLL VENOUS BLD VENIPUNCTURE: CPT | Performed by: FAMILY MEDICINE

## 2023-06-02 PROCEDURE — 3074F SYST BP LT 130 MM HG: CPT | Performed by: FAMILY MEDICINE

## 2023-06-02 PROCEDURE — 80061 LIPID PANEL: CPT | Performed by: INTERNAL MEDICINE

## 2023-06-02 PROCEDURE — G0439 PPPS, SUBSEQ VISIT: HCPCS | Performed by: FAMILY MEDICINE

## 2023-06-02 PROCEDURE — 80053 COMPREHEN METABOLIC PANEL: CPT | Performed by: INTERNAL MEDICINE

## 2023-06-02 PROCEDURE — 84443 ASSAY THYROID STIM HORMONE: CPT | Performed by: INTERNAL MEDICINE

## 2023-06-02 PROCEDURE — 84153 ASSAY OF PSA TOTAL: CPT | Performed by: INTERNAL MEDICINE

## 2023-06-02 PROCEDURE — 86706 HEP B SURFACE ANTIBODY: CPT | Performed by: INTERNAL MEDICINE

## 2023-06-02 PROCEDURE — 99397 PER PM REEVAL EST PAT 65+ YR: CPT | Performed by: FAMILY MEDICINE

## 2023-06-02 ASSESSMENT — ACTIVITIES OF DAILY LIVING (ADL)
ADL_SCORE: 12
RECENT_DECLINE_ADL: NO
ADL_SHORT_OF_BREATH: YES
ADL_BEFORE_ADMISSION: INDEPENDENT
NEEDS_ASSIST: NO
SENSORY_SUPPORT_DEVICES: EYEGLASSES;DENTURES

## 2023-06-02 ASSESSMENT — VISUAL ACUITY
OS_CC: 20/20
OD_CC: 20/20

## 2023-06-02 ASSESSMENT — COGNITIVE AND FUNCTIONAL STATUS - GENERAL
ARE YOU DEAF OR DO YOU HAVE SERIOUS DIFFICULTY  HEARING: NO
BECAUSE OF A PHYSICAL, MENTAL, OR EMOTIONAL CONDITION, DO YOU HAVE SERIOUS DIFFICULTY CONCENTRATING, REMEMBERING OR MAKING DECISIONS: NO
DO YOU HAVE DIFFICULTY DRESSING OR BATHING: NO
DO YOU HAVE SERIOUS DIFFICULTY WALKING OR CLIMBING STAIRS: YES
BECAUSE OF A PHYSICAL, MENTAL, OR EMOTIONAL CONDITION, DO YOU HAVE DIFFICULTY DOING ERRANDS ALONE: NO
ARE YOU BLIND OR DO YOU HAVE SERIOUS DIFFICULTY SEEING, EVEN WHEN WEARING GLASSES: NO

## 2023-06-02 ASSESSMENT — MINI COG
PATIENT ABLE TO FILL IN THE CLOCK FACE WITH 10 MINUTES PAST 11 O'CLOCK?: YES, CLOCK IS CORRECT
PATIENT ABLE TO REPEAT THE 3 WORDS GIVEN PREVIOUSLY?: WAS ABLE TO REPEAT BACK 3 WORDS CORRECTLY
TOTAL SCORE: 5
PATIENT WAS GIVEN REPEAT BACK WORDS FROM VERSION: 1 - BANANA SUNRISE CHAIR

## 2023-06-03 LAB
25(OH)D3+25(OH)D2 SERPL-MCNC: 15.1 NG/ML (ref 30–100)
ALBUMIN SERPL-MCNC: 3.7 G/DL (ref 3.6–5.1)
ALBUMIN/GLOB SERPL: 1.2 {RATIO} (ref 1–2.4)
ALP SERPL-CCNC: 93 UNITS/L (ref 45–117)
ALT SERPL-CCNC: 21 UNITS/L
ANION GAP SERPL CALC-SCNC: 10 MMOL/L (ref 7–19)
AST SERPL-CCNC: 16 UNITS/L
BILIRUB SERPL-MCNC: 0.5 MG/DL (ref 0.2–1)
BUN SERPL-MCNC: 8 MG/DL (ref 6–20)
BUN/CREAT SERPL: 10 (ref 7–25)
CALCIUM SERPL-MCNC: 9.5 MG/DL (ref 8.4–10.2)
CHLORIDE SERPL-SCNC: 103 MMOL/L (ref 97–110)
CHOLEST SERPL-MCNC: 149 MG/DL
CHOLEST/HDLC SERPL: 2.5 {RATIO}
CO2 SERPL-SCNC: 25 MMOL/L (ref 21–32)
CREAT SERPL-MCNC: 0.77 MG/DL (ref 0.67–1.17)
CREAT UR-MCNC: 65.4 MG/DL
FASTING DURATION TIME PATIENT: ABNORMAL H
GFR SERPLBLD BASED ON 1.73 SQ M-ARVRAT: >90 ML/MIN
GLOBULIN SER-MCNC: 3.2 G/DL (ref 2–4)
GLUCOSE SERPL-MCNC: 211 MG/DL (ref 70–99)
HCV AB SER QL: NEGATIVE
HDLC SERPL-MCNC: 59 MG/DL
LDLC SERPL CALC-MCNC: 61 MG/DL
MICROALBUMIN UR-MCNC: <0.5 MG/DL
MICROALBUMIN/CREAT UR: NORMAL MG/G{CREAT}
NONHDLC SERPL-MCNC: 90 MG/DL
POTASSIUM SERPL-SCNC: 4.2 MMOL/L (ref 3.4–5.1)
PROT SERPL-MCNC: 6.9 G/DL (ref 6.4–8.2)
PSA SERPL-MCNC: 0.18 NG/ML
SODIUM SERPL-SCNC: 134 MMOL/L (ref 135–145)
TRIGL SERPL-MCNC: 143 MG/DL
TSH SERPL-ACNC: 1.36 MCUNITS/ML (ref 0.35–5)

## 2023-06-04 LAB — HBV SURFACE AB SER-ACNC: 302.83 MUNITS/ML

## 2023-06-12 RX ORDER — OMEPRAZOLE 40 MG/1
CAPSULE, DELAYED RELEASE ORAL
Qty: 90 CAPSULE | Refills: 2 | Status: SHIPPED | OUTPATIENT
Start: 2023-06-12

## 2023-06-14 ENCOUNTER — TELEPHONE (OUTPATIENT)
Dept: PULMONOLOGY | Age: 73
End: 2023-06-14

## 2023-06-14 ENCOUNTER — APPOINTMENT (OUTPATIENT)
Dept: PULMONOLOGY | Age: 73
End: 2023-06-14

## 2023-06-20 RX ORDER — ERGOCALCIFEROL 1.25 MG/1
1.25 CAPSULE ORAL
Qty: 12 CAPSULE | Refills: 0 | Status: SHIPPED | OUTPATIENT
Start: 2023-06-26 | End: 2023-09-12

## 2023-06-22 ENCOUNTER — APPOINTMENT (OUTPATIENT)
Dept: PULMONOLOGY | Age: 73
End: 2023-06-22

## 2023-07-05 ENCOUNTER — TELEPHONE (OUTPATIENT)
Dept: FAMILY MEDICINE | Age: 73
End: 2023-07-05

## 2023-07-06 ENCOUNTER — TELEPHONE (OUTPATIENT)
Dept: FAMILY MEDICINE | Age: 73
End: 2023-07-06

## 2023-07-07 ENCOUNTER — TELEPHONE (OUTPATIENT)
Dept: FAMILY MEDICINE | Age: 73
End: 2023-07-07

## 2023-07-11 ENCOUNTER — OFFICE VISIT (OUTPATIENT)
Dept: FAMILY MEDICINE | Age: 73
End: 2023-07-11

## 2023-07-11 VITALS
OXYGEN SATURATION: 93 % | RESPIRATION RATE: 36 BRPM | HEART RATE: 88 BPM | SYSTOLIC BLOOD PRESSURE: 102 MMHG | BODY MASS INDEX: 19.29 KG/M2 | WEIGHT: 113 LBS | TEMPERATURE: 97.1 F | HEIGHT: 64 IN | DIASTOLIC BLOOD PRESSURE: 48 MMHG

## 2023-07-11 DIAGNOSIS — J18.9 PNEUMONIA DUE TO INFECTIOUS ORGANISM, UNSPECIFIED LATERALITY, UNSPECIFIED PART OF LUNG: Primary | ICD-10-CM

## 2023-07-11 DIAGNOSIS — I27.20 PULMONARY HTN (CMD): ICD-10-CM

## 2023-07-11 DIAGNOSIS — R22.2 PLEURAL NODULES: ICD-10-CM

## 2023-07-11 DIAGNOSIS — R01.2 THIRD HEART SOUND (S3): ICD-10-CM

## 2023-07-11 DIAGNOSIS — A41.9 SEPSIS, DUE TO UNSPECIFIED ORGANISM, UNSPECIFIED WHETHER ACUTE ORGAN DYSFUNCTION PRESENT (CMD): ICD-10-CM

## 2023-07-11 DIAGNOSIS — Z99.81 DEPENDENCE ON SUPPLEMENTAL OXYGEN: ICD-10-CM

## 2023-07-11 DIAGNOSIS — R06.02 SHORTNESS OF BREATH: ICD-10-CM

## 2023-07-11 DIAGNOSIS — J84.9 INTERSTITIAL LUNG DISEASE (CMD): ICD-10-CM

## 2023-07-11 PROCEDURE — 99496 TRANSJ CARE MGMT HIGH F2F 7D: CPT | Performed by: FAMILY MEDICINE

## 2023-07-11 RX ORDER — IPRATROPIUM BROMIDE AND ALBUTEROL SULFATE 2.5; .5 MG/3ML; MG/3ML
3 SOLUTION RESPIRATORY (INHALATION) EVERY 6 HOURS PRN
Qty: 360 ML | Refills: 5 | Status: SHIPPED | OUTPATIENT
Start: 2023-07-11

## 2023-07-11 RX ORDER — CEFDINIR 300 MG/1
CAPSULE ORAL
COMMUNITY
Start: 2023-07-07 | End: 2023-08-07 | Stop reason: ALTCHOICE

## 2023-07-11 RX ORDER — AZITHROMYCIN 250 MG/1
TABLET, FILM COATED ORAL
COMMUNITY
Start: 2023-07-07 | End: 2023-08-07 | Stop reason: ALTCHOICE

## 2023-07-13 ENCOUNTER — TELEPHONE (OUTPATIENT)
Dept: FAMILY MEDICINE CLINIC | Facility: CLINIC | Age: 73
End: 2023-07-13

## 2023-07-18 ASSESSMENT — ENCOUNTER SYMPTOMS
CONFUSION: 0
VOMITING: 0
FEVER: 0
ABDOMINAL PAIN: 0
COUGH: 0
HEADACHES: 0
DIARRHEA: 0
WHEEZING: 0
CHILLS: 0
LIGHT-HEADEDNESS: 0
CONSTIPATION: 0
ACTIVITY CHANGE: 0
DIZZINESS: 0
NAUSEA: 0
SHORTNESS OF BREATH: 1
CHEST TIGHTNESS: 0

## 2023-07-19 ENCOUNTER — TELEPHONE (OUTPATIENT)
Dept: FAMILY MEDICINE | Age: 73
End: 2023-07-19

## 2023-08-03 ENCOUNTER — OFFICE VISIT (OUTPATIENT)
Dept: PULMONOLOGY | Age: 73
End: 2023-08-03

## 2023-08-03 VITALS
HEIGHT: 64 IN | DIASTOLIC BLOOD PRESSURE: 74 MMHG | OXYGEN SATURATION: 93 % | SYSTOLIC BLOOD PRESSURE: 130 MMHG | HEART RATE: 105 BPM | BODY MASS INDEX: 19.29 KG/M2 | WEIGHT: 113 LBS

## 2023-08-03 DIAGNOSIS — J96.11 CHRONIC RESPIRATORY FAILURE WITH HYPOXIA (CMD): Primary | ICD-10-CM

## 2023-08-03 PROCEDURE — 99214 OFFICE O/P EST MOD 30 MIN: CPT | Performed by: INTERNAL MEDICINE

## 2023-08-03 PROCEDURE — 3078F DIAST BP <80 MM HG: CPT | Performed by: INTERNAL MEDICINE

## 2023-08-03 PROCEDURE — 3075F SYST BP GE 130 - 139MM HG: CPT | Performed by: INTERNAL MEDICINE

## 2023-08-03 ASSESSMENT — PAIN SCALES - GENERAL: PAINLEVEL: 0

## 2023-08-07 ENCOUNTER — OFFICE VISIT (OUTPATIENT)
Dept: FAMILY MEDICINE | Age: 73
End: 2023-08-07

## 2023-08-07 VITALS
SYSTOLIC BLOOD PRESSURE: 106 MMHG | RESPIRATION RATE: 40 BRPM | DIASTOLIC BLOOD PRESSURE: 42 MMHG | HEIGHT: 64 IN | BODY MASS INDEX: 18.78 KG/M2 | TEMPERATURE: 97.5 F | OXYGEN SATURATION: 92 % | WEIGHT: 110 LBS | HEART RATE: 108 BPM

## 2023-08-07 DIAGNOSIS — B37.42 CANDIDAL BALANITIS: Primary | ICD-10-CM

## 2023-08-07 PROCEDURE — 3078F DIAST BP <80 MM HG: CPT | Performed by: FAMILY MEDICINE

## 2023-08-07 PROCEDURE — 3074F SYST BP LT 130 MM HG: CPT | Performed by: FAMILY MEDICINE

## 2023-08-07 PROCEDURE — 99213 OFFICE O/P EST LOW 20 MIN: CPT | Performed by: FAMILY MEDICINE

## 2023-08-11 ASSESSMENT — ENCOUNTER SYMPTOMS
UNUSUAL HAIR DISTRIBUTION: 0
POOR WOUND HEALING: 0
SUSPICIOUS LESIONS: 0

## 2023-08-15 ENCOUNTER — TELEPHONE (OUTPATIENT)
Dept: FAMILY MEDICINE CLINIC | Facility: CLINIC | Age: 73
End: 2023-08-15

## 2023-08-21 RX ORDER — TAMSULOSIN HYDROCHLORIDE 0.4 MG/1
0.4 CAPSULE ORAL
Qty: 90 CAPSULE | Refills: 1 | Status: SHIPPED | OUTPATIENT
Start: 2023-08-21 | End: 2024-08-15

## 2023-08-21 RX ORDER — FERROUS SULFATE 325(65) MG
325 TABLET ORAL
Qty: 90 TABLET | Refills: 1 | Status: SHIPPED | OUTPATIENT
Start: 2023-08-21

## 2023-08-21 RX ORDER — FINASTERIDE 5 MG/1
5 TABLET, FILM COATED ORAL DAILY
Qty: 90 TABLET | Refills: 1 | Status: SHIPPED | OUTPATIENT
Start: 2023-08-21

## 2023-09-07 ENCOUNTER — TELEPHONE (OUTPATIENT)
Dept: FAMILY MEDICINE | Age: 73
End: 2023-09-07

## 2023-09-14 ENCOUNTER — OFFICE VISIT (OUTPATIENT)
Dept: FAMILY MEDICINE | Age: 73
End: 2023-09-14

## 2023-09-14 VITALS
OXYGEN SATURATION: 98 % | BODY MASS INDEX: 18.44 KG/M2 | HEART RATE: 74 BPM | RESPIRATION RATE: 16 BRPM | WEIGHT: 108 LBS | SYSTOLIC BLOOD PRESSURE: 126 MMHG | HEIGHT: 64 IN | DIASTOLIC BLOOD PRESSURE: 60 MMHG | TEMPERATURE: 97 F

## 2023-09-14 DIAGNOSIS — Z23 NEED FOR VACCINATION: ICD-10-CM

## 2023-09-14 DIAGNOSIS — R22.1 MASS OF RIGHT SIDE OF NECK: Primary | ICD-10-CM

## 2023-09-14 PROCEDURE — 90662 IIV NO PRSV INCREASED AG IM: CPT | Performed by: FAMILY MEDICINE

## 2023-09-14 PROCEDURE — G0008 ADMIN INFLUENZA VIRUS VAC: HCPCS | Performed by: FAMILY MEDICINE

## 2023-09-14 PROCEDURE — 3078F DIAST BP <80 MM HG: CPT | Performed by: FAMILY MEDICINE

## 2023-09-14 PROCEDURE — 3074F SYST BP LT 130 MM HG: CPT | Performed by: FAMILY MEDICINE

## 2023-09-14 PROCEDURE — 99214 OFFICE O/P EST MOD 30 MIN: CPT | Performed by: FAMILY MEDICINE

## 2023-09-18 ENCOUNTER — HOSPITAL ENCOUNTER (OUTPATIENT)
Dept: ULTRASOUND IMAGING | Age: 73
Discharge: HOME | End: 2023-09-18
Attending: FAMILY MEDICINE

## 2023-09-18 DIAGNOSIS — R22.1 MASS OF RIGHT SIDE OF NECK: ICD-10-CM

## 2023-09-18 PROCEDURE — 76536 US EXAM OF HEAD AND NECK: CPT

## 2023-09-19 ENCOUNTER — OFFICE VISIT (OUTPATIENT)
Dept: PULMONOLOGY | Age: 73
End: 2023-09-19

## 2023-09-19 VITALS
BODY MASS INDEX: 19.63 KG/M2 | SYSTOLIC BLOOD PRESSURE: 111 MMHG | OXYGEN SATURATION: 98 % | WEIGHT: 115 LBS | HEIGHT: 64 IN | DIASTOLIC BLOOD PRESSURE: 69 MMHG | HEART RATE: 89 BPM

## 2023-09-19 DIAGNOSIS — J84.9 INTERSTITIAL LUNG DISEASE (CMD): Primary | ICD-10-CM

## 2023-09-19 PROCEDURE — 3078F DIAST BP <80 MM HG: CPT | Performed by: INTERNAL MEDICINE

## 2023-09-19 PROCEDURE — 3074F SYST BP LT 130 MM HG: CPT | Performed by: INTERNAL MEDICINE

## 2023-09-19 PROCEDURE — 99214 OFFICE O/P EST MOD 30 MIN: CPT | Performed by: INTERNAL MEDICINE

## 2023-10-12 NOTE — TELEPHONE ENCOUNTER
E-Visit Note:    HPI: per patient questionnaire, this has been reviewed  EXAM: n/a    ----------------------------------  1. Acute cystitis without hematuria     Plan - Urine culture and meds. ----------------------------------  The patient was advised to call if these symptoms worsen or fail to improve as anticipated. 5-10 minutes were spent on the digital evaluation and management of this patient.      Yarelis Aguilera MD The valtrex does not really help with the pain  Gabapentin is given for pain. If pain persisiting, would recommend seeing pain management, DR Subhash Alston.

## 2023-10-18 RX ORDER — ATORVASTATIN CALCIUM 20 MG/1
20 TABLET, FILM COATED ORAL DAILY
Qty: 90 TABLET | Refills: 0 | Status: SHIPPED | OUTPATIENT
Start: 2023-10-18

## 2023-10-31 ENCOUNTER — TELEPHONE (OUTPATIENT)
Dept: FAMILY MEDICINE | Age: 73
End: 2023-10-31

## 2023-11-27 ENCOUNTER — APPOINTMENT (OUTPATIENT)
Dept: PULMONOLOGY | Age: 73
End: 2023-11-27

## 2023-11-27 ENCOUNTER — HOSPITAL ENCOUNTER (OUTPATIENT)
Dept: GENERAL RADIOLOGY | Age: 73
Discharge: HOME | End: 2023-11-27
Attending: INTERNAL MEDICINE

## 2023-11-27 ENCOUNTER — LAB SERVICES (OUTPATIENT)
Dept: LAB | Age: 73
End: 2023-11-27
Attending: INTERNAL MEDICINE

## 2023-11-27 VITALS
HEIGHT: 64 IN | DIASTOLIC BLOOD PRESSURE: 72 MMHG | BODY MASS INDEX: 19.74 KG/M2 | SYSTOLIC BLOOD PRESSURE: 116 MMHG | HEART RATE: 114 BPM

## 2023-11-27 DIAGNOSIS — J84.9 INTERSTITIAL LUNG DISEASE (CMD): Primary | ICD-10-CM

## 2023-11-27 DIAGNOSIS — J84.9 INTERSTITIAL LUNG DISEASE (CMD): ICD-10-CM

## 2023-11-27 LAB
ALBUMIN SERPL-MCNC: 3.6 G/DL (ref 3.6–5.1)
ALP SERPL-CCNC: 140 UNITS/L (ref 45–117)
ALT SERPL-CCNC: 20 UNITS/L
APPEARANCE UR: ABNORMAL
AST SERPL-CCNC: 11 UNITS/L
BACTERIA #/AREA URNS HPF: ABNORMAL /HPF
BASOPHILS # BLD: 0.1 K/MCL (ref 0–0.3)
BASOPHILS NFR BLD: 1 %
BILIRUB CONJ SERPL-MCNC: 0.2 MG/DL (ref 0–0.2)
BILIRUB SERPL-MCNC: 0.5 MG/DL (ref 0.2–1)
BILIRUB UR QL STRIP: NEGATIVE
COLOR UR: ABNORMAL
DEPRECATED RDW RBC: 42 FL (ref 39–50)
EOSINOPHIL # BLD: 0.2 K/MCL (ref 0–0.5)
EOSINOPHIL NFR BLD: 2 %
ERYTHROCYTE [DISTWIDTH] IN BLOOD: 12.3 % (ref 11–15)
GLUCOSE UR STRIP-MCNC: NEGATIVE MG/DL
HCT VFR BLD CALC: 42.2 % (ref 39–51)
HGB BLD-MCNC: 13.6 G/DL (ref 13–17)
HGB UR QL STRIP: ABNORMAL
HYALINE CASTS #/AREA URNS LPF: ABNORMAL /LPF
IMM GRANULOCYTES # BLD AUTO: 0.1 K/MCL (ref 0–0.2)
IMM GRANULOCYTES # BLD: 1 %
KETONES UR STRIP-MCNC: NEGATIVE MG/DL
LEUKOCYTE ESTERASE UR QL STRIP: ABNORMAL
LYMPHOCYTES # BLD: 1.6 K/MCL (ref 1–4)
LYMPHOCYTES NFR BLD: 14 %
MCH RBC QN AUTO: 29.8 PG (ref 26–34)
MCHC RBC AUTO-ENTMCNC: 32.2 G/DL (ref 32–36.5)
MCV RBC AUTO: 92.3 FL (ref 78–100)
MONOCYTES # BLD: 0.7 K/MCL (ref 0.3–0.9)
MONOCYTES NFR BLD: 6 %
MUCOUS THREADS URNS QL MICRO: PRESENT
NEUTROPHILS # BLD: 8.4 K/MCL (ref 1.8–7.7)
NEUTROPHILS NFR BLD: 76 %
NITRITE UR QL STRIP: POSITIVE
NRBC BLD MANUAL-RTO: 0 /100 WBC
PH UR STRIP: 6 [PH] (ref 5–7)
PLATELET # BLD AUTO: 601 K/MCL (ref 140–450)
PROT SERPL-MCNC: 8.2 G/DL (ref 6.4–8.2)
PROT UR STRIP-MCNC: 100 MG/DL
RBC # BLD: 4.57 MIL/MCL (ref 4.5–5.9)
RBC #/AREA URNS HPF: ABNORMAL /HPF
SP GR UR STRIP: 1.01 (ref 1–1.03)
SQUAMOUS #/AREA URNS HPF: ABNORMAL /HPF
UROBILINOGEN UR STRIP-MCNC: 0.2 MG/DL
WBC # BLD: 11 K/MCL (ref 4.2–11)
WBC #/AREA URNS HPF: >100 /HPF

## 2023-11-27 PROCEDURE — 87186 SC STD MICRODIL/AGAR DIL: CPT | Performed by: INTERNAL MEDICINE

## 2023-11-27 PROCEDURE — 3078F DIAST BP <80 MM HG: CPT | Performed by: INTERNAL MEDICINE

## 2023-11-27 PROCEDURE — 99214 OFFICE O/P EST MOD 30 MIN: CPT | Performed by: INTERNAL MEDICINE

## 2023-11-27 PROCEDURE — 81001 URINALYSIS AUTO W/SCOPE: CPT | Performed by: INTERNAL MEDICINE

## 2023-11-27 PROCEDURE — 3074F SYST BP LT 130 MM HG: CPT | Performed by: INTERNAL MEDICINE

## 2023-11-27 PROCEDURE — 87086 URINE CULTURE/COLONY COUNT: CPT | Performed by: INTERNAL MEDICINE

## 2023-11-27 PROCEDURE — 71046 X-RAY EXAM CHEST 2 VIEWS: CPT

## 2023-11-27 PROCEDURE — 36415 COLL VENOUS BLD VENIPUNCTURE: CPT

## 2023-11-27 PROCEDURE — 87088 URINE BACTERIA CULTURE: CPT | Performed by: CLINICAL MEDICAL LABORATORY

## 2023-11-27 PROCEDURE — 80076 HEPATIC FUNCTION PANEL: CPT

## 2023-11-27 PROCEDURE — 85025 COMPLETE CBC W/AUTO DIFF WBC: CPT

## 2023-11-27 RX ORDER — ALPRAZOLAM 0.25 MG/1
0.25 TABLET ORAL 3 TIMES DAILY
Qty: 30 TABLET | Refills: 1 | Status: SHIPPED | OUTPATIENT
Start: 2023-11-27 | End: 2023-12-08

## 2023-11-29 LAB — BACTERIA UR CULT: ABNORMAL

## 2023-11-30 ENCOUNTER — TELEPHONE (OUTPATIENT)
Dept: PULMONOLOGY | Age: 73
End: 2023-11-30

## 2023-11-30 DIAGNOSIS — N39.0 URINARY TRACT INFECTION WITHOUT HEMATURIA, SITE UNSPECIFIED: Primary | ICD-10-CM

## 2023-11-30 RX ORDER — SULFAMETHOXAZOLE AND TRIMETHOPRIM 800; 160 MG/1; MG/1
1 TABLET ORAL 2 TIMES DAILY
Qty: 10 TABLET | Refills: 0 | Status: SHIPPED | OUTPATIENT
Start: 2023-11-30 | End: 2023-12-05

## 2023-12-08 ENCOUNTER — OFFICE VISIT (OUTPATIENT)
Dept: FAMILY MEDICINE | Age: 73
End: 2023-12-08

## 2023-12-08 VITALS
HEART RATE: 111 BPM | TEMPERATURE: 98 F | RESPIRATION RATE: 16 BRPM | OXYGEN SATURATION: 81 % | SYSTOLIC BLOOD PRESSURE: 110 MMHG | HEIGHT: 64 IN | WEIGHT: 94 LBS | BODY MASS INDEX: 16.05 KG/M2 | DIASTOLIC BLOOD PRESSURE: 62 MMHG

## 2023-12-08 DIAGNOSIS — Z12.11 SCREEN FOR COLON CANCER: ICD-10-CM

## 2023-12-08 DIAGNOSIS — I10 ESSENTIAL HYPERTENSION: Primary | ICD-10-CM

## 2023-12-08 DIAGNOSIS — I27.20 PULMONARY HTN (CMD): ICD-10-CM

## 2023-12-08 DIAGNOSIS — E11.9 DIABETES MELLITUS TYPE 2 WITHOUT RETINOPATHY (CMD): ICD-10-CM

## 2023-12-08 DIAGNOSIS — J84.9 INTERSTITIAL LUNG DISEASE (CMD): ICD-10-CM

## 2023-12-08 DIAGNOSIS — J84.10 PULMONARY FIBROSIS (CMD): ICD-10-CM

## 2023-12-08 PROCEDURE — 3078F DIAST BP <80 MM HG: CPT | Performed by: FAMILY MEDICINE

## 2023-12-08 PROCEDURE — 99214 OFFICE O/P EST MOD 30 MIN: CPT | Performed by: FAMILY MEDICINE

## 2023-12-08 PROCEDURE — 80053 COMPREHEN METABOLIC PANEL: CPT | Performed by: CLINICAL MEDICAL LABORATORY

## 2023-12-08 PROCEDURE — 80061 LIPID PANEL: CPT | Performed by: CLINICAL MEDICAL LABORATORY

## 2023-12-08 PROCEDURE — 83036 HEMOGLOBIN GLYCOSYLATED A1C: CPT | Performed by: CLINICAL MEDICAL LABORATORY

## 2023-12-08 PROCEDURE — 36415 COLL VENOUS BLD VENIPUNCTURE: CPT | Performed by: FAMILY MEDICINE

## 2023-12-08 PROCEDURE — 3074F SYST BP LT 130 MM HG: CPT | Performed by: FAMILY MEDICINE

## 2023-12-08 RX ORDER — AMOXICILLIN 250 MG
1-2 CAPSULE ORAL DAILY PRN
Qty: 60 TABLET | Refills: 1 | Status: SHIPPED | OUTPATIENT
Start: 2023-12-08 | End: 2023-12-11

## 2023-12-08 RX ORDER — FLUTICASONE PROPIONATE 50 MCG
1 SPRAY, SUSPENSION (ML) NASAL 2 TIMES DAILY
Qty: 16 G | Refills: 3 | Status: SHIPPED | OUTPATIENT
Start: 2023-12-08

## 2023-12-08 ASSESSMENT — ENCOUNTER SYMPTOMS
COUGH: 0
GASTROINTESTINAL NEGATIVE: 1
PSYCHIATRIC NEGATIVE: 1
EYES NEGATIVE: 1
ENDOCRINE NEGATIVE: 1
CONSTITUTIONAL NEGATIVE: 1
SHORTNESS OF BREATH: 1

## 2023-12-08 ASSESSMENT — PATIENT HEALTH QUESTIONNAIRE - PHQ9
SUM OF ALL RESPONSES TO PHQ QUESTIONS 1-9: 0
CLINICAL INTERPRETATION OF PHQ2 SCORE: NO FURTHER SCREENING NEEDED
7. TROUBLE CONCENTRATING ON THINGS, SUCH AS READING THE NEWSPAPER OR WATCHING TELEVISION: NOT AT ALL
SUM OF ALL RESPONSES TO PHQ9 QUESTIONS 1 AND 2: 0
1. LITTLE INTEREST OR PLEASURE IN DOING THINGS: NOT AT ALL
SUM OF ALL RESPONSES TO PHQ9 QUESTIONS 1 AND 2: 0
2. FEELING DOWN, DEPRESSED OR HOPELESS: NOT AT ALL
8. MOVING OR SPEAKING SO SLOWLY THAT OTHER PEOPLE COULD HAVE NOTICED. OR THE OPPOSITE, BEING SO FIGETY OR RESTLESS THAT YOU HAVE BEEN MOVING AROUND A LOT MORE THAN USUAL: NOT AT ALL
9. THOUGHTS THAT YOU WOULD BE BETTER OFF DEAD, OR OF HURTING YOURSELF: NOT AT ALL
6. FEELING BAD ABOUT YOURSELF - OR THAT YOU ARE A FAILURE OR HAVE LET YOURSELF OR YOUR FAMILY DOWN: NOT AT ALL
10. IF YOU CHECKED OFF ANY PROBLEMS, HOW DIFFICULT HAVE THESE PROBLEMS MADE IT FOR YOU TO DO YOUR WORK, TAKE CARE OF THINGS AT HOME, OR GET ALONG WITH OTHER PEOPLE: NOT DIFFICULT AT ALL
5. POOR APPETITE, WEIGHT LOSS, OR OVEREATING: NOT AT ALL
3. TROUBLE FALLING OR STAYING ASLEEP OR SLEEPING TOO MUCH: NOT AT ALL
4. FEELING TIRED OR HAVING LITTLE ENERGY: NOT AT ALL

## 2023-12-09 LAB
ALBUMIN SERPL-MCNC: 3.7 G/DL (ref 3.6–5.1)
ALBUMIN/GLOB SERPL: 1 {RATIO} (ref 1–2.4)
ALP SERPL-CCNC: 150 UNITS/L (ref 45–117)
ALT SERPL-CCNC: 22 UNITS/L
ANION GAP SERPL CALC-SCNC: 12 MMOL/L (ref 7–19)
AST SERPL-CCNC: 18 UNITS/L
BILIRUB SERPL-MCNC: 0.4 MG/DL (ref 0.2–1)
BUN SERPL-MCNC: 8 MG/DL (ref 6–20)
BUN/CREAT SERPL: 11 (ref 7–25)
CALCIUM SERPL-MCNC: 10.1 MG/DL (ref 8.4–10.2)
CHLORIDE SERPL-SCNC: 102 MMOL/L (ref 97–110)
CHOLEST SERPL-MCNC: 147 MG/DL
CHOLEST/HDLC SERPL: 3 {RATIO}
CO2 SERPL-SCNC: 26 MMOL/L (ref 21–32)
CREAT SERPL-MCNC: 0.71 MG/DL (ref 0.67–1.17)
EGFRCR SERPLBLD CKD-EPI 2021: >90 ML/MIN/{1.73_M2}
FASTING DURATION TIME PATIENT: ABNORMAL H
GLOBULIN SER-MCNC: 3.6 G/DL (ref 2–4)
GLUCOSE SERPL-MCNC: 223 MG/DL (ref 70–99)
HBA1C MFR BLD: 6.3 % (ref 4.5–5.6)
HDLC SERPL-MCNC: 49 MG/DL
LDLC SERPL CALC-MCNC: 75 MG/DL
NONHDLC SERPL-MCNC: 98 MG/DL
POTASSIUM SERPL-SCNC: 4.7 MMOL/L (ref 3.4–5.1)
PROT SERPL-MCNC: 7.3 G/DL (ref 6.4–8.2)
SODIUM SERPL-SCNC: 135 MMOL/L (ref 135–145)
TRIGL SERPL-MCNC: 114 MG/DL

## 2024-02-06 ENCOUNTER — EXTERNAL RECORD (OUTPATIENT)
Dept: HEALTH INFORMATION MANAGEMENT | Facility: OTHER | Age: 74
End: 2024-02-06

## 2024-02-08 ENCOUNTER — CASE MANAGEMENT (OUTPATIENT)
Dept: CARE COORDINATION | Age: 74
End: 2024-02-08

## 2024-02-09 ENCOUNTER — CASE MANAGEMENT (OUTPATIENT)
Dept: CARE COORDINATION | Age: 74
End: 2024-02-09

## 2024-02-14 ENCOUNTER — CASE MANAGEMENT (OUTPATIENT)
Dept: CARE COORDINATION | Age: 74
End: 2024-02-14

## 2024-02-19 ENCOUNTER — TELEPHONE (OUTPATIENT)
Dept: FAMILY MEDICINE CLINIC | Facility: CLINIC | Age: 74
End: 2024-02-19

## 2024-02-21 ENCOUNTER — NURSE TRIAGE (OUTPATIENT)
Dept: INTERNAL MEDICINE | Age: 74
End: 2024-02-21

## 2024-02-21 RX ORDER — FINASTERIDE 5 MG/1
5 TABLET, FILM COATED ORAL DAILY
Qty: 90 TABLET | Refills: 1 | Status: SHIPPED | OUTPATIENT
Start: 2024-02-21

## 2024-02-21 RX ORDER — SENNA AND DOCUSATE SODIUM 50; 8.6 MG/1; MG/1
1 TABLET, FILM COATED ORAL DAILY PRN
Qty: 60 TABLET | Refills: 1 | Status: SHIPPED | OUTPATIENT
Start: 2024-02-21

## 2024-02-21 RX ORDER — FERROUS SULFATE 325(65) MG
1 TABLET ORAL
Qty: 90 TABLET | Refills: 1 | Status: SHIPPED | OUTPATIENT
Start: 2024-02-21

## 2024-02-21 RX ORDER — ATORVASTATIN CALCIUM 20 MG/1
20 TABLET, FILM COATED ORAL DAILY
Qty: 90 TABLET | Refills: 1 | Status: SHIPPED | OUTPATIENT
Start: 2024-02-21

## 2024-02-28 ENCOUNTER — TELEPHONE (OUTPATIENT)
Dept: FAMILY MEDICINE | Age: 74
End: 2024-02-28

## 2024-03-05 ENCOUNTER — CASE MANAGEMENT (OUTPATIENT)
Dept: CARE COORDINATION | Age: 74
End: 2024-03-05

## 2024-03-06 ENCOUNTER — CASE MANAGEMENT (OUTPATIENT)
Dept: CARE COORDINATION | Age: 74
End: 2024-03-06

## 2024-03-06 ENCOUNTER — APPOINTMENT (OUTPATIENT)
Dept: PULMONOLOGY | Age: 74
End: 2024-03-06

## 2024-03-13 ENCOUNTER — CASE MANAGEMENT (OUTPATIENT)
Dept: CARE COORDINATION | Age: 74
End: 2024-03-13

## 2024-03-13 ENCOUNTER — TELEPHONE (OUTPATIENT)
Dept: FAMILY MEDICINE CLINIC | Facility: CLINIC | Age: 74
End: 2024-03-13

## 2024-03-13 NOTE — TELEPHONE ENCOUNTER
Patient states he would like to cancel his insurance with us. RN advised we do not provide insurance. Patient states \"Okay thank you\" and hung up. It looks like patient has HMO medicare and Dr. Larsen is listed.     Can we reach out to the patient and advise he contact Medicare to change this? It looks like he has been going to [facility].

## 2024-03-14 ENCOUNTER — CASE MANAGEMENT (OUTPATIENT)
Dept: CARE COORDINATION | Age: 74
End: 2024-03-14

## 2024-03-14 ENCOUNTER — TELEPHONE (OUTPATIENT)
Dept: FAMILY MEDICINE | Age: 74
End: 2024-03-14

## 2024-03-15 ENCOUNTER — CASE MANAGEMENT (OUTPATIENT)
Dept: CARE COORDINATION | Age: 74
End: 2024-03-15

## 2024-03-15 RX ORDER — SULFAMETHOXAZOLE AND TRIMETHOPRIM 400; 80 MG/1; MG/1
1 TABLET ORAL 2 TIMES DAILY
COMMUNITY

## 2024-03-15 RX ORDER — PREDNISONE 10 MG/1
10 TABLET ORAL DAILY
COMMUNITY

## 2024-03-15 RX ORDER — LEVOFLOXACIN 500 MG/1
500 TABLET, FILM COATED ORAL DAILY
COMMUNITY
Start: 2024-03-13 | End: 2024-03-19

## 2024-03-15 SDOH — ECONOMIC STABILITY: TRANSPORTATION INSECURITY
IN THE PAST 12 MONTHS, HAS LACK OF RELIABLE TRANSPORTATION KEPT YOU FROM MEDICAL APPOINTMENTS, MEETINGS, WORK OR FROM GETTING THINGS NEEDED FOR DAILY LIVING?: NO

## 2024-03-15 SDOH — ECONOMIC STABILITY: HOUSING INSECURITY: DO YOU HAVE PROBLEMS WITH ANY OF THE FOLLOWING?: NONE OF THE ABOVE

## 2024-03-15 SDOH — ECONOMIC STABILITY: HOUSING INSECURITY: WHAT IS YOUR LIVING SITUATION TODAY?: I HAVE A STEADY PLACE TO LIVE

## 2024-03-15 SDOH — ECONOMIC STABILITY: INCOME INSECURITY: IN THE PAST 12 MONTHS, HAS THE ELECTRIC, GAS, OIL, OR WATER COMPANY THREATENED TO SHUT OFF SERVICE IN YOUR HOME?: NO

## 2024-03-15 ASSESSMENT — ACTIVITIES OF DAILY LIVING (ADL): DME_IN_USE: NEBULIZER;OXYGEN;PULSE OXIMETER

## 2024-03-15 ASSESSMENT — SLEEP AND FATIGUE QUESTIONNAIRES: SLEEP DESCRIPTORS: DIFFICULTY SLEEPING AT NIGHT

## 2024-03-18 ENCOUNTER — CASE MANAGEMENT (OUTPATIENT)
Dept: CARE COORDINATION | Age: 74
End: 2024-03-18

## 2024-03-19 ENCOUNTER — APPOINTMENT (OUTPATIENT)
Dept: FAMILY MEDICINE | Age: 74
End: 2024-03-19

## 2024-03-19 ENCOUNTER — CASE MANAGEMENT (OUTPATIENT)
Dept: CARE COORDINATION | Age: 74
End: 2024-03-19

## 2024-03-19 DIAGNOSIS — I51.7 CARDIOMEGALY: ICD-10-CM

## 2024-03-19 DIAGNOSIS — I27.20 PULMONARY HTN (CMD): ICD-10-CM

## 2024-03-19 DIAGNOSIS — J96.10 CHRONIC RESPIRATORY FAILURE, UNSPECIFIED WHETHER WITH HYPOXIA OR HYPERCAPNIA (CMD): ICD-10-CM

## 2024-03-19 DIAGNOSIS — E11.69 TYPE 2 DIABETES MELLITUS WITH OTHER SPECIFIED COMPLICATION, WITHOUT LONG-TERM CURRENT USE OF INSULIN (CMD): ICD-10-CM

## 2024-03-19 DIAGNOSIS — J84.10 PULMONARY FIBROSIS (CMD): ICD-10-CM

## 2024-03-19 DIAGNOSIS — J84.9 INTERSTITIAL LUNG DISEASE (CMD): Primary | ICD-10-CM

## 2024-03-19 PROCEDURE — 99214 OFFICE O/P EST MOD 30 MIN: CPT | Performed by: FAMILY MEDICINE

## 2024-03-19 ASSESSMENT — PATIENT HEALTH QUESTIONNAIRE - PHQ9
7. TROUBLE CONCENTRATING ON THINGS, SUCH AS READING THE NEWSPAPER OR WATCHING TELEVISION: NOT AT ALL
1. LITTLE INTEREST OR PLEASURE IN DOING THINGS: NOT AT ALL
SUM OF ALL RESPONSES TO PHQ9 QUESTIONS 1 AND 2: 0
4. FEELING TIRED OR HAVING LITTLE ENERGY: NOT AT ALL
2. FEELING DOWN, DEPRESSED OR HOPELESS: NOT AT ALL
5. POOR APPETITE, WEIGHT LOSS, OR OVEREATING: NOT AT ALL
3. TROUBLE FALLING OR STAYING ASLEEP OR SLEEPING TOO MUCH: NOT AT ALL
SUM OF ALL RESPONSES TO PHQ QUESTIONS 1-9: 0
CLINICAL INTERPRETATION OF PHQ2 SCORE: NO FURTHER SCREENING NEEDED
10. IF YOU CHECKED OFF ANY PROBLEMS, HOW DIFFICULT HAVE THESE PROBLEMS MADE IT FOR YOU TO DO YOUR WORK, TAKE CARE OF THINGS AT HOME, OR GET ALONG WITH OTHER PEOPLE: NOT DIFFICULT AT ALL
8. MOVING OR SPEAKING SO SLOWLY THAT OTHER PEOPLE COULD HAVE NOTICED. OR THE OPPOSITE, BEING SO FIGETY OR RESTLESS THAT YOU HAVE BEEN MOVING AROUND A LOT MORE THAN USUAL: NOT AT ALL
9. THOUGHTS THAT YOU WOULD BE BETTER OFF DEAD, OR OF HURTING YOURSELF: NOT AT ALL
6. FEELING BAD ABOUT YOURSELF - OR THAT YOU ARE A FAILURE OR HAVE LET YOURSELF OR YOUR FAMILY DOWN: NOT AT ALL
SUM OF ALL RESPONSES TO PHQ9 QUESTIONS 1 AND 2: 0

## 2024-03-22 ENCOUNTER — TELEPHONE (OUTPATIENT)
Dept: FAMILY MEDICINE | Age: 74
End: 2024-03-22

## 2024-03-27 ENCOUNTER — TELEPHONE (OUTPATIENT)
Dept: FAMILY MEDICINE | Age: 74
End: 2024-03-27

## 2024-03-27 ENCOUNTER — EXTERNAL RECORD (OUTPATIENT)
Dept: HEALTH INFORMATION MANAGEMENT | Facility: OTHER | Age: 74
End: 2024-03-27

## 2024-03-27 DIAGNOSIS — E11.9 DIABETES MELLITUS TYPE 2 WITHOUT RETINOPATHY (CMD): ICD-10-CM

## 2024-03-27 RX ORDER — LANCETS 28 GAUGE
EACH MISCELLANEOUS
Qty: 100 EACH | Refills: 1 | Status: SHIPPED | OUTPATIENT
Start: 2024-03-27

## 2024-03-27 RX ORDER — GLUCOSAMINE HCL/CHONDROITIN SU 500-400 MG
CAPSULE ORAL
Qty: 100 EACH | Refills: 1 | Status: SHIPPED | OUTPATIENT
Start: 2024-03-27

## 2024-03-28 ENCOUNTER — NURSE TRIAGE (OUTPATIENT)
Dept: FAMILY MEDICINE | Age: 74
End: 2024-03-28

## 2024-03-28 ENCOUNTER — CASE MANAGEMENT (OUTPATIENT)
Dept: CARE COORDINATION | Age: 74
End: 2024-03-28

## 2024-03-28 DIAGNOSIS — J84.10 PULMONARY FIBROSIS (CMD): ICD-10-CM

## 2024-03-28 DIAGNOSIS — I27.20 PULMONARY HTN (CMD): Primary | ICD-10-CM

## 2024-03-28 DIAGNOSIS — R05.3 CHRONIC COUGH: ICD-10-CM

## 2024-03-28 DIAGNOSIS — J84.9 INTERSTITIAL LUNG DISEASE (CMD): ICD-10-CM

## 2024-03-28 DIAGNOSIS — E11.9 DIABETES MELLITUS TYPE 2 WITHOUT RETINOPATHY (CMD): ICD-10-CM

## 2024-04-01 ENCOUNTER — CASE MANAGEMENT (OUTPATIENT)
Dept: CARE COORDINATION | Age: 74
End: 2024-04-01

## 2024-04-01 ENCOUNTER — TELEPHONE (OUTPATIENT)
Dept: FAMILY MEDICINE | Age: 74
End: 2024-04-01

## 2024-04-01 ASSESSMENT — SLEEP AND FATIGUE QUESTIONNAIRES: SLEEP DESCRIPTORS: DIFFICULTY FALLING ASLEEP

## 2024-04-02 ENCOUNTER — CASE MANAGEMENT (OUTPATIENT)
Dept: CARE COORDINATION | Age: 74
End: 2024-04-02

## 2024-04-03 ENCOUNTER — NURSE TRIAGE (OUTPATIENT)
Dept: FAMILY MEDICINE | Age: 74
End: 2024-04-03

## 2024-04-03 ENCOUNTER — CASE MANAGEMENT (OUTPATIENT)
Dept: CARE COORDINATION | Age: 74
End: 2024-04-03

## 2024-04-04 ENCOUNTER — NURSE TRIAGE (OUTPATIENT)
Dept: FAMILY MEDICINE | Age: 74
End: 2024-04-04

## 2024-04-04 ENCOUNTER — CASE MANAGEMENT (OUTPATIENT)
Dept: CARE COORDINATION | Age: 74
End: 2024-04-04

## 2024-04-05 ENCOUNTER — CASE MANAGEMENT (OUTPATIENT)
Dept: CARE COORDINATION | Age: 74
End: 2024-04-05

## 2024-04-05 ENCOUNTER — TELEPHONE (OUTPATIENT)
Dept: CARE COORDINATION | Age: 74
End: 2024-04-05

## 2024-04-08 ENCOUNTER — TELEPHONE (OUTPATIENT)
Dept: FAMILY MEDICINE | Age: 74
End: 2024-04-08

## 2024-04-10 ENCOUNTER — CASE MANAGEMENT (OUTPATIENT)
Dept: CARE COORDINATION | Age: 74
End: 2024-04-10

## 2024-04-12 ENCOUNTER — CASE MANAGEMENT (OUTPATIENT)
Dept: POST ACUTE CARE | Age: 74
End: 2024-04-12

## 2024-04-12 DIAGNOSIS — R53.81 DEBILITY: Primary | ICD-10-CM

## 2024-04-12 PROCEDURE — 99199 UNLISTED SPECIAL SVC PX/RPRT: CPT | Performed by: NURSE PRACTITIONER

## 2024-04-15 ENCOUNTER — ADMINISTRATIVE DOCUMENTATION (OUTPATIENT)
Dept: POST ACUTE CARE | Age: 74
End: 2024-04-15

## 2024-04-15 RX ORDER — ACETAMINOPHEN 325 MG/1
650 TABLET ORAL EVERY 4 HOURS PRN
COMMUNITY

## 2024-04-15 RX ORDER — INSULIN LISPRO 100 [IU]/ML
INJECTION, SOLUTION INTRAVENOUS; SUBCUTANEOUS
COMMUNITY

## 2024-04-15 RX ORDER — POLYETHYLENE GLYCOL 3350 17 G/17G
17 POWDER, FOR SOLUTION ORAL DAILY
COMMUNITY

## 2024-04-15 RX ORDER — AMOXICILLIN 250 MG
1 CAPSULE ORAL NIGHTLY
COMMUNITY

## 2024-04-15 RX ORDER — OMEPRAZOLE 40 MG/1
40 CAPSULE, DELAYED RELEASE ORAL DAILY
COMMUNITY

## 2024-04-15 RX ORDER — FLUCONAZOLE 100 MG/1
100 TABLET ORAL DAILY
COMMUNITY

## 2024-04-16 ENCOUNTER — NURSE TRIAGE (OUTPATIENT)
Dept: FAMILY MEDICINE | Age: 74
End: 2024-04-16

## 2024-04-16 ENCOUNTER — NURSING HOME VISIT (OUTPATIENT)
Dept: POST ACUTE CARE | Age: 74
End: 2024-04-16

## 2024-04-16 VITALS
WEIGHT: 86.5 LBS | RESPIRATION RATE: 18 BRPM | BODY MASS INDEX: 14.77 KG/M2 | HEART RATE: 99 BPM | OXYGEN SATURATION: 97 % | SYSTOLIC BLOOD PRESSURE: 107 MMHG | HEIGHT: 64 IN | DIASTOLIC BLOOD PRESSURE: 62 MMHG | TEMPERATURE: 97.6 F

## 2024-04-16 DIAGNOSIS — I10 ESSENTIAL HYPERTENSION: ICD-10-CM

## 2024-04-16 DIAGNOSIS — J84.10 INTERSTITIAL PULMONARY FIBROSIS (CMD): ICD-10-CM

## 2024-04-16 DIAGNOSIS — N40.1 BENIGN PROSTATIC HYPERPLASIA WITH URINARY FREQUENCY: ICD-10-CM

## 2024-04-16 DIAGNOSIS — R35.0 BENIGN PROSTATIC HYPERPLASIA WITH URINARY FREQUENCY: ICD-10-CM

## 2024-04-16 DIAGNOSIS — R53.81 DEBILITY: Primary | ICD-10-CM

## 2024-04-16 DIAGNOSIS — J96.11 CHRONIC RESPIRATORY FAILURE WITH HYPOXIA (CMD): ICD-10-CM

## 2024-04-16 DIAGNOSIS — E43 SEVERE PROTEIN-CALORIE MALNUTRITION (CMD): ICD-10-CM

## 2024-04-16 DIAGNOSIS — E78.00 HYPERCHOLESTEREMIA: ICD-10-CM

## 2024-04-16 DIAGNOSIS — J43.9 PULMONARY EMPHYSEMA, UNSPECIFIED EMPHYSEMA TYPE (CMD): ICD-10-CM

## 2024-04-16 DIAGNOSIS — E11.9 DIABETES MELLITUS TYPE 2 WITHOUT RETINOPATHY (CMD): ICD-10-CM

## 2024-04-16 PROCEDURE — 1123F ACP DISCUSS/DSCN MKR DOCD: CPT | Performed by: NURSE PRACTITIONER

## 2024-04-16 PROCEDURE — 1170F FXNL STATUS ASSESSED: CPT | Performed by: NURSE PRACTITIONER

## 2024-04-16 PROCEDURE — 1158F ADVNC CARE PLAN TLK DOCD: CPT | Performed by: NURSE PRACTITIONER

## 2024-04-16 PROCEDURE — 99497 ADVNCD CARE PLAN 30 MIN: CPT | Performed by: NURSE PRACTITIONER

## 2024-04-16 PROCEDURE — 1160F RVW MEDS BY RX/DR IN RCRD: CPT | Performed by: NURSE PRACTITIONER

## 2024-04-16 PROCEDURE — 1126F AMNT PAIN NOTED NONE PRSNT: CPT | Performed by: NURSE PRACTITIONER

## 2024-04-16 PROCEDURE — 99310 SBSQ NF CARE HIGH MDM 45: CPT | Performed by: NURSE PRACTITIONER

## 2024-04-16 ASSESSMENT — PAIN SCALES - GENERAL: PAINLEVEL: 0

## 2024-04-17 ASSESSMENT — ENCOUNTER SYMPTOMS
HEADACHES: 0
CONSTIPATION: 0
ACTIVITY CHANGE: 1
VOMITING: 0
COUGH: 0
NAUSEA: 0
WEAKNESS: 1
DIARRHEA: 0
SHORTNESS OF BREATH: 0
DIZZINESS: 0
EYE PAIN: 0
CHEST TIGHTNESS: 0
ABDOMINAL PAIN: 0

## 2024-04-18 VITALS
OXYGEN SATURATION: 99 % | TEMPERATURE: 97.8 F | HEART RATE: 85 BPM | RESPIRATION RATE: 18 BRPM | SYSTOLIC BLOOD PRESSURE: 118 MMHG | DIASTOLIC BLOOD PRESSURE: 73 MMHG

## 2024-04-18 ASSESSMENT — ENCOUNTER SYMPTOMS
DIZZINESS: 0
COUGH: 0
NAUSEA: 0
ABDOMINAL PAIN: 0
CHEST TIGHTNESS: 0
ACTIVITY CHANGE: 1
HEADACHES: 0
CONSTIPATION: 0
SHORTNESS OF BREATH: 0
EYE PAIN: 0
DIARRHEA: 0
VOMITING: 0
WEAKNESS: 1

## 2024-04-18 ASSESSMENT — PAIN SCALES - GENERAL: PAINLEVEL: 0

## 2024-04-19 ENCOUNTER — NURSING HOME VISIT (OUTPATIENT)
Dept: POST ACUTE CARE | Age: 74
End: 2024-04-19

## 2024-04-19 DIAGNOSIS — J84.10 INTERSTITIAL PULMONARY FIBROSIS (CMD): ICD-10-CM

## 2024-04-19 DIAGNOSIS — E43 SEVERE PROTEIN-CALORIE MALNUTRITION (CMD): ICD-10-CM

## 2024-04-19 DIAGNOSIS — R53.81 DEBILITY: Primary | ICD-10-CM

## 2024-04-19 DIAGNOSIS — E78.00 HYPERCHOLESTEREMIA: ICD-10-CM

## 2024-04-19 DIAGNOSIS — J96.11 CHRONIC RESPIRATORY FAILURE WITH HYPOXIA (CMD): ICD-10-CM

## 2024-04-19 DIAGNOSIS — E11.9 DIABETES MELLITUS TYPE 2 WITHOUT RETINOPATHY (CMD): ICD-10-CM

## 2024-04-19 DIAGNOSIS — I10 ESSENTIAL HYPERTENSION: ICD-10-CM

## 2024-04-19 DIAGNOSIS — J43.9 PULMONARY EMPHYSEMA, UNSPECIFIED EMPHYSEMA TYPE (CMD): ICD-10-CM

## 2024-04-19 PROCEDURE — 99309 SBSQ NF CARE MODERATE MDM 30: CPT | Performed by: NURSE PRACTITIONER

## 2024-04-19 PROCEDURE — 1170F FXNL STATUS ASSESSED: CPT | Performed by: NURSE PRACTITIONER

## 2024-04-19 PROCEDURE — 1126F AMNT PAIN NOTED NONE PRSNT: CPT | Performed by: NURSE PRACTITIONER

## 2024-04-23 ENCOUNTER — NURSING HOME VISIT (OUTPATIENT)
Dept: POST ACUTE CARE | Age: 74
End: 2024-04-23

## 2024-04-23 VITALS
TEMPERATURE: 97.9 F | BODY MASS INDEX: 14.85 KG/M2 | WEIGHT: 86.5 LBS | SYSTOLIC BLOOD PRESSURE: 124 MMHG | RESPIRATION RATE: 18 BRPM | HEART RATE: 87 BPM | OXYGEN SATURATION: 97 % | DIASTOLIC BLOOD PRESSURE: 63 MMHG

## 2024-04-23 DIAGNOSIS — I10 ESSENTIAL HYPERTENSION: ICD-10-CM

## 2024-04-23 DIAGNOSIS — E11.9 DIABETES MELLITUS TYPE 2 WITHOUT RETINOPATHY (CMD): ICD-10-CM

## 2024-04-23 DIAGNOSIS — Z71.89 ACP (ADVANCE CARE PLANNING): ICD-10-CM

## 2024-04-23 DIAGNOSIS — R35.0 BENIGN PROSTATIC HYPERPLASIA WITH URINARY FREQUENCY: ICD-10-CM

## 2024-04-23 DIAGNOSIS — J43.9 PULMONARY EMPHYSEMA, UNSPECIFIED EMPHYSEMA TYPE (CMD): ICD-10-CM

## 2024-04-23 DIAGNOSIS — J96.11 CHRONIC RESPIRATORY FAILURE WITH HYPOXIA (CMD): Primary | ICD-10-CM

## 2024-04-23 DIAGNOSIS — J84.10 INTERSTITIAL PULMONARY FIBROSIS (CMD): ICD-10-CM

## 2024-04-23 DIAGNOSIS — E43 SEVERE PROTEIN-CALORIE MALNUTRITION (CMD): ICD-10-CM

## 2024-04-23 DIAGNOSIS — R53.81 DEBILITY: ICD-10-CM

## 2024-04-23 DIAGNOSIS — N40.1 BENIGN PROSTATIC HYPERPLASIA WITH URINARY FREQUENCY: ICD-10-CM

## 2024-04-23 PROCEDURE — 1170F FXNL STATUS ASSESSED: CPT | Performed by: CLINICAL NURSE SPECIALIST

## 2024-04-23 PROCEDURE — 1158F ADVNC CARE PLAN TLK DOCD: CPT | Performed by: CLINICAL NURSE SPECIALIST

## 2024-04-23 PROCEDURE — 1157F ADVNC CARE PLAN IN RCRD: CPT | Performed by: CLINICAL NURSE SPECIALIST

## 2024-04-23 PROCEDURE — 99497 ADVNCD CARE PLAN 30 MIN: CPT | Performed by: CLINICAL NURSE SPECIALIST

## 2024-04-23 PROCEDURE — 1160F RVW MEDS BY RX/DR IN RCRD: CPT | Performed by: CLINICAL NURSE SPECIALIST

## 2024-04-23 PROCEDURE — 1126F AMNT PAIN NOTED NONE PRSNT: CPT | Performed by: CLINICAL NURSE SPECIALIST

## 2024-04-23 PROCEDURE — 99309 SBSQ NF CARE MODERATE MDM 30: CPT | Performed by: CLINICAL NURSE SPECIALIST

## 2024-04-23 RX ORDER — ESCITALOPRAM OXALATE 5 MG/1
5 TABLET ORAL DAILY
COMMUNITY

## 2024-04-23 RX ORDER — OMEGA-3 FATTY ACIDS/FISH OIL 300-1000MG
1000 CAPSULE ORAL DAILY
COMMUNITY

## 2024-04-23 ASSESSMENT — PAIN SCALES - GENERAL: PAINLEVEL: 0

## 2024-04-23 ASSESSMENT — ENCOUNTER SYMPTOMS
WEAKNESS: 1
COUGH: 0
EYE PAIN: 0
CONSTIPATION: 0
ACTIVITY CHANGE: 1
HEADACHES: 0
NAUSEA: 0
CHEST TIGHTNESS: 0
SHORTNESS OF BREATH: 0
ABDOMINAL PAIN: 0
DIARRHEA: 0
DIZZINESS: 0
VOMITING: 0

## 2024-04-25 ENCOUNTER — EXTERNAL RECORD (OUTPATIENT)
Dept: HEALTH INFORMATION MANAGEMENT | Facility: OTHER | Age: 74
End: 2024-04-25

## 2024-04-25 VITALS
HEIGHT: 64 IN | HEART RATE: 77 BPM | RESPIRATION RATE: 18 BRPM | DIASTOLIC BLOOD PRESSURE: 60 MMHG | WEIGHT: 89.3 LBS | BODY MASS INDEX: 15.25 KG/M2 | OXYGEN SATURATION: 98 % | TEMPERATURE: 97.4 F | SYSTOLIC BLOOD PRESSURE: 90 MMHG

## 2024-04-25 ASSESSMENT — ENCOUNTER SYMPTOMS
EYE PAIN: 0
DIARRHEA: 0
WEAKNESS: 1
HEADACHES: 0
ACTIVITY CHANGE: 1
VOMITING: 0
COUGH: 0
NAUSEA: 0
CHEST TIGHTNESS: 0
SHORTNESS OF BREATH: 0
ABDOMINAL PAIN: 0
CONSTIPATION: 0
DIZZINESS: 0

## 2024-04-25 ASSESSMENT — PAIN SCALES - GENERAL: PAINLEVEL: 0

## 2024-04-26 ENCOUNTER — NURSING HOME VISIT (OUTPATIENT)
Dept: POST ACUTE CARE | Age: 74
End: 2024-04-26

## 2024-04-26 DIAGNOSIS — N40.1 BENIGN PROSTATIC HYPERPLASIA WITH URINARY FREQUENCY: ICD-10-CM

## 2024-04-26 DIAGNOSIS — R53.81 DEBILITY: ICD-10-CM

## 2024-04-26 DIAGNOSIS — J43.9 PULMONARY EMPHYSEMA, UNSPECIFIED EMPHYSEMA TYPE (CMD): ICD-10-CM

## 2024-04-26 DIAGNOSIS — E78.00 HYPERCHOLESTEREMIA: ICD-10-CM

## 2024-04-26 DIAGNOSIS — J96.11 CHRONIC RESPIRATORY FAILURE WITH HYPOXIA (CMD): Primary | ICD-10-CM

## 2024-04-26 DIAGNOSIS — E43 SEVERE PROTEIN-CALORIE MALNUTRITION (CMD): ICD-10-CM

## 2024-04-26 DIAGNOSIS — R35.0 BENIGN PROSTATIC HYPERPLASIA WITH URINARY FREQUENCY: ICD-10-CM

## 2024-04-26 DIAGNOSIS — J84.10 INTERSTITIAL PULMONARY FIBROSIS (CMD): ICD-10-CM

## 2024-04-26 DIAGNOSIS — E11.9 DIABETES MELLITUS TYPE 2 WITHOUT RETINOPATHY (CMD): ICD-10-CM

## 2024-04-26 DIAGNOSIS — I10 ESSENTIAL HYPERTENSION: ICD-10-CM

## 2024-04-26 PROCEDURE — 99309 SBSQ NF CARE MODERATE MDM 30: CPT | Performed by: NURSE PRACTITIONER

## 2024-04-26 PROCEDURE — 1170F FXNL STATUS ASSESSED: CPT | Performed by: NURSE PRACTITIONER

## 2024-04-26 PROCEDURE — 1126F AMNT PAIN NOTED NONE PRSNT: CPT | Performed by: NURSE PRACTITIONER

## 2024-04-30 ENCOUNTER — NURSING HOME VISIT (OUTPATIENT)
Dept: POST ACUTE CARE | Age: 74
End: 2024-04-30

## 2024-04-30 VITALS
HEART RATE: 76 BPM | RESPIRATION RATE: 18 BRPM | SYSTOLIC BLOOD PRESSURE: 111 MMHG | WEIGHT: 91.6 LBS | OXYGEN SATURATION: 96 % | DIASTOLIC BLOOD PRESSURE: 67 MMHG | BODY MASS INDEX: 15.72 KG/M2 | TEMPERATURE: 98 F

## 2024-04-30 DIAGNOSIS — I10 ESSENTIAL HYPERTENSION: ICD-10-CM

## 2024-04-30 DIAGNOSIS — J43.9 PULMONARY EMPHYSEMA, UNSPECIFIED EMPHYSEMA TYPE  (CMD): ICD-10-CM

## 2024-04-30 DIAGNOSIS — J84.10 INTERSTITIAL PULMONARY FIBROSIS  (CMD): ICD-10-CM

## 2024-04-30 DIAGNOSIS — J96.11 CHRONIC RESPIRATORY FAILURE WITH HYPOXIA  (CMD): Primary | ICD-10-CM

## 2024-04-30 DIAGNOSIS — E11.9 DIABETES MELLITUS TYPE 2 WITHOUT RETINOPATHY  (CMD): ICD-10-CM

## 2024-04-30 DIAGNOSIS — E43 SEVERE PROTEIN-CALORIE MALNUTRITION  (CMD): ICD-10-CM

## 2024-04-30 PROCEDURE — 1126F AMNT PAIN NOTED NONE PRSNT: CPT | Performed by: CLINICAL NURSE SPECIALIST

## 2024-04-30 PROCEDURE — 1123F ACP DISCUSS/DSCN MKR DOCD: CPT | Performed by: CLINICAL NURSE SPECIALIST

## 2024-04-30 PROCEDURE — 1160F RVW MEDS BY RX/DR IN RCRD: CPT | Performed by: CLINICAL NURSE SPECIALIST

## 2024-04-30 PROCEDURE — 1157F ADVNC CARE PLAN IN RCRD: CPT | Performed by: CLINICAL NURSE SPECIALIST

## 2024-04-30 PROCEDURE — 1170F FXNL STATUS ASSESSED: CPT | Performed by: CLINICAL NURSE SPECIALIST

## 2024-04-30 PROCEDURE — 99309 SBSQ NF CARE MODERATE MDM 30: CPT | Performed by: CLINICAL NURSE SPECIALIST

## 2024-04-30 ASSESSMENT — ENCOUNTER SYMPTOMS
SHORTNESS OF BREATH: 0
CHEST TIGHTNESS: 0
DIZZINESS: 0
ABDOMINAL PAIN: 0
DIARRHEA: 0
ACTIVITY CHANGE: 1
WEAKNESS: 1
NAUSEA: 0
COUGH: 0
EYE PAIN: 0
HEADACHES: 0
VOMITING: 0
CONSTIPATION: 0

## 2024-04-30 ASSESSMENT — PAIN SCALES - GENERAL: PAINLEVEL: 0

## 2024-05-02 ENCOUNTER — TELEPHONE (OUTPATIENT)
Dept: PULMONOLOGY | Age: 74
End: 2024-05-02

## 2024-05-03 ENCOUNTER — NURSING HOME VISIT (OUTPATIENT)
Dept: POST ACUTE CARE | Age: 74
End: 2024-05-03

## 2024-05-03 VITALS
HEART RATE: 82 BPM | DIASTOLIC BLOOD PRESSURE: 74 MMHG | BODY MASS INDEX: 15.71 KG/M2 | TEMPERATURE: 97.8 F | HEIGHT: 64 IN | SYSTOLIC BLOOD PRESSURE: 120 MMHG | RESPIRATION RATE: 20 BRPM | OXYGEN SATURATION: 95 % | WEIGHT: 92 LBS

## 2024-05-03 DIAGNOSIS — R35.0 BENIGN PROSTATIC HYPERPLASIA WITH URINARY FREQUENCY: ICD-10-CM

## 2024-05-03 DIAGNOSIS — J84.10 INTERSTITIAL PULMONARY FIBROSIS  (CMD): ICD-10-CM

## 2024-05-03 DIAGNOSIS — J96.11 CHRONIC RESPIRATORY FAILURE WITH HYPOXIA  (CMD): Primary | ICD-10-CM

## 2024-05-03 DIAGNOSIS — I10 ESSENTIAL HYPERTENSION: ICD-10-CM

## 2024-05-03 DIAGNOSIS — N40.1 BENIGN PROSTATIC HYPERPLASIA WITH URINARY FREQUENCY: ICD-10-CM

## 2024-05-03 DIAGNOSIS — E78.00 HYPERCHOLESTEREMIA: ICD-10-CM

## 2024-05-03 DIAGNOSIS — E11.9 DIABETES MELLITUS TYPE 2 WITHOUT RETINOPATHY  (CMD): ICD-10-CM

## 2024-05-03 DIAGNOSIS — J43.9 PULMONARY EMPHYSEMA, UNSPECIFIED EMPHYSEMA TYPE  (CMD): ICD-10-CM

## 2024-05-03 DIAGNOSIS — R53.81 DEBILITY: ICD-10-CM

## 2024-05-03 DIAGNOSIS — E43 SEVERE PROTEIN-CALORIE MALNUTRITION  (CMD): ICD-10-CM

## 2024-05-03 PROCEDURE — 99309 SBSQ NF CARE MODERATE MDM 30: CPT | Performed by: NURSE PRACTITIONER

## 2024-05-03 PROCEDURE — 1126F AMNT PAIN NOTED NONE PRSNT: CPT | Performed by: NURSE PRACTITIONER

## 2024-05-03 RX ORDER — FUROSEMIDE 20 MG/1
20 TABLET ORAL DAILY
COMMUNITY
Start: 2024-05-02 | End: 2024-05-04

## 2024-05-03 ASSESSMENT — PAIN SCALES - GENERAL: PAINLEVEL: 0

## 2024-05-03 ASSESSMENT — ENCOUNTER SYMPTOMS
EYE PAIN: 0
HEADACHES: 0
NAUSEA: 0
VOMITING: 0
ACTIVITY CHANGE: 1
CHEST TIGHTNESS: 0
COUGH: 0
SHORTNESS OF BREATH: 0
CONSTIPATION: 0
WEAKNESS: 1
DIARRHEA: 0
ABDOMINAL PAIN: 0
DIZZINESS: 0

## 2024-05-07 ENCOUNTER — NURSING HOME VISIT (OUTPATIENT)
Dept: POST ACUTE CARE | Age: 74
End: 2024-05-07

## 2024-05-07 VITALS
DIASTOLIC BLOOD PRESSURE: 78 MMHG | OXYGEN SATURATION: 97 % | BODY MASS INDEX: 15.79 KG/M2 | SYSTOLIC BLOOD PRESSURE: 125 MMHG | TEMPERATURE: 98 F | RESPIRATION RATE: 20 BRPM | HEART RATE: 90 BPM | WEIGHT: 92 LBS

## 2024-05-07 DIAGNOSIS — J96.11 CHRONIC RESPIRATORY FAILURE WITH HYPOXIA  (CMD): Primary | ICD-10-CM

## 2024-05-07 DIAGNOSIS — N40.1 BENIGN PROSTATIC HYPERPLASIA WITH URINARY FREQUENCY: ICD-10-CM

## 2024-05-07 DIAGNOSIS — E43 SEVERE PROTEIN-CALORIE MALNUTRITION  (CMD): ICD-10-CM

## 2024-05-07 DIAGNOSIS — J43.9 PULMONARY EMPHYSEMA, UNSPECIFIED EMPHYSEMA TYPE  (CMD): ICD-10-CM

## 2024-05-07 DIAGNOSIS — R35.0 BENIGN PROSTATIC HYPERPLASIA WITH URINARY FREQUENCY: ICD-10-CM

## 2024-05-07 DIAGNOSIS — I10 ESSENTIAL HYPERTENSION: ICD-10-CM

## 2024-05-07 DIAGNOSIS — E11.9 DIABETES MELLITUS TYPE 2 WITHOUT RETINOPATHY  (CMD): ICD-10-CM

## 2024-05-07 DIAGNOSIS — J84.10 INTERSTITIAL PULMONARY FIBROSIS  (CMD): ICD-10-CM

## 2024-05-07 PROCEDURE — 1126F AMNT PAIN NOTED NONE PRSNT: CPT | Performed by: CLINICAL NURSE SPECIALIST

## 2024-05-07 PROCEDURE — 1160F RVW MEDS BY RX/DR IN RCRD: CPT | Performed by: CLINICAL NURSE SPECIALIST

## 2024-05-07 PROCEDURE — 99309 SBSQ NF CARE MODERATE MDM 30: CPT | Performed by: CLINICAL NURSE SPECIALIST

## 2024-05-07 PROCEDURE — 1157F ADVNC CARE PLAN IN RCRD: CPT | Performed by: CLINICAL NURSE SPECIALIST

## 2024-05-07 RX ORDER — FUROSEMIDE 20 MG/1
20 TABLET ORAL DAILY
COMMUNITY

## 2024-05-07 ASSESSMENT — ENCOUNTER SYMPTOMS
ACTIVITY CHANGE: 1
VOMITING: 0
NAUSEA: 0
SHORTNESS OF BREATH: 0
ABDOMINAL PAIN: 0
HEADACHES: 0
CONSTIPATION: 0
DIARRHEA: 0
DIZZINESS: 0
COUGH: 0
EYE PAIN: 0
WEAKNESS: 1
CHEST TIGHTNESS: 0

## 2024-05-07 ASSESSMENT — PAIN SCALES - GENERAL: PAINLEVEL: 0

## 2024-05-15 ENCOUNTER — TELEPHONE (OUTPATIENT)
Dept: PULMONOLOGY | Age: 74
End: 2024-05-15

## 2024-05-15 ENCOUNTER — APPOINTMENT (OUTPATIENT)
Dept: PULMONOLOGY | Age: 74
End: 2024-05-15

## 2024-05-16 ENCOUNTER — TELEPHONE (OUTPATIENT)
Dept: PULMONOLOGY | Age: 74
End: 2024-05-16

## 2024-06-07 ENCOUNTER — APPOINTMENT (OUTPATIENT)
Dept: FAMILY MEDICINE | Age: 74
End: 2024-06-07

## 2024-06-07 ENCOUNTER — CLINICAL ABSTRACT (OUTPATIENT)
Dept: HEALTH INFORMATION MANAGEMENT | Facility: OTHER | Age: 74
End: 2024-06-07

## 2024-06-28 ENCOUNTER — TRANSCRIBE ORDERS (OUTPATIENT)
Dept: PULMONOLOGY | Age: 74
End: 2024-06-28

## 2024-06-28 DIAGNOSIS — J84.9 ILD (INTERSTITIAL LUNG DISEASE)  (CMD): Primary | ICD-10-CM

## 2025-08-07 ENCOUNTER — TELEPHONE (OUTPATIENT)
Dept: FAMILY MEDICINE | Age: 75
End: 2025-08-07

## (undated) DIAGNOSIS — R63.4 ABNORMAL LOSS OF WEIGHT: Primary | ICD-10-CM

## (undated) DIAGNOSIS — D50.9 IRON DEFICIENCY ANEMIA, UNSPECIFIED IRON DEFICIENCY ANEMIA TYPE: ICD-10-CM

## (undated) DIAGNOSIS — R63.0 LOSS OF APPETITE: ICD-10-CM

## (undated) DIAGNOSIS — R63.4 WEIGHT LOSS: ICD-10-CM

## (undated) DIAGNOSIS — R63.4 LOSS OF WEIGHT: Primary | ICD-10-CM

## (undated) DIAGNOSIS — R63.0 DECREASED APPETITE: Primary | ICD-10-CM

## (undated) DIAGNOSIS — I51.7 CARDIOMEGALY: Primary | ICD-10-CM

## (undated) DIAGNOSIS — I10 ESSENTIAL HYPERTENSION: ICD-10-CM

## (undated) DEVICE — Device: Brand: DEFENDO AIR/WATER/SUCTION AND BIOPSY VALVE

## (undated) DEVICE — Device: Brand: CUSTOM PROCEDURE KIT

## (undated) DEVICE — MEDI-VAC NON-CONDUCTIVE SUCTION TUBING 6MM X 1.8M (6FT.) L: Brand: CARDINAL HEALTH

## (undated) DEVICE — LINE MNTR ADLT SET O2 INTMD

## (undated) DEVICE — FORCEP RADIAL JAW 4

## (undated) NOTE — Clinical Note
Cirilo Padilla - I saw Mikey Minors today, a very nice gentleman with GERD/chest discomfort. It most likely is GERD, but I ordered a stress test to make sure cardiac etiology is not missed. If stress is negative, will proceed with EGD.  -[employer]

## (undated) NOTE — LETTER
[address] [id number] [phone]     02/19/24    This is the [facility], office of Dr. Randy Larsen.        Thank you for putting your trust in [facility].  Our goal is to deliver the highest quality healthcare and an exceptional patient experience. Review of your medical record shows you are due for the following:       Annual Medicare Physical       Please update your PCP with your insurance carrier.    Please call [phone] to schedule your appointment or schedule online via [employer].     If you changed to a new provider at another facility, please notify the clinic to update your records.    If you had any recent testing at another facility, please have your results faxed to our office at [phone].     Thank you and have a great day!

## (undated) NOTE — LETTER
Valentina Burk, [address]  [place], [place]       04/19/19        Patient: Magali Abbasi   YOB: 1950   Date of Visit: 4/19/2019       Dear  Dr. Lita Hamlin MD,      Thank you for referring Magali Abbasi to my practice.   Please find m

## (undated) NOTE — LETTER
Angelia Oliver Md  Doctor Pascual   [place],  [place]       05/18/21        Patient: Xavier Dance   YOB: 1950   Date of Visit: 5/18/2021       Dear  Dr. Leatha Bucio MD,      Thank you for referring Xavier Dance to my practice.   Please f

## (undated) NOTE — LETTER
April 12, 2018         Neftaly Avinaian, [facility]      Patient: Jen Grimaldo   YOB: 1950   Date of Visit: 4/12/2018       Dear Dr. Saqib Rosales MD,    I saw your patient, Jen Grimaldo, on 4/12/2018.  Enclosed is my co

## (undated) NOTE — ED AVS SNAPSHOT
Clearance Spurling   MRN: [id number]    Department:  [facility] Emergency Department   Date of Visit:  3/29/2019           Disclosure     Insurance plans vary and the physician(s) referred by the ER may not be covered by your plan.  Please contact your within the next three months to obtain basic health screening including reassessment of your blood pressure.     IF THERE IS ANY CHANGE OR WORSENING OF YOUR CONDITION, CALL YOUR PRIMARY CARE PHYSICIAN AT ONCE OR RETURN IMMEDIATELY TO THE EMERGENCY DEPARTMEN

## (undated) NOTE — LETTER
12/04/21        [address]      Dear Jeremiah Pickett,    [facility] records indicate that you have outstanding lab work and or testing that was ordered for you and has not yet been completed:  Orders Placed This Encounter

## (undated) NOTE — Clinical Note
Hello there  This patient concerns me, iron def anemia, lack of appetite, unintentional weight loss, told him to follow up with GI but still hasnt, could we please get him in soon. Last cln 2016  Thank you!

## (undated) NOTE — LETTER
11/6/2018              Juan Antonio Vega        [address] [place] [address]*         Dear Domenica Lyons ,    A refill for Omeprazole 40mg  was authorized and given to your pharmacy on 11/5/18.  No additional refills will be able to be approved

## (undated) NOTE — LETTER
8/15/2023              Juan Antonio Vega        [address] [place] [address] [id number] [place] [address],     This is the [facility], office of Dr. Muriel Faulkner. Thank you for putting your trust in [place] [address]. Our goal is to deliver the highest quality healthcare and an exceptional patient experience. Review of your medical record shows you are due for the following:       Annual Medicare Physical       Please call  to schedule your appointment or schedule online via [facility]. If you changed to a new provider at another facility, please notify the clinic to update your records. If you had any recent testing at another facility, please have your results faxed to our office at [phone]. Thank you and have a great day!

## (undated) NOTE — LETTER
4/15/2019          To Whom It May Concern:    Arina Raza is currently under my medical care and may not return to work at this time. Please excuse Jane Hoffman for 3 days. He may return to work on April 18, 2019 . Activity is restricted as follows: none.

## (undated) NOTE — Clinical Note
Please see my note  Also informed patient to contact GI department to get his procedure for upper and lower endoscopy done sooner.   I will send this message to gastro as well  He was confused and agrees with earlier scheduling of EGD/CLN for unexplained weight loss and iron def anemia